# Patient Record
Sex: FEMALE | Race: BLACK OR AFRICAN AMERICAN | NOT HISPANIC OR LATINO
[De-identification: names, ages, dates, MRNs, and addresses within clinical notes are randomized per-mention and may not be internally consistent; named-entity substitution may affect disease eponyms.]

---

## 2017-01-10 ENCOUNTER — OTHER (OUTPATIENT)
Age: 22
End: 2017-01-10

## 2017-01-11 ENCOUNTER — OTHER (OUTPATIENT)
Age: 22
End: 2017-01-11

## 2017-01-23 ENCOUNTER — OTHER (OUTPATIENT)
Age: 22
End: 2017-01-23

## 2017-01-25 ENCOUNTER — OTHER (OUTPATIENT)
Age: 22
End: 2017-01-25

## 2017-01-26 ENCOUNTER — OTHER (OUTPATIENT)
Age: 22
End: 2017-01-26

## 2017-02-06 ENCOUNTER — OTHER (OUTPATIENT)
Age: 22
End: 2017-02-06

## 2017-02-10 ENCOUNTER — OTHER (OUTPATIENT)
Age: 22
End: 2017-02-10

## 2017-02-14 ENCOUNTER — OTHER (OUTPATIENT)
Age: 22
End: 2017-02-14

## 2017-02-21 ENCOUNTER — OTHER (OUTPATIENT)
Age: 22
End: 2017-02-21

## 2017-02-27 ENCOUNTER — OTHER (OUTPATIENT)
Age: 22
End: 2017-02-27

## 2017-03-08 ENCOUNTER — OTHER (OUTPATIENT)
Age: 22
End: 2017-03-08

## 2017-03-28 ENCOUNTER — OTHER (OUTPATIENT)
Age: 22
End: 2017-03-28

## 2017-04-03 ENCOUNTER — OTHER (OUTPATIENT)
Age: 22
End: 2017-04-03

## 2017-04-11 ENCOUNTER — OTHER (OUTPATIENT)
Age: 22
End: 2017-04-11

## 2017-04-27 ENCOUNTER — OTHER (OUTPATIENT)
Age: 22
End: 2017-04-27

## 2017-05-09 ENCOUNTER — OTHER (OUTPATIENT)
Age: 22
End: 2017-05-09

## 2017-05-25 ENCOUNTER — RX RENEWAL (OUTPATIENT)
Age: 22
End: 2017-05-25

## 2017-05-25 ENCOUNTER — OTHER (OUTPATIENT)
Age: 22
End: 2017-05-25

## 2017-05-30 ENCOUNTER — OTHER (OUTPATIENT)
Age: 22
End: 2017-05-30

## 2017-06-01 ENCOUNTER — RX RENEWAL (OUTPATIENT)
Age: 22
End: 2017-06-01

## 2017-06-05 ENCOUNTER — OTHER (OUTPATIENT)
Age: 22
End: 2017-06-05

## 2017-06-07 ENCOUNTER — RECORD ABSTRACTING (OUTPATIENT)
Age: 22
End: 2017-06-07

## 2017-06-12 ENCOUNTER — RX RENEWAL (OUTPATIENT)
Age: 22
End: 2017-06-12

## 2017-06-14 ENCOUNTER — OTHER (OUTPATIENT)
Age: 22
End: 2017-06-14

## 2017-06-14 ENCOUNTER — APPOINTMENT (OUTPATIENT)
Dept: PEDIATRIC ALLERGY IMMUNOLOGY | Facility: CLINIC | Age: 22
End: 2017-06-14

## 2017-06-14 VITALS
OXYGEN SATURATION: 98 % | SYSTOLIC BLOOD PRESSURE: 131 MMHG | HEART RATE: 67 BPM | DIASTOLIC BLOOD PRESSURE: 76 MMHG | HEIGHT: 64.29 IN | WEIGHT: 162.99 LBS | BODY MASS INDEX: 27.83 KG/M2

## 2017-06-14 DIAGNOSIS — L29.9 PRURITUS, UNSPECIFIED: ICD-10-CM

## 2017-06-15 LAB
25(OH)D3 SERPL-MCNC: 13.5 NG/ML
ALBUMIN SERPL ELPH-MCNC: 4.3 G/DL
ALP BLD-CCNC: 59 U/L
ALT SERPL-CCNC: 13 U/L
AMYLASE/CREAT SERPL: 207 U/L
ANION GAP SERPL CALC-SCNC: 13 MMOL/L
AST SERPL-CCNC: 20 U/L
BASOPHILS # BLD AUTO: 0 K/UL
BASOPHILS NFR BLD AUTO: 0 %
BILIRUB SERPL-MCNC: 0.4 MG/DL
BUN SERPL-MCNC: 10 MG/DL
CALCIUM SERPL-MCNC: 9.3 MG/DL
CD3 CELLS # BLD: 627 /UL
CD3 CELLS NFR BLD: 63 %
CD3+CD4+ CELLS # BLD: 9 /UL
CD3+CD4+ CELLS NFR BLD: 1 %
CD3+CD4+ CELLS/CD3+CD8+ CLL SPEC: 0.02 RATIO
CD3+CD8+ CELLS # SPEC: 567 /UL
CD3+CD8+ CELLS NFR BLD: 57 %
CHLORIDE SERPL-SCNC: 102 MMOL/L
CHOLEST SERPL-MCNC: 152 MG/DL
CHOLEST/HDLC SERPL: 2.3 RATIO
CO2 SERPL-SCNC: 24 MMOL/L
CREAT SERPL-MCNC: 0.66 MG/DL
EOSINOPHIL # BLD AUTO: 0.08 K/UL
EOSINOPHIL NFR BLD AUTO: 2.3 %
GLUCOSE SERPL-MCNC: 81 MG/DL
HAV IGG+IGM SER QL: REACTIVE
HBA1C MFR BLD HPLC: 5.4 %
HBV CORE IGG+IGM SER QL: NONREACTIVE
HBV SURFACE AB SERPL IA-ACNC: 82.6 MIU/ML
HBV SURFACE AG SER QL: NONREACTIVE
HCT VFR BLD CALC: 39.7 %
HCV AB SER QL: NONREACTIVE
HCV S/CO RATIO: 0.14 S/CO
HDLC SERPL-MCNC: 67 MG/DL
HGB BLD-MCNC: 12.8 G/DL
HIV1 RNA # SERPL NAA+PROBE: ABNORMAL
HIV1 RNA # SERPL NAA+PROBE: NORMAL
IMM GRANULOCYTES NFR BLD AUTO: 0 %
LDLC SERPL CALC-MCNC: 73 MG/DL
LPL SERPL-CCNC: 40 U/L
LYMPHOCYTES # BLD AUTO: 1.1 K/UL
LYMPHOCYTES NFR BLD AUTO: 31.3 %
MAN DIFF?: NORMAL
MCHC RBC-ENTMCNC: 25.7 PG
MCHC RBC-ENTMCNC: 32.2 GM/DL
MCV RBC AUTO: 79.7 FL
MEV IGG FLD QL IA: <5 AU/ML
MEV IGG+IGM SER-IMP: NEGATIVE
MONOCYTES # BLD AUTO: 0.27 K/UL
MONOCYTES NFR BLD AUTO: 7.7 %
MUV AB SER-ACNC: POSITIVE
MUV IGG SER QL IA: 86.7 AU/ML
NEUTROPHILS # BLD AUTO: 2.06 K/UL
NEUTROPHILS NFR BLD AUTO: 58.7 %
PLATELET # BLD AUTO: 244 K/UL
POTASSIUM SERPL-SCNC: 3.9 MMOL/L
PROT SERPL-MCNC: 7.7 G/DL
RBC # BLD: 4.98 M/UL
RBC # FLD: 15.9 %
RUBV IGG FLD-ACNC: 1.5 INDEX
RUBV IGG SER-IMP: POSITIVE
SODIUM SERPL-SCNC: 139 MMOL/L
T PALLIDUM AB SER QL IA: NEGATIVE
TRIGL SERPL-MCNC: 62 MG/DL
TSH SERPL-ACNC: 1.13 UIU/ML
VIRAL LOAD INTERP: NORMAL
VIRAL LOAD LOG: 4.76
VZV AB TITR SER: POSITIVE
VZV IGG SER IF-ACNC: 1075 INDEX
WBC # FLD AUTO: 3.51 K/UL

## 2017-06-19 LAB
ADJUSTED MITOGEN: >10 IU/ML
ADJUSTED TB AG: 0.03 IU/ML
M TB IFN-G BLD-IMP: NEGATIVE
QUANTIFERON GOLD NIL: 0.03 IU/ML

## 2017-06-20 LAB
ABACAVIR ISLT GENOTYP: NORMAL
ATAZANAVIR+RITONAVIR ISLT GENOTYP: NORMAL
DARUNAVIR+RITONAVIR ISLT GENOTYP: NORMAL
DIDANOSINE ISLT GENOTYP: NORMAL
EFAVIRENZ ISLT GENOTYP: NORMAL
EMTRICITABINE ISLT GENOTYP: NORMAL
ETRAVIRINE ISLT GENOTYP: NORMAL
FOSAMPRENAVIR+RITONAVIR ISLT GENOTYP: NORMAL
HIV NNRTI GENE MUT DET ISLT: NORMAL
HIV NRTI GENE MUT DET ISLT: NORMAL
HIV PI GENE MUT DET ISLT: NORMAL
HIV RT GENE MUT DET ISLT: NORMAL
INDINAVIR+RITONAVIR ISLT GENOTYP: NORMAL
LAMIVUDINE ISLT GENOTYP: NORMAL
LOPINAVIR+RITONAVIR ISLT GENOTYP: NORMAL
NELFINAVIR ISLT GENOTYP: NORMAL
NEVIRAPINE ISLT GENOTYP: NORMAL
RILPIVIRINE ISLT GENOTYP: NORMAL
SAQUINAVIR+RITONAVIR ISLT GENOTYP: NORMAL
STAVUDINE ISLT GENOTYP: NORMAL
TENOFOVIR ISLT GENOTYP: NORMAL
TIPRANAVIR+RITONAVIR ISLT GENOTYP: NORMAL
ZIDOVUDINE ISLT GENOTYP: NORMAL

## 2017-06-21 ENCOUNTER — OTHER (OUTPATIENT)
Age: 22
End: 2017-06-21

## 2017-06-21 LAB
DOLUTEGRAVIR RESISTANCE: NORMAL
ELVITEGRAVIR RESISTANCE: NORMAL
RALTEGRAVIR RESISTANCE: NORMAL

## 2017-06-22 ENCOUNTER — OTHER (OUTPATIENT)
Age: 22
End: 2017-06-22

## 2017-07-11 ENCOUNTER — OTHER (OUTPATIENT)
Age: 22
End: 2017-07-11

## 2017-07-12 ENCOUNTER — OTHER (OUTPATIENT)
Age: 22
End: 2017-07-12

## 2017-07-12 ENCOUNTER — APPOINTMENT (OUTPATIENT)
Dept: PEDIATRIC ALLERGY IMMUNOLOGY | Facility: CLINIC | Age: 22
End: 2017-07-12

## 2017-07-18 ENCOUNTER — OTHER (OUTPATIENT)
Age: 22
End: 2017-07-18

## 2017-07-18 ENCOUNTER — APPOINTMENT (OUTPATIENT)
Dept: DERMATOLOGY | Facility: CLINIC | Age: 22
End: 2017-07-18

## 2017-07-18 ENCOUNTER — RECORD ABSTRACTING (OUTPATIENT)
Age: 22
End: 2017-07-18

## 2017-07-18 VITALS — HEIGHT: 64.29 IN

## 2017-07-18 DIAGNOSIS — L30.9 DERMATITIS, UNSPECIFIED: ICD-10-CM

## 2017-07-18 DIAGNOSIS — L81.0 POSTINFLAMMATORY HYPERPIGMENTATION: ICD-10-CM

## 2017-07-18 DIAGNOSIS — L70.9 ACNE, UNSPECIFIED: ICD-10-CM

## 2017-07-18 DIAGNOSIS — Z56.0 UNEMPLOYMENT, UNSPECIFIED: ICD-10-CM

## 2017-07-18 SDOH — ECONOMIC STABILITY - INCOME SECURITY: UNEMPLOYMENT, UNSPECIFIED: Z56.0

## 2017-07-19 LAB
APPEARANCE: CLEAR
BILIRUBIN URINE: NEGATIVE
BLOOD URINE: NEGATIVE
C TRACH RRNA SPEC QL NAA+PROBE: NORMAL
COLOR: YELLOW
GLUCOSE QUALITATIVE U: NORMAL MG/DL
KETONES URINE: NEGATIVE
LEUKOCYTE ESTERASE URINE: NEGATIVE
N GONORRHOEA RRNA SPEC QL NAA+PROBE: NORMAL
NITRITE URINE: NEGATIVE
PH URINE: 6.5
PROTEIN URINE: NEGATIVE MG/DL
SOURCE AMPLIFICATION: NORMAL
SPECIFIC GRAVITY URINE: 1.02
UROBILINOGEN URINE: NORMAL MG/DL

## 2017-07-20 ENCOUNTER — OTHER (OUTPATIENT)
Age: 22
End: 2017-07-20

## 2017-07-20 ENCOUNTER — MESSAGE (OUTPATIENT)
Age: 22
End: 2017-07-20

## 2017-07-25 ENCOUNTER — OTHER (OUTPATIENT)
Age: 22
End: 2017-07-25

## 2017-07-26 ENCOUNTER — OTHER (OUTPATIENT)
Age: 22
End: 2017-07-26

## 2017-07-26 ENCOUNTER — APPOINTMENT (OUTPATIENT)
Dept: PEDIATRIC ALLERGY IMMUNOLOGY | Facility: CLINIC | Age: 22
End: 2017-07-26

## 2017-08-01 ENCOUNTER — APPOINTMENT (OUTPATIENT)
Dept: PEDIATRIC ALLERGY IMMUNOLOGY | Facility: CLINIC | Age: 22
End: 2017-08-01

## 2017-08-03 ENCOUNTER — APPOINTMENT (OUTPATIENT)
Dept: PEDIATRIC ALLERGY IMMUNOLOGY | Facility: CLINIC | Age: 22
End: 2017-08-03
Payer: COMMERCIAL

## 2017-08-03 ENCOUNTER — LABORATORY RESULT (OUTPATIENT)
Age: 22
End: 2017-08-03

## 2017-08-03 VITALS
WEIGHT: 162.99 LBS | HEART RATE: 99 BPM | OXYGEN SATURATION: 98 % | BODY MASS INDEX: 27.83 KG/M2 | SYSTOLIC BLOOD PRESSURE: 134 MMHG | DIASTOLIC BLOOD PRESSURE: 88 MMHG | HEIGHT: 64.29 IN

## 2017-08-03 PROCEDURE — 36415 COLL VENOUS BLD VENIPUNCTURE: CPT

## 2017-08-03 PROCEDURE — 99215 OFFICE O/P EST HI 40 MIN: CPT | Mod: 25

## 2017-08-07 LAB
C TRACH RRNA SPEC QL NAA+PROBE: NORMAL
N GONORRHOEA RRNA SPEC QL NAA+PROBE: NORMAL
SOURCE AMPLIFICATION: NORMAL
SOURCE AMPLIFICATION: NORMAL
T VAGINALIS RRNA SPEC QL NAA+PROBE: NORMAL

## 2017-08-11 ENCOUNTER — RESULT REVIEW (OUTPATIENT)
Age: 22
End: 2017-08-11

## 2017-08-11 ENCOUNTER — RESULT CHARGE (OUTPATIENT)
Age: 22
End: 2017-08-11

## 2017-08-11 LAB
CANDIDA VAG CYTO: DETECTED
CD3 CELLS # BLD: 888 /UL
CD3 CELLS NFR BLD: 73 %
CD3+CD4+ CELLS # BLD: 32 /UL
CD3+CD4+ CELLS NFR BLD: 3 %
CD3+CD4+ CELLS/CD3+CD8+ CLL SPEC: 0.04 RATIO
CD3+CD8+ CELLS # SPEC: 798 /UL
CD3+CD8+ CELLS NFR BLD: 66 %
CYTOLOGY CVX/VAG DOC THIN PREP: NORMAL
G VAGINALIS+PREV SP MTYP VAG QL MICRO: NOT DETECTED
HIV1 RNA # SERPL NAA+PROBE: ABNORMAL
HIV1 RNA # SERPL NAA+PROBE: NORMAL
T VAGINALIS VAG QL WET PREP: NOT DETECTED
VIRAL LOAD INTERP: NORMAL
VIRAL LOAD LOG: 5.04

## 2017-08-15 ENCOUNTER — RX RENEWAL (OUTPATIENT)
Age: 22
End: 2017-08-15

## 2017-08-15 ENCOUNTER — OTHER (OUTPATIENT)
Age: 22
End: 2017-08-15

## 2017-08-29 ENCOUNTER — OTHER (OUTPATIENT)
Age: 22
End: 2017-08-29

## 2017-09-07 ENCOUNTER — OTHER (OUTPATIENT)
Age: 22
End: 2017-09-07

## 2017-09-19 ENCOUNTER — OTHER (OUTPATIENT)
Age: 22
End: 2017-09-19

## 2017-09-20 ENCOUNTER — OTHER (OUTPATIENT)
Age: 22
End: 2017-09-20

## 2017-09-21 ENCOUNTER — OTHER (OUTPATIENT)
Age: 22
End: 2017-09-21

## 2017-10-02 ENCOUNTER — OTHER (OUTPATIENT)
Age: 22
End: 2017-10-02

## 2017-10-26 ENCOUNTER — OTHER (OUTPATIENT)
Age: 22
End: 2017-10-26

## 2017-11-07 ENCOUNTER — OTHER (OUTPATIENT)
Age: 22
End: 2017-11-07

## 2017-11-13 ENCOUNTER — OTHER (OUTPATIENT)
Age: 22
End: 2017-11-13

## 2017-11-14 ENCOUNTER — OTHER (OUTPATIENT)
Age: 22
End: 2017-11-14

## 2017-11-29 ENCOUNTER — OTHER (OUTPATIENT)
Age: 22
End: 2017-11-29

## 2017-12-11 ENCOUNTER — OTHER (OUTPATIENT)
Age: 22
End: 2017-12-11

## 2017-12-21 ENCOUNTER — APPOINTMENT (OUTPATIENT)
Dept: PEDIATRIC ALLERGY IMMUNOLOGY | Facility: CLINIC | Age: 22
End: 2017-12-21
Payer: COMMERCIAL

## 2017-12-21 ENCOUNTER — OTHER (OUTPATIENT)
Age: 22
End: 2017-12-21

## 2017-12-21 VITALS
OXYGEN SATURATION: 99 % | HEART RATE: 81 BPM | SYSTOLIC BLOOD PRESSURE: 118 MMHG | WEIGHT: 163 LBS | BODY MASS INDEX: 27.83 KG/M2 | HEIGHT: 64 IN | DIASTOLIC BLOOD PRESSURE: 79 MMHG

## 2017-12-21 DIAGNOSIS — Z78.9 OTHER SPECIFIED HEALTH STATUS: ICD-10-CM

## 2017-12-21 LAB — HCG UR QL: POSITIVE

## 2017-12-21 PROCEDURE — 81025 URINE PREGNANCY TEST: CPT

## 2017-12-21 PROCEDURE — 36415 COLL VENOUS BLD VENIPUNCTURE: CPT

## 2017-12-21 PROCEDURE — 99215 OFFICE O/P EST HI 40 MIN: CPT | Mod: 25

## 2017-12-22 ENCOUNTER — OTHER (OUTPATIENT)
Age: 22
End: 2017-12-22

## 2017-12-26 ENCOUNTER — OTHER (OUTPATIENT)
Age: 22
End: 2017-12-26

## 2017-12-29 LAB
ALBUMIN SERPL ELPH-MCNC: 4 G/DL
ALP BLD-CCNC: 51 U/L
ALT SERPL-CCNC: 13 U/L
ANION GAP SERPL CALC-SCNC: 12 MMOL/L
AST SERPL-CCNC: 18 U/L
BILIRUB SERPL-MCNC: 0.2 MG/DL
BUN SERPL-MCNC: 8 MG/DL
C TRACH RRNA SPEC QL NAA+PROBE: NOT DETECTED
CALCIUM SERPL-MCNC: 9 MG/DL
CHLORIDE SERPL-SCNC: 103 MMOL/L
CO2 SERPL-SCNC: 25 MMOL/L
CREAT SERPL-MCNC: 0.66 MG/DL
GLUCOSE SERPL-MCNC: 62 MG/DL
N GONORRHOEA RRNA SPEC QL NAA+PROBE: NOT DETECTED
POTASSIUM SERPL-SCNC: 4.5 MMOL/L
PROT SERPL-MCNC: 7.3 G/DL
SODIUM SERPL-SCNC: 140 MMOL/L
SOURCE AMPLIFICATION: NORMAL

## 2018-01-01 LAB
BASOPHILS # BLD AUTO: 0.01 K/UL
BASOPHILS NFR BLD AUTO: 0.2 %
CHOLEST SERPL-MCNC: 147 MG/DL
EOSINOPHIL # BLD AUTO: 0.15 K/UL
EOSINOPHIL NFR BLD AUTO: 3.3 %
HCT VFR BLD CALC: 39.5 %
HGB BLD-MCNC: 12.8 G/DL
IMM GRANULOCYTES NFR BLD AUTO: 0 %
LPL SERPL-CCNC: 44 U/L
LYMPHOCYTES # BLD AUTO: 1.06 K/UL
LYMPHOCYTES NFR BLD AUTO: 23.2 %
MAN DIFF?: NORMAL
MCHC RBC-ENTMCNC: 25.3 PG
MCHC RBC-ENTMCNC: 32.4 GM/DL
MCV RBC AUTO: 78.1 FL
MONOCYTES # BLD AUTO: 0.4 K/UL
MONOCYTES NFR BLD AUTO: 8.8 %
NEUTROPHILS # BLD AUTO: 2.94 K/UL
NEUTROPHILS NFR BLD AUTO: 64.5 %
PLATELET # BLD AUTO: 228 K/UL
RBC # BLD: 5.06 M/UL
RBC # FLD: 15.2 %
SOURCE AMPLIFICATION: NORMAL
T PALLIDUM AB SER QL IA: NEGATIVE
T VAGINALIS RRNA SPEC QL NAA+PROBE: NOT DETECTED
TRIGL SERPL-MCNC: 122 MG/DL
WBC # FLD AUTO: 4.56 K/UL

## 2018-01-03 ENCOUNTER — OTHER (OUTPATIENT)
Age: 23
End: 2018-01-03

## 2018-01-04 ENCOUNTER — OTHER (OUTPATIENT)
Age: 23
End: 2018-01-04

## 2018-01-08 ENCOUNTER — OTHER (OUTPATIENT)
Age: 23
End: 2018-01-08

## 2018-01-09 ENCOUNTER — OTHER (OUTPATIENT)
Age: 23
End: 2018-01-09

## 2018-01-23 ENCOUNTER — OTHER (OUTPATIENT)
Age: 23
End: 2018-01-23

## 2018-02-12 ENCOUNTER — OTHER (OUTPATIENT)
Age: 23
End: 2018-02-12

## 2018-02-14 ENCOUNTER — LABORATORY RESULT (OUTPATIENT)
Age: 23
End: 2018-02-14

## 2018-02-14 ENCOUNTER — APPOINTMENT (OUTPATIENT)
Dept: PEDIATRIC ALLERGY IMMUNOLOGY | Facility: CLINIC | Age: 23
End: 2018-02-14
Payer: COMMERCIAL

## 2018-02-14 VITALS
DIASTOLIC BLOOD PRESSURE: 62 MMHG | HEART RATE: 84 BPM | OXYGEN SATURATION: 97 % | WEIGHT: 162.5 LBS | BODY MASS INDEX: 27.74 KG/M2 | SYSTOLIC BLOOD PRESSURE: 93 MMHG | HEIGHT: 64 IN

## 2018-02-14 PROCEDURE — 36415 COLL VENOUS BLD VENIPUNCTURE: CPT

## 2018-02-14 PROCEDURE — 99215 OFFICE O/P EST HI 40 MIN: CPT | Mod: 25

## 2018-02-14 PROCEDURE — 81025 URINE PREGNANCY TEST: CPT

## 2018-02-14 RX ORDER — AZITHROMYCIN 250 MG/1
250 TABLET, FILM COATED ORAL
Qty: 6 | Refills: 0 | Status: DISCONTINUED | COMMUNITY
Start: 2017-09-21

## 2018-02-14 RX ORDER — MEDROXYPROGESTERONE ACETATE 150 MG/ML
150 INJECTION, SUSPENSION INTRAMUSCULAR
Qty: 0 | Refills: 0 | Status: COMPLETED | OUTPATIENT
Start: 2018-02-14

## 2018-02-14 RX ADMIN — MEDROXYPROGESTERONE ACETATE 0 MG/ML: 150 INJECTION, SUSPENSION INTRAMUSCULAR at 00:00

## 2018-02-15 ENCOUNTER — OTHER (OUTPATIENT)
Age: 23
End: 2018-02-15

## 2018-02-22 ENCOUNTER — OTHER (OUTPATIENT)
Age: 23
End: 2018-02-22

## 2018-02-22 ENCOUNTER — RX RENEWAL (OUTPATIENT)
Age: 23
End: 2018-02-22

## 2018-02-22 DIAGNOSIS — B00.9 HERPESVIRAL INFECTION, UNSPECIFIED: ICD-10-CM

## 2018-03-01 ENCOUNTER — RX RENEWAL (OUTPATIENT)
Age: 23
End: 2018-03-01

## 2018-03-05 LAB
25(OH)D3 SERPL-MCNC: 11 NG/ML
ADJUSTED MITOGEN: >10 IU/ML
ADJUSTED TB AG: 0.01 IU/ML
ALBUMIN SERPL ELPH-MCNC: 4.4 G/DL
ALP BLD-CCNC: 67 U/L
ALT SERPL-CCNC: 22 U/L
ANION GAP SERPL CALC-SCNC: 16 MMOL/L
APPEARANCE: CLEAR
AST SERPL-CCNC: 27 U/L
BASOPHILS # BLD AUTO: 0.03 K/UL
BASOPHILS NFR BLD AUTO: 0.6 %
BILIRUB SERPL-MCNC: 0.4 MG/DL
BILIRUBIN URINE: NEGATIVE
BLOOD URINE: NEGATIVE
BUN SERPL-MCNC: 9 MG/DL
C TRACH RRNA SPEC QL NAA+PROBE: NOT DETECTED
CALCIUM SERPL-MCNC: 9.7 MG/DL
CD3 CELLS # BLD: 926 /UL
CD3 CELLS NFR BLD: 70 %
CD3+CD4+ CELLS # BLD: 12 /UL
CD3+CD4+ CELLS NFR BLD: 1 %
CD3+CD4+ CELLS/CD3+CD8+ CLL SPEC: 0.01 RATIO
CD3+CD8+ CELLS # SPEC: 830 /UL
CD3+CD8+ CELLS NFR BLD: 63 %
CHLORIDE SERPL-SCNC: 104 MMOL/L
CHOLEST SERPL-MCNC: 189 MG/DL
CHOLEST/HDLC SERPL: 2.6 RATIO
CO2 SERPL-SCNC: 22 MMOL/L
COLOR: YELLOW
CREAT SERPL-MCNC: 0.83 MG/DL
EOSINOPHIL # BLD AUTO: 0.48 K/UL
EOSINOPHIL NFR BLD AUTO: 9.1 %
GLUCOSE QUALITATIVE U: NEGATIVE MG/DL
GLUCOSE SERPL-MCNC: 81 MG/DL
HAV IGG+IGM SER QL: REACTIVE
HBA1C MFR BLD HPLC: 5.3 %
HBV CORE IGG+IGM SER QL: NONREACTIVE
HBV SURFACE AB SERPL IA-ACNC: 79.8 MIU/ML
HBV SURFACE AG SER QL: NONREACTIVE
HCG SERPL-MCNC: NORMAL MIU/ML
HCG UR QL: NEGATIVE
HCT VFR BLD CALC: 41 %
HCV AB SER QL: NONREACTIVE
HCV S/CO RATIO: 0.12 S/CO
HDLC SERPL-MCNC: 74 MG/DL
HGB BLD-MCNC: 13.1 G/DL
HIV1 RNA # SERPL NAA+PROBE: ABNORMAL
HIV1 RNA # SERPL NAA+PROBE: ABNORMAL
HIV1 RNA # SERPL NAA+PROBE: ABNORMAL COPIES/ML
HIV1 RNA # SERPL NAA+PROBE: NORMAL
IMM GRANULOCYTES NFR BLD AUTO: 0 %
KETONES URINE: NEGATIVE
LDLC SERPL CALC-MCNC: 99 MG/DL
LEUKOCYTE ESTERASE URINE: NEGATIVE
LPL SERPL-CCNC: 36 U/L
LYMPHOCYTES # BLD AUTO: 1.89 K/UL
LYMPHOCYTES NFR BLD AUTO: 35.7 %
M TB IFN-G BLD-IMP: NEGATIVE
MAN DIFF?: NORMAL
MCHC RBC-ENTMCNC: 26.5 PG
MCHC RBC-ENTMCNC: 32 GM/DL
MCV RBC AUTO: 83 FL
MONOCYTES # BLD AUTO: 0.49 K/UL
MONOCYTES NFR BLD AUTO: 9.2 %
N GONORRHOEA RRNA SPEC QL NAA+PROBE: NOT DETECTED
NEUTROPHILS # BLD AUTO: 2.41 K/UL
NEUTROPHILS NFR BLD AUTO: 45.4 %
NITRITE URINE: NEGATIVE
PH URINE: 5.5
PLATELET # BLD AUTO: 245 K/UL
POTASSIUM SERPL-SCNC: 4.5 MMOL/L
PROT SERPL-MCNC: 8 G/DL
PROTEIN URINE: NEGATIVE MG/DL
QUANTIFERON GOLD NIL: 0.05 IU/ML
RBC # BLD: 4.94 M/UL
RBC # FLD: 16.4 %
SODIUM SERPL-SCNC: 142 MMOL/L
SOURCE AMPLIFICATION: NORMAL
SOURCE AMPLIFICATION: NORMAL
SPECIFIC GRAVITY URINE: 1.02
T PALLIDUM AB SER QL IA: NEGATIVE
T VAGINALIS RRNA SPEC QL NAA+PROBE: NOT DETECTED
TRIGL SERPL-MCNC: 80 MG/DL
UROBILINOGEN URINE: NEGATIVE MG/DL
VIRAL LOAD INTERP: NORMAL
VIRAL LOAD INTERP: NORMAL
VIRAL LOAD LOG: 4.69
VIRAL LOAD LOG: ABNORMAL LG COP/ML
WBC # FLD AUTO: 5.3 K/UL

## 2018-03-14 ENCOUNTER — RESULT CHARGE (OUTPATIENT)
Age: 23
End: 2018-03-14

## 2018-03-16 ENCOUNTER — APPOINTMENT (OUTPATIENT)
Dept: PEDIATRIC ALLERGY IMMUNOLOGY | Facility: CLINIC | Age: 23
End: 2018-03-16

## 2018-03-16 ENCOUNTER — APPOINTMENT (OUTPATIENT)
Dept: OBGYN | Facility: CLINIC | Age: 23
End: 2018-03-16

## 2018-06-07 ENCOUNTER — OTHER (OUTPATIENT)
Age: 23
End: 2018-06-07

## 2018-06-07 LAB
25(OH)D3 SERPL-MCNC: 15.3 NG/ML
CD3 CELLS # BLD: 1247 /UL
CD3 CELLS NFR BLD: 65 %
CD3+CD4+ CELLS # BLD: 65 /UL
CD3+CD4+ CELLS NFR BLD: 3 %
CD3+CD4+ CELLS/CD3+CD8+ CLL SPEC: 0.06 RATIO
CD3+CD8+ CELLS # SPEC: 1105 /UL
CD3+CD8+ CELLS NFR BLD: 58 %

## 2018-06-07 RX ORDER — VALACYCLOVIR 500 MG/1
500 TABLET, FILM COATED ORAL TWICE DAILY
Qty: 60 | Refills: 1 | Status: DISCONTINUED | COMMUNITY
Start: 2018-03-14 | End: 2018-06-07

## 2018-06-11 ENCOUNTER — APPOINTMENT (OUTPATIENT)
Dept: PEDIATRIC ALLERGY IMMUNOLOGY | Facility: CLINIC | Age: 23
End: 2018-06-11
Payer: COMMERCIAL

## 2018-06-11 ENCOUNTER — OTHER (OUTPATIENT)
Age: 23
End: 2018-06-11

## 2018-06-11 VITALS
SYSTOLIC BLOOD PRESSURE: 127 MMHG | BODY MASS INDEX: 27.31 KG/M2 | WEIGHT: 159.99 LBS | HEIGHT: 64.17 IN | DIASTOLIC BLOOD PRESSURE: 79 MMHG | OXYGEN SATURATION: 98 % | HEART RATE: 93 BPM

## 2018-06-11 DIAGNOSIS — R87.610 ATYPICAL SQUAMOUS CELLS OF UNDETERMINED SIGNIFICANCE ON CYTOLOGIC SMEAR OF CERVIX (ASC-US): ICD-10-CM

## 2018-06-11 PROCEDURE — 36415 COLL VENOUS BLD VENIPUNCTURE: CPT

## 2018-06-11 PROCEDURE — 99215 OFFICE O/P EST HI 40 MIN: CPT | Mod: 25

## 2018-06-11 PROCEDURE — G0101: CPT

## 2018-06-11 RX ORDER — FLUCONAZOLE 150 MG/1
150 TABLET ORAL
Qty: 1 | Refills: 0 | Status: DISCONTINUED | COMMUNITY
Start: 2017-08-11 | End: 2018-06-11

## 2018-06-11 RX ORDER — VALACYCLOVIR 1 G/1
1 TABLET, FILM COATED ORAL
Qty: 10 | Refills: 0 | Status: DISCONTINUED | COMMUNITY
Start: 2018-01-23 | End: 2018-06-11

## 2018-06-11 RX ADMIN — MEDROXYPROGESTERONE ACETATE 0 MG/ML: 150 INJECTION, SUSPENSION INTRAMUSCULAR at 00:00

## 2018-06-12 LAB
ALBUMIN SERPL ELPH-MCNC: 4.3 G/DL
ALP BLD-CCNC: 61 U/L
ALT SERPL-CCNC: 20 U/L
ANION GAP SERPL CALC-SCNC: 13 MMOL/L
AST SERPL-CCNC: 24 U/L
BASOPHILS # BLD AUTO: 0 K/UL
BASOPHILS NFR BLD AUTO: 0 %
BILIRUB SERPL-MCNC: 0.3 MG/DL
BUN SERPL-MCNC: 9 MG/DL
C TRACH RRNA SPEC QL NAA+PROBE: NOT DETECTED
CALCIUM SERPL-MCNC: 9.4 MG/DL
CD3 CELLS # BLD: 1576 /UL
CD3 CELLS NFR BLD: 79 %
CD3+CD4+ CELLS # BLD: 44 /UL
CD3+CD4+ CELLS NFR BLD: 2 %
CD3+CD4+ CELLS/CD3+CD8+ CLL SPEC: 0.03 RATIO
CD3+CD8+ CELLS # SPEC: 1418 /UL
CD3+CD8+ CELLS NFR BLD: 71 %
CHLORIDE SERPL-SCNC: 101 MMOL/L
CHOLEST SERPL-MCNC: 157 MG/DL
CO2 SERPL-SCNC: 25 MMOL/L
CREAT SERPL-MCNC: 0.82 MG/DL
EOSINOPHIL # BLD AUTO: 0.18 K/UL
EOSINOPHIL NFR BLD AUTO: 4 %
GLUCOSE SERPL-MCNC: 74 MG/DL
HCT VFR BLD CALC: 42.7 %
HGB BLD-MCNC: 13.2 G/DL
HIV1 RNA # SERPL NAA+PROBE: 35
HIV1 RNA # SERPL NAA+PROBE: ABNORMAL
IMM GRANULOCYTES NFR BLD AUTO: 0 %
LPL SERPL-CCNC: 46 U/L
LYMPHOCYTES # BLD AUTO: 2.07 K/UL
LYMPHOCYTES NFR BLD AUTO: 46.4 %
MAN DIFF?: NORMAL
MCHC RBC-ENTMCNC: 25.2 PG
MCHC RBC-ENTMCNC: 30.9 GM/DL
MCV RBC AUTO: 81.6 FL
MONOCYTES # BLD AUTO: 0.35 K/UL
MONOCYTES NFR BLD AUTO: 7.8 %
N GONORRHOEA RRNA SPEC QL NAA+PROBE: NOT DETECTED
NEUTROPHILS # BLD AUTO: 1.86 K/UL
NEUTROPHILS NFR BLD AUTO: 41.8 %
PLATELET # BLD AUTO: 262 K/UL
POTASSIUM SERPL-SCNC: 4.5 MMOL/L
PROT SERPL-MCNC: 7.9 G/DL
RBC # BLD: 5.23 M/UL
RBC # FLD: 15.3 %
SODIUM SERPL-SCNC: 139 MMOL/L
SOURCE AMPLIFICATION: NORMAL
TRIGL SERPL-MCNC: 104 MG/DL
VIRAL LOAD INTERP: NORMAL
VIRAL LOAD LOG: 1.54
WBC # FLD AUTO: 4.46 K/UL

## 2018-06-13 ENCOUNTER — OTHER (OUTPATIENT)
Age: 23
End: 2018-06-13

## 2018-06-13 LAB
MEV IGG FLD QL IA: <5 AU/ML
MEV IGG+IGM SER-IMP: NEGATIVE
MUV AB SER-ACNC: POSITIVE
MUV IGG SER QL IA: 65.8 AU/ML
RUBV IGG FLD-ACNC: 1.2 INDEX
RUBV IGG SER-IMP: POSITIVE
SOURCE AMPLIFICATION: NORMAL
T PALLIDUM AB SER QL IA: NEGATIVE
T VAGINALIS RRNA SPEC QL NAA+PROBE: NOT DETECTED
VZV AB TITR SER: POSITIVE
VZV IGG SER IF-ACNC: 856.6 INDEX

## 2018-06-18 ENCOUNTER — OTHER (OUTPATIENT)
Age: 23
End: 2018-06-18

## 2018-06-18 LAB — CYTOLOGY CVX/VAG DOC THIN PREP: NORMAL

## 2018-06-20 ENCOUNTER — OTHER (OUTPATIENT)
Age: 23
End: 2018-06-20

## 2018-06-21 ENCOUNTER — OTHER (OUTPATIENT)
Age: 23
End: 2018-06-21

## 2018-06-25 ENCOUNTER — OTHER (OUTPATIENT)
Age: 23
End: 2018-06-25

## 2018-07-11 ENCOUNTER — OTHER (OUTPATIENT)
Age: 23
End: 2018-07-11

## 2018-07-12 ENCOUNTER — OTHER (OUTPATIENT)
Age: 23
End: 2018-07-12

## 2018-07-31 ENCOUNTER — OTHER (OUTPATIENT)
Age: 23
End: 2018-07-31

## 2018-08-15 ENCOUNTER — OTHER (OUTPATIENT)
Age: 23
End: 2018-08-15

## 2018-08-22 ENCOUNTER — OTHER (OUTPATIENT)
Age: 23
End: 2018-08-22

## 2018-08-28 ENCOUNTER — OTHER (OUTPATIENT)
Age: 23
End: 2018-08-28

## 2018-09-26 ENCOUNTER — OTHER (OUTPATIENT)
Age: 23
End: 2018-09-26

## 2018-10-01 ENCOUNTER — MED ADMIN CHARGE (OUTPATIENT)
Age: 23
End: 2018-10-01

## 2018-10-01 ENCOUNTER — APPOINTMENT (OUTPATIENT)
Dept: PEDIATRIC ALLERGY IMMUNOLOGY | Facility: CLINIC | Age: 23
End: 2018-10-01
Payer: COMMERCIAL

## 2018-10-01 ENCOUNTER — LABORATORY RESULT (OUTPATIENT)
Age: 23
End: 2018-10-01

## 2018-10-01 ENCOUNTER — OTHER (OUTPATIENT)
Age: 23
End: 2018-10-01

## 2018-10-01 VITALS
DIASTOLIC BLOOD PRESSURE: 84 MMHG | HEART RATE: 93 BPM | SYSTOLIC BLOOD PRESSURE: 126 MMHG | BODY MASS INDEX: 28.35 KG/M2 | OXYGEN SATURATION: 93 % | WEIGHT: 160 LBS | HEIGHT: 63 IN

## 2018-10-01 DIAGNOSIS — R87.612 LOW GRADE SQUAMOUS INTRAEPITHELIAL LESION ON CYTOLOGIC SMEAR OF CERVIX (LGSIL): ICD-10-CM

## 2018-10-01 DIAGNOSIS — Z23 ENCOUNTER FOR IMMUNIZATION: ICD-10-CM

## 2018-10-01 DIAGNOSIS — Z09 ENCOUNTER FOR FOLLOW-UP EXAMINATION AFTER COMPLETED TREATMENT FOR CONDITIONS OTHER THAN MALIGNANT NEOPLASM: ICD-10-CM

## 2018-10-01 LAB — HCG UR QL: NEGATIVE

## 2018-10-01 PROCEDURE — 90471 IMMUNIZATION ADMIN: CPT

## 2018-10-01 PROCEDURE — 90686 IIV4 VACC NO PRSV 0.5 ML IM: CPT

## 2018-10-01 PROCEDURE — 36415 COLL VENOUS BLD VENIPUNCTURE: CPT

## 2018-10-01 PROCEDURE — 81025 URINE PREGNANCY TEST: CPT

## 2018-10-01 PROCEDURE — 99215 OFFICE O/P EST HI 40 MIN: CPT | Mod: 25

## 2018-10-01 RX ORDER — MEDROXYPROGESTERONE ACETATE 150 MG/ML
150 INJECTION, SUSPENSION INTRAMUSCULAR
Qty: 0 | Refills: 0 | Status: COMPLETED | OUTPATIENT
Start: 2018-10-01

## 2018-10-01 RX ADMIN — MEDROXYPROGESTERONE ACETATE 1 MG/ML: 150 INJECTION, SUSPENSION, EXTENDED RELEASE INTRAMUSCULAR at 00:00

## 2018-10-02 ENCOUNTER — OTHER (OUTPATIENT)
Age: 23
End: 2018-10-02

## 2018-10-02 LAB
ALBUMIN SERPL ELPH-MCNC: 4.5 G/DL
ALP BLD-CCNC: 74 U/L
ALT SERPL-CCNC: 21 U/L
ANION GAP SERPL CALC-SCNC: 11 MMOL/L
AST SERPL-CCNC: 28 U/L
BASOPHILS # BLD AUTO: 0.02 K/UL
BASOPHILS NFR BLD AUTO: 0.6 %
BILIRUB SERPL-MCNC: 0.3 MG/DL
BUN SERPL-MCNC: 10 MG/DL
C TRACH RRNA SPEC QL NAA+PROBE: NOT DETECTED
CALCIUM SERPL-MCNC: 9.9 MG/DL
CD3 CELLS # BLD: 1139 /UL
CD3 CELLS NFR BLD: 80 %
CD3+CD4+ CELLS # BLD: 31 /UL
CD3+CD4+ CELLS NFR BLD: 2 %
CD3+CD4+ CELLS/CD3+CD8+ CLL SPEC: 0.03 RATIO
CD3+CD8+ CELLS # SPEC: 959 /UL
CD3+CD8+ CELLS NFR BLD: 67 %
CHLORIDE SERPL-SCNC: 102 MMOL/L
CHOLEST SERPL-MCNC: 162 MG/DL
CO2 SERPL-SCNC: 26 MMOL/L
CREAT SERPL-MCNC: 0.84 MG/DL
EOSINOPHIL # BLD AUTO: 0.12 K/UL
EOSINOPHIL NFR BLD AUTO: 3.3 %
GLUCOSE SERPL-MCNC: 77 MG/DL
HCT VFR BLD CALC: 43.6 %
HGB BLD-MCNC: 13.3 G/DL
IMM GRANULOCYTES NFR BLD AUTO: 0 %
LPL SERPL-CCNC: 32 U/L
LYMPHOCYTES # BLD AUTO: 1.61 K/UL
LYMPHOCYTES NFR BLD AUTO: 44.4 %
MAN DIFF?: NORMAL
MCHC RBC-ENTMCNC: 24.8 PG
MCHC RBC-ENTMCNC: 30.5 GM/DL
MCV RBC AUTO: 81.3 FL
MONOCYTES # BLD AUTO: 0.46 K/UL
MONOCYTES NFR BLD AUTO: 12.7 %
N GONORRHOEA RRNA SPEC QL NAA+PROBE: NOT DETECTED
NEUTROPHILS # BLD AUTO: 1.42 K/UL
NEUTROPHILS NFR BLD AUTO: 39 %
PLATELET # BLD AUTO: 232 K/UL
POTASSIUM SERPL-SCNC: 4.5 MMOL/L
PROT SERPL-MCNC: 8.6 G/DL
RBC # BLD: 5.36 M/UL
RBC # FLD: 15.8 %
SODIUM SERPL-SCNC: 139 MMOL/L
SOURCE AMPLIFICATION: NORMAL
SOURCE AMPLIFICATION: NORMAL
T PALLIDUM AB SER QL IA: NEGATIVE
T VAGINALIS RRNA SPEC QL NAA+PROBE: NOT DETECTED
TRIGL SERPL-MCNC: 89 MG/DL
WBC # FLD AUTO: 3.63 K/UL

## 2018-10-03 ENCOUNTER — OTHER (OUTPATIENT)
Age: 23
End: 2018-10-03

## 2018-10-03 LAB
HIV1 RNA # SERPL NAA+PROBE: ABNORMAL
HIV1 RNA # SERPL NAA+PROBE: NORMAL
VIRAL LOAD INTERP: NORMAL
VIRAL LOAD LOG: 4.81

## 2018-11-13 ENCOUNTER — OTHER (OUTPATIENT)
Age: 23
End: 2018-11-13

## 2018-11-27 ENCOUNTER — OTHER (OUTPATIENT)
Age: 23
End: 2018-11-27

## 2018-12-06 ENCOUNTER — OTHER (OUTPATIENT)
Age: 23
End: 2018-12-06

## 2018-12-07 ENCOUNTER — MED ADMIN CHARGE (OUTPATIENT)
Age: 23
End: 2018-12-07

## 2018-12-07 ENCOUNTER — APPOINTMENT (OUTPATIENT)
Dept: PEDIATRIC ALLERGY IMMUNOLOGY | Facility: CLINIC | Age: 23
End: 2018-12-07
Payer: COMMERCIAL

## 2018-12-07 VITALS — DIASTOLIC BLOOD PRESSURE: 89 MMHG | WEIGHT: 157.19 LBS | SYSTOLIC BLOOD PRESSURE: 126 MMHG | HEART RATE: 74 BPM

## 2018-12-07 DIAGNOSIS — N76.0 ACUTE VAGINITIS: ICD-10-CM

## 2018-12-07 DIAGNOSIS — B96.89 ACUTE VAGINITIS: ICD-10-CM

## 2018-12-07 DIAGNOSIS — Z86.19 PERSONAL HISTORY OF OTHER INFECTIOUS AND PARASITIC DISEASES: ICD-10-CM

## 2018-12-07 PROCEDURE — 99215 OFFICE O/P EST HI 40 MIN: CPT | Mod: 25

## 2018-12-07 PROCEDURE — 36415 COLL VENOUS BLD VENIPUNCTURE: CPT

## 2018-12-07 RX ORDER — MEDROXYPROGESTERONE ACETATE 150 MG/ML
150 INJECTION, SUSPENSION INTRAMUSCULAR
Qty: 0 | Refills: 0 | Status: COMPLETED | OUTPATIENT
Start: 2018-12-07

## 2018-12-07 RX ORDER — TRIAMCINOLONE ACETONIDE 1 MG/G
0.1 CREAM TOPICAL
Qty: 1 | Refills: 0 | Status: DISCONTINUED | COMMUNITY
Start: 2017-07-18 | End: 2018-12-07

## 2018-12-07 RX ORDER — TRETINOIN 0.05 G/100G
0.05 GEL TOPICAL
Qty: 45 | Refills: 0 | Status: DISCONTINUED | COMMUNITY
Start: 2018-05-31 | End: 2018-12-07

## 2018-12-07 RX ORDER — HYDROCORTISONE 25 MG/G
2.5 CREAM TOPICAL
Qty: 1 | Refills: 1 | Status: DISCONTINUED | COMMUNITY
Start: 2017-07-18 | End: 2018-12-07

## 2018-12-07 RX ORDER — SULFACETAMIDE SODIUM, SULFUR 100; 50 MG/G; MG/G
10-5 LIQUID TOPICAL
Qty: 340 | Refills: 0 | Status: DISCONTINUED | COMMUNITY
Start: 2018-05-31 | End: 2018-12-07

## 2018-12-07 RX ORDER — CLINDAMYCIN PHOSPHATE 10 MG/ML
1 LOTION TOPICAL
Qty: 60 | Refills: 0 | Status: DISCONTINUED | COMMUNITY
Start: 2018-05-31 | End: 2018-12-07

## 2018-12-07 RX ADMIN — MEDROXYPROGESTERONE ACETATE 0 MG/ML: 150 INJECTION, SUSPENSION INTRAMUSCULAR at 00:00

## 2018-12-10 ENCOUNTER — OTHER (OUTPATIENT)
Age: 23
End: 2018-12-10

## 2018-12-10 ENCOUNTER — MESSAGE (OUTPATIENT)
Age: 23
End: 2018-12-10

## 2018-12-10 LAB
BASOPHILS # BLD AUTO: 0.01 K/UL
BASOPHILS NFR BLD AUTO: 0.3 %
CANDIDA VAG CYTO: NOT DETECTED
CD3 CELLS # BLD: 1294 /UL
CD3 CELLS NFR BLD: 75 %
CD3+CD4+ CELLS # BLD: 50 /UL
CD3+CD4+ CELLS NFR BLD: 3 %
CD3+CD4+ CELLS/CD3+CD8+ CLL SPEC: 0.05 RATIO
CD3+CD8+ CELLS # SPEC: 1020 /UL
CD3+CD8+ CELLS NFR BLD: 59 %
EOSINOPHIL # BLD AUTO: 0.06 K/UL
EOSINOPHIL NFR BLD AUTO: 1.5 %
G VAGINALIS+PREV SP MTYP VAG QL MICRO: DETECTED
HCT VFR BLD CALC: 38.6 %
HGB BLD-MCNC: 12.3 G/DL
HIV1 RNA # SERPL NAA+PROBE: ABNORMAL
HIV1 RNA # SERPL NAA+PROBE: NORMAL
IMM GRANULOCYTES NFR BLD AUTO: 0 %
LYMPHOCYTES # BLD AUTO: 1.86 K/UL
LYMPHOCYTES NFR BLD AUTO: 47.3 %
MAN DIFF?: NORMAL
MCHC RBC-ENTMCNC: 25.1 PG
MCHC RBC-ENTMCNC: 31.9 GM/DL
MCV RBC AUTO: 78.8 FL
MONOCYTES # BLD AUTO: 0.42 K/UL
MONOCYTES NFR BLD AUTO: 10.7 %
NEUTROPHILS # BLD AUTO: 1.58 K/UL
NEUTROPHILS NFR BLD AUTO: 40.2 %
PLATELET # BLD AUTO: 222 K/UL
RBC # BLD: 4.9 M/UL
RBC # FLD: 16.3 %
T VAGINALIS VAG QL WET PREP: NOT DETECTED
VIRAL LOAD INTERP: NORMAL
VIRAL LOAD LOG: 5.71
WBC # FLD AUTO: 3.93 K/UL

## 2018-12-13 ENCOUNTER — OTHER (OUTPATIENT)
Age: 23
End: 2018-12-13

## 2018-12-19 ENCOUNTER — OTHER (OUTPATIENT)
Age: 23
End: 2018-12-19

## 2018-12-21 ENCOUNTER — OTHER (OUTPATIENT)
Age: 23
End: 2018-12-21

## 2019-01-03 ENCOUNTER — APPOINTMENT (OUTPATIENT)
Dept: PEDIATRIC ALLERGY IMMUNOLOGY | Facility: CLINIC | Age: 24
End: 2019-01-03
Payer: COMMERCIAL

## 2019-01-03 VITALS — SYSTOLIC BLOOD PRESSURE: 127 MMHG | HEART RATE: 80 BPM | DIASTOLIC BLOOD PRESSURE: 86 MMHG | WEIGHT: 153.6 LBS

## 2019-01-03 PROCEDURE — 99215 OFFICE O/P EST HI 40 MIN: CPT

## 2019-01-03 RX ORDER — CEPHALEXIN 500 MG/1
500 CAPSULE ORAL
Qty: 21 | Refills: 0 | Status: DISCONTINUED | COMMUNITY
Start: 2018-08-08

## 2019-01-03 RX ORDER — ELVITEGRAVIR, COBICISTAT, EMTRICITABINE, AND TENOFOVIR ALAFENAMIDE 150; 150; 200; 10 MG/1; MG/1; MG/1; MG/1
150-150-200-10 TABLET ORAL
Qty: 1 | Refills: 3 | Status: DISCONTINUED | COMMUNITY
Start: 2018-10-01 | End: 2019-01-03

## 2019-01-03 RX ORDER — PREDNISONE 10 MG/1
10 TABLET ORAL
Qty: 21 | Refills: 0 | Status: DISCONTINUED | COMMUNITY
Start: 2018-08-08

## 2019-01-03 NOTE — SOCIAL HISTORY
[Sexually Active] : The patient is sexually active [Contraception] : uses contraception [Medroxyprogesterone Injection] : uses medroxyprogesterone acetate injection [Always] : always [Vaginal] : vaginal [Oral-Receptive (pt. mouth)] : oral-receptive (pt. mouth) [Male ___] : [unfilled] male [Date of most recent sexual encounter ___] : ~His/Her~ most recent sexual encounter was [unfilled] [Partnership for Health – Safe Sex Evidence Based Intervention] : The Partnership for Health Safe Sex Evidence Based Intervention was applied [Loss Frame Message] :  and a loss frame message was provided. [Monogamous] : is not monogamous [Partner Aware?] : Partner Aware? 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

## 2019-01-03 NOTE — DISCUSSION/SUMMARY
[VL Unstable, meds changed] : VL Unstable, meds changed [Adherence Intervention in Place] : adherence intervention in place [Resistance Testing Ordered] : resisitance testing ordered [] : needed for PCP [15 min] : 15 min [HIV Education] : HIV Education [Transmission] : transmission [ARV Therapy] : ARV therapy [Universal Precautions] : universal precautions [Treatment Education] : treatment education [Drug Interactions] : drug interactions [Immune System] : immune system [Treatment Adherence] : treatment adherence [Anticipatory Guidance] : anticipatory guidance [Prognosis] : prognosis [Pre-conception Counseling] : pre-conception counseling [Sexuality/Safer Sex] : sexuality/safer sex [Family Planning] : family planning [Partner Notification Info/Discussion] : partner notification info/discussion [HIV info] : HIV info [Condoms] : condoms [Risk Reduction] : risk reduction [PrEP/PEP] : PrEP/PEP [The Topics Listed Above] : the topics listed above [The patient was able to ask questions and explain these concepts in his/her own words] : the patient was able to ask questions and explain these concepts in his/her own words

## 2019-01-03 NOTE — PHYSICAL EXAM
[Alert] : alert [Well Nourished] : well nourished [Healthy Appearance] : healthy appearance [No Acute Distress] : no acute distress [Well Developed] : well developed [Normal Pupil & Iris Size/Symmetry] : normal pupil and iris size and symmetry [No Discharge] : no discharge [No Photophobia] : no photophobia [Sclera Not Icteric] : sclera not icteric [Normal TMs] : both tympanic membranes were normal [Normal Nasal Mucosa] : the nasal mucosa was normal [Normal Lips/Tongue] : the lips and tongue were normal [Normal Outer Ear/Nose] : the ears and nose were normal in appearance [Normal Tonsils] : normal tonsils [No Thrush] : no thrush [Normal Dentition] : normal dentition [No Oral Lesions or Ulcers] : no oral lesions or ulcers [Supple] : the neck was supple [Normal Rate and Effort] : normal respiratory rhythm and effort [Normal Palpation] : palpation of the chest revealed no abnormalities [No Crackles] : no crackles [No Retractions] : no retractions [Bilateral Audible Breath Sounds] : bilateral audible breath sounds [Normal Rate] : heart rate was normal  [Normal S1, S2] : normal S1 and S2 [No murmur] : no murmur [Regular Rhythm] : with a regular rhythm [Soft] : abdomen soft [Not Tender] : non-tender [Not Distended] : not distended [No HSM] : no hepato-splenomegaly [Normal Cervical Lymph Nodes] : cervical [Normal Axillary Lumph Nodes] : axillary [Skin Intact] : skin intact  [No Rash] : no rash [No Skin Lesions] : no skin lesions [No Joint Swelling or Erythema] : no joint swelling or erythema [No clubbing] : no clubbing [No Edema] : no edema [No Cyanosis] : no cyanosis [Normal Mood] : mood was normal [Normal Affect] : affect was normal [Alert, Awake, Oriented as Age-Appropriate] : alert, awake, oriented as age appropriate

## 2019-01-03 NOTE — HISTORY OF PRESENT ILLNESS
[Adherence] : Adherence [Poor] : Poor [Percent Adherence: _____ %] : [unfilled]% adherence [Yes (action plan needed)] : Yes (action plan needed) [No] : No [Approximately ___ (Month)] : the LMP was approximately [unfilled] month(s) ago [FreeTextEntry1] : SHABNAM JAMES is a 23 year old, female seen on 01/03/2019 for  cARV regimen change. \par \par Stopped taking her meds about 2 weeks ago completely  when she was notified by our case management team of resistance to her meds. \par \par Patient has a long hx of poor adherence of HIV meds, was updated from [facility] to [facility] in 10//2018.\par Most common reason she forgets medications is she forgets before leaving for work to take it. \par Going to take divide her medication in two pill bottles. One to leave at home and one to bring with her in her bag.  \par \par States she is now taking Accutane twice daily but says she forgets the second dose about half of the week.  Will also try to improve adherence with this. \par Saw GYN for repeat pap this week and was still LSIL and repeat pap in 4/2019. \par Derm appointment this Tuesday and will continue Accutane for 3 more months. 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 Right now working at [employer] until nursing school starts again in 2 weeks. \par \par single and talking to a cis male, not serious. Focusing on school. No sexual activity since last visit.  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

## 2019-01-11 LAB
ABACAVIR ISLT GENOTYP: NORMAL
ATAZANAVIR+RITONAVIR ISLT GENOTYP: NORMAL
DARUNAVIR+RITONAVIR ISLT GENOTYP: NORMAL
DIDANOSINE ISLT GENOTYP: NORMAL
DOLUTEGRAVIR RESISTANCE: NORMAL
EFAVIRENZ ISLT GENOTYP: NORMAL
ELVITEGRAVIR RESISTANCE: NORMAL
EMTRICITABINE ISLT GENOTYP: NORMAL
ETRAVIRINE ISLT GENOTYP: NORMAL
FOSAMPRENAVIR+RITONAVIR ISLT GENOTYP: NORMAL
HIV NNRTI GENE MUT DET ISLT: NORMAL
HIV NRTI GENE MUT DET ISLT: NORMAL
HIV PI GENE MUT DET ISLT: NORMAL
HIV RT GENE MUT DET ISLT: NORMAL
HIV1 RNA # SERPL NAA+PROBE: ABNORMAL
HIV1 RNA # SERPL NAA+PROBE: NORMAL
INDINAVIR+RITONAVIR ISLT GENOTYP: NORMAL
LAMIVUDINE ISLT GENOTYP: NORMAL
LOPINAVIR+RITONAVIR ISLT GENOTYP: NORMAL
NELFINAVIR ISLT GENOTYP: NORMAL
NEVIRAPINE ISLT GENOTYP: NORMAL
RALTEGRAVIR RESISTANCE: NORMAL
RILPIVIRINE ISLT GENOTYP: NORMAL
SAQUINAVIR+RITONAVIR ISLT GENOTYP: NORMAL
STAVUDINE ISLT GENOTYP: NORMAL
TENOFOVIR ISLT GENOTYP: NORMAL
TIPRANAVIR+RITONAVIR ISLT GENOTYP: NORMAL
VIRAL LOAD INTERP: NORMAL
VIRAL LOAD LOG: 5.69
ZIDOVUDINE ISLT GENOTYP: NORMAL

## 2019-02-04 ENCOUNTER — APPOINTMENT (OUTPATIENT)
Dept: PEDIATRIC ALLERGY IMMUNOLOGY | Facility: CLINIC | Age: 24
End: 2019-02-04
Payer: COMMERCIAL

## 2019-02-04 ENCOUNTER — LABORATORY RESULT (OUTPATIENT)
Age: 24
End: 2019-02-04

## 2019-02-04 VITALS — DIASTOLIC BLOOD PRESSURE: 81 MMHG | OXYGEN SATURATION: 98 % | HEART RATE: 87 BPM | SYSTOLIC BLOOD PRESSURE: 126 MMHG

## 2019-02-04 DIAGNOSIS — Z12.4 ENCOUNTER FOR SCREENING FOR MALIGNANT NEOPLASM OF CERVIX: ICD-10-CM

## 2019-02-04 DIAGNOSIS — D64.9 ANEMIA, UNSPECIFIED: ICD-10-CM

## 2019-02-04 DIAGNOSIS — Z11.3 ENCOUNTER FOR SCREENING FOR INFECTIONS WITH A PREDOMINANTLY SEXUAL MODE OF TRANSMISSION: ICD-10-CM

## 2019-02-04 PROCEDURE — 36415 COLL VENOUS BLD VENIPUNCTURE: CPT

## 2019-02-04 PROCEDURE — 99215 OFFICE O/P EST HI 40 MIN: CPT | Mod: 25

## 2019-02-04 NOTE — SOCIAL HISTORY
[Sexually Active] : The patient is sexually active [Contraception] : uses contraception [Medroxyprogesterone Injection] : uses medroxyprogesterone acetate injection [Always] : always [Vaginal] : vaginal [Oral-Receptive (pt. mouth)] : oral-receptive (pt. mouth) [Male ___] : [unfilled] male [Partnership for Health – Safe Sex Evidence Based Intervention] : The Partnership for Health Safe Sex Evidence Based Intervention was applied [Loss Frame Message] :  and a loss frame message was provided. [Date of most recent sexual encounter ___] : ~His/Her~ most recent sexual encounter was [unfilled] [Monogamous] : is not monogamous [Partner Aware?] : Partner Aware? 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

## 2019-02-04 NOTE — HISTORY OF PRESENT ILLNESS
[Annual] : Annual [No] : No [Excellent] : Excellent [Percent Adherence: _____ %] : [unfilled]% adherence [Definite:  ___ (Date)] : the last menstrual period was [unfilled] [FreeTextEntry1] : SHABNAM JAMES is a 23 year old, female seen on 02/04/2019 for  HIV Annual Exam. \par \par Taking cARV regimen at night after school. Always has pills on her. In the past month and half missed only two full days of meds. 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 Graduating in May. Interested in a residency or fellowship.  Promoted at [employer] recently. \par \par Following with DERM on Accutane twice daily. \par Taking Valtrex twice daily.  [FreeTextEntry7] : 2019  [FreeTextEntry6] : local

## 2019-02-04 NOTE — DISCUSSION/SUMMARY
[Unable to Determine (labs pdg)] : unable to determine (labs pdg) [] : needed for PCP [15 min] : 15 min [HIV Education] : HIV Education [Transmission] : transmission [ARV Therapy] : ARV therapy [Universal Precautions] : universal precautions [Treatment Education] : treatment education [Drug Interactions] : drug interactions [Immune System] : immune system [Treatment Adherence] : treatment adherence [Anticipatory Guidance] : anticipatory guidance [Prognosis] : prognosis [Pre-conception Counseling] : pre-conception counseling [Sexuality/Safer Sex] : sexuality/safer sex [Family Planning] : family planning [Partner Notification Info/Discussion] : partner notification info/discussion [HIV info] : HIV info [Condoms] : condoms [Risk Reduction] : risk reduction [PrEP/PEP] : PrEP/PEP [The Topics Listed Above] : the topics listed above [The patient was able to ask questions and explain these concepts in his/her own words] : the patient was able to ask questions and explain these concepts in his/her own words

## 2019-02-05 LAB
25(OH)D3 SERPL-MCNC: 29.8 NG/ML
ALBUMIN SERPL ELPH-MCNC: 4.4 G/DL
ALP BLD-CCNC: 64 U/L
ALT SERPL-CCNC: 21 U/L
ANION GAP SERPL CALC-SCNC: 10 MMOL/L
APPEARANCE: CLEAR
AST SERPL-CCNC: 25 U/L
BASOPHILS # BLD AUTO: 0.02 K/UL
BASOPHILS NFR BLD AUTO: 0.5 %
BILIRUB SERPL-MCNC: 1 MG/DL
BILIRUBIN URINE: NEGATIVE
BLOOD URINE: NEGATIVE
BUN SERPL-MCNC: 8 MG/DL
C TRACH RRNA SPEC QL NAA+PROBE: NOT DETECTED
CALCIUM SERPL-MCNC: 9.6 MG/DL
CD3 CELLS # BLD: 1288 /UL
CD3 CELLS NFR BLD: 70 %
CD3+CD4+ CELLS # BLD: 140 /UL
CD3+CD4+ CELLS NFR BLD: 8 %
CD3+CD4+ CELLS/CD3+CD8+ CLL SPEC: 0.13 RATIO
CD3+CD8+ CELLS # SPEC: 1054 /UL
CD3+CD8+ CELLS NFR BLD: 57 %
CHLORIDE SERPL-SCNC: 108 MMOL/L
CHOLEST SERPL-MCNC: 124 MG/DL
CHOLEST/HDLC SERPL: 3.2 RATIO
CO2 SERPL-SCNC: 24 MMOL/L
COLOR: YELLOW
CREAT SERPL-MCNC: 0.7 MG/DL
EOSINOPHIL # BLD AUTO: 0.22 K/UL
EOSINOPHIL NFR BLD AUTO: 5.2 %
GLUCOSE QUALITATIVE U: NEGATIVE MG/DL
GLUCOSE SERPL-MCNC: 80 MG/DL
HBA1C MFR BLD HPLC: 5.4 %
HBV CORE IGG+IGM SER QL: NONREACTIVE
HBV SURFACE AB SERPL IA-ACNC: 59.2 MIU/ML
HBV SURFACE AG SER QL: NONREACTIVE
HCT VFR BLD CALC: 40.3 %
HCV AB SER QL: NONREACTIVE
HCV S/CO RATIO: 0.08 S/CO
HDLC SERPL-MCNC: 39 MG/DL
HEPATITIS A IGG ANTIBODY: REACTIVE
HGB BLD-MCNC: 12.7 G/DL
HIV1 RNA # SERPL NAA+PROBE: 112
HIV1 RNA # SERPL NAA+PROBE: ABNORMAL
IMM GRANULOCYTES NFR BLD AUTO: 0.2 %
KETONES URINE: NEGATIVE
LDLC SERPL CALC-MCNC: 59 MG/DL
LEUKOCYTE ESTERASE URINE: ABNORMAL
LPL SERPL-CCNC: 63 U/L
LYMPHOCYTES # BLD AUTO: 1.95 K/UL
LYMPHOCYTES NFR BLD AUTO: 45.8 %
MAN DIFF?: NORMAL
MCHC RBC-ENTMCNC: 26.6 PG
MCHC RBC-ENTMCNC: 31.5 GM/DL
MCV RBC AUTO: 84.3 FL
MONOCYTES # BLD AUTO: 0.33 K/UL
MONOCYTES NFR BLD AUTO: 7.7 %
N GONORRHOEA RRNA SPEC QL NAA+PROBE: NOT DETECTED
NEUTROPHILS # BLD AUTO: 1.73 K/UL
NEUTROPHILS NFR BLD AUTO: 40.6 %
NITRITE URINE: NEGATIVE
PH URINE: 6.5
PLATELET # BLD AUTO: 267 K/UL
POTASSIUM SERPL-SCNC: 4.4 MMOL/L
PROT SERPL-MCNC: 7.9 G/DL
PROTEIN URINE: NEGATIVE MG/DL
RBC # BLD: 4.78 M/UL
RBC # FLD: 15.6 %
SODIUM SERPL-SCNC: 142 MMOL/L
SOURCE AMPLIFICATION: NORMAL
SPECIFIC GRAVITY URINE: 1.02
T PALLIDUM AB SER QL IA: NEGATIVE
TRIGL SERPL-MCNC: 131 MG/DL
UROBILINOGEN URINE: NEGATIVE MG/DL
VIRAL LOAD INTERP: NORMAL
VIRAL LOAD LOG: 2.05
WBC # FLD AUTO: 4.26 K/UL

## 2019-02-06 LAB
M TB IFN-G BLD-IMP: NEGATIVE
QUANTIFERON TB PLUS MITOGEN MINUS NIL: 7.81 IU/ML
QUANTIFERON TB PLUS NIL: 0.05 IU/ML
QUANTIFERON TB PLUS TB1 MINUS NIL: 0 IU/ML
QUANTIFERON TB PLUS TB2 MINUS NIL: -0.01 IU/ML
SOURCE AMPLIFICATION: NORMAL
T VAGINALIS RRNA SPEC QL NAA+PROBE: NOT DETECTED

## 2019-02-27 ENCOUNTER — RX RENEWAL (OUTPATIENT)
Age: 24
End: 2019-02-27

## 2019-03-01 ENCOUNTER — APPOINTMENT (OUTPATIENT)
Dept: PEDIATRIC ALLERGY IMMUNOLOGY | Facility: CLINIC | Age: 24
End: 2019-03-01
Payer: COMMERCIAL

## 2019-03-01 VITALS — WEIGHT: 155.18 LBS | SYSTOLIC BLOOD PRESSURE: 127 MMHG | DIASTOLIC BLOOD PRESSURE: 83 MMHG | HEART RATE: 70 BPM

## 2019-03-01 DIAGNOSIS — O98.711: ICD-10-CM

## 2019-03-01 DIAGNOSIS — Z30.09 ENCOUNTER FOR OTHER GENERAL COUNSELING AND ADVICE ON CONTRACEPTION: ICD-10-CM

## 2019-03-01 DIAGNOSIS — E55.9 VITAMIN D DEFICIENCY, UNSPECIFIED: ICD-10-CM

## 2019-03-01 PROCEDURE — 36415 COLL VENOUS BLD VENIPUNCTURE: CPT

## 2019-03-01 PROCEDURE — 99215 OFFICE O/P EST HI 40 MIN: CPT | Mod: 25

## 2019-03-01 RX ORDER — MEDROXYPROGESTERONE ACETATE 150 MG/ML
150 INJECTION, SUSPENSION INTRAMUSCULAR
Qty: 0 | Refills: 0 | Status: COMPLETED | OUTPATIENT
Start: 2019-03-01

## 2019-03-01 RX ADMIN — MEDROXYPROGESTERONE ACETATE 0 MG/ML: 150 INJECTION, SUSPENSION INTRAMUSCULAR at 00:00

## 2019-03-01 NOTE — DISCUSSION/SUMMARY
[VL Stable, no change needed] : VL Stable, no change needed [] : needed for CHACHA [15 min] : 15 min [HIV Education] : HIV Education [Transmission] : transmission [ARV Therapy] : ARV therapy [Universal Precautions] : universal precautions [Treatment Education] : treatment education [Drug Interactions] : drug interactions [Immune System] : immune system [Treatment Adherence] : treatment adherence [Anticipatory Guidance] : anticipatory guidance [Prognosis] : prognosis [Risk Reduction] : risk reduction [Pre-conception Counseling] : pre-conception counseling [Condoms] : condoms [PrEP/PEP] : PrEP/PEP [The Topics Listed Above] : the topics listed above [The patient was able to ask questions and explain these concepts in his/her own words] : the patient was able to ask questions and explain these concepts in his/her own words

## 2019-03-04 ENCOUNTER — TRANSCRIPTION ENCOUNTER (OUTPATIENT)
Age: 24
End: 2019-03-04

## 2019-03-18 ENCOUNTER — RX RENEWAL (OUTPATIENT)
Age: 24
End: 2019-03-18

## 2019-03-18 DIAGNOSIS — L03.90 CELLULITIS, UNSPECIFIED: ICD-10-CM

## 2019-03-27 ENCOUNTER — LABORATORY RESULT (OUTPATIENT)
Age: 24
End: 2019-03-27

## 2019-03-27 ENCOUNTER — APPOINTMENT (OUTPATIENT)
Dept: PEDIATRIC ALLERGY IMMUNOLOGY | Facility: CLINIC | Age: 24
End: 2019-03-27
Payer: COMMERCIAL

## 2019-03-27 VITALS — DIASTOLIC BLOOD PRESSURE: 87 MMHG | OXYGEN SATURATION: 98 % | SYSTOLIC BLOOD PRESSURE: 131 MMHG | HEART RATE: 76 BPM

## 2019-03-27 DIAGNOSIS — Z91.89 OTHER SPECIFIED PERSONAL RISK FACTORS, NOT ELSEWHERE CLASSIFIED: ICD-10-CM

## 2019-03-27 DIAGNOSIS — Z29.8 ENCOUNTER FOR OTHER SPECIFIED PROPHYLACTIC MEASURES: ICD-10-CM

## 2019-03-27 DIAGNOSIS — Z87.898 PERSONAL HISTORY OF OTHER SPECIFIED CONDITIONS: ICD-10-CM

## 2019-03-27 DIAGNOSIS — Z91.19 PATIENT'S NONCOMPLIANCE WITH OTHER MEDICAL TREATMENT AND REGIMEN: ICD-10-CM

## 2019-03-27 DIAGNOSIS — Z51.81 ENCOUNTER FOR THERAPEUTIC DRUG LVL MONITORING: ICD-10-CM

## 2019-03-27 DIAGNOSIS — Z71.7 HUMAN IMMUNODEFICIENCY VIRUS [HIV] COUNSELING: ICD-10-CM

## 2019-03-27 PROCEDURE — 36416 COLLJ CAPILLARY BLOOD SPEC: CPT

## 2019-03-27 PROCEDURE — 99215 OFFICE O/P EST HI 40 MIN: CPT

## 2019-03-27 RX ORDER — VALACYCLOVIR 1 G/1
1 TABLET, FILM COATED ORAL
Qty: 30 | Refills: 3 | Status: DISCONTINUED | COMMUNITY
Start: 2018-03-14 | End: 2019-03-27

## 2019-03-27 RX ORDER — METRONIDAZOLE 500 MG/1
500 TABLET ORAL TWICE DAILY
Qty: 14 | Refills: 0 | Status: DISCONTINUED | COMMUNITY
Start: 2018-12-10 | End: 2019-03-27

## 2019-03-27 RX ORDER — CEPHALEXIN 500 MG/1
500 CAPSULE ORAL 4 TIMES DAILY
Qty: 28 | Refills: 0 | Status: DISCONTINUED | COMMUNITY
Start: 2019-03-18 | End: 2019-03-27

## 2019-03-27 RX ORDER — CHROMIUM 200 MCG
1000 TABLET ORAL
Qty: 30 | Refills: 2 | Status: DISCONTINUED | COMMUNITY
Start: 2019-02-27 | End: 2019-03-27

## 2019-03-27 RX ORDER — NAPROXEN SODIUM 550 MG/1
550 TABLET ORAL
Qty: 20 | Refills: 3 | Status: DISCONTINUED | COMMUNITY
Start: 2019-03-18 | End: 2019-03-27

## 2019-03-29 ENCOUNTER — MESSAGE (OUTPATIENT)
Age: 24
End: 2019-03-29

## 2019-03-29 DIAGNOSIS — R10.2 PELVIC AND PERINEAL PAIN: ICD-10-CM

## 2019-03-29 PROBLEM — Z51.81 ENCOUNTER FOR MEDICATION MONITORING: Status: ACTIVE | Noted: 2017-06-14

## 2019-03-29 NOTE — DISCUSSION/SUMMARY
[15 min] : 15 min [HIV Education] : HIV Education [Transmission] : transmission [ARV Therapy] : ARV therapy [Universal Precautions] : universal precautions [Treatment Education] : treatment education [Drug Interactions] : drug interactions [Immune System] : immune system [Treatment Adherence] : treatment adherence [Anticipatory Guidance] : anticipatory guidance [Prognosis] : prognosis [Risk Reduction] : risk reduction [Pre-conception Counseling] : pre-conception counseling

## 2019-03-29 NOTE — PHYSICAL EXAM
[Alert] : alert [Well Nourished] : well nourished [Healthy Appearance] : healthy appearance [No Acute Distress] : no acute distress [Well Developed] : well developed [Normal Pupil & Iris Size/Symmetry] : normal pupil and iris size and symmetry [No Discharge] : no discharge [No Photophobia] : no photophobia [Sclera Not Icteric] : sclera not icteric [Normal TMs] : both tympanic membranes were normal [Normal Nasal Mucosa] : the nasal mucosa was normal [Normal Lips/Tongue] : the lips and tongue were normal [Normal Outer Ear/Nose] : the ears and nose were normal in appearance [Normal Tonsils] : normal tonsils [No Thrush] : no thrush [Normal Dentition] : normal dentition [No Oral Lesions or Ulcers] : no oral lesions or ulcers [Supple] : the neck was supple [Normal Rate and Effort] : normal respiratory rhythm and effort [Normal Palpation] : palpation of the chest revealed no abnormalities [No Crackles] : no crackles [No Retractions] : no retractions [Bilateral Audible Breath Sounds] : bilateral audible breath sounds [Normal Rate] : heart rate was normal  [Normal S1, S2] : normal S1 and S2 [No murmur] : no murmur [Regular Rhythm] : with a regular rhythm [Soft] : abdomen soft [Not Tender] : non-tender [Not Distended] : not distended [No HSM] : no hepato-splenomegaly [Labia Majora Erythema] : erythema of the labia majora [Labia Minora Erythema] : erythema of the labia minora [Normal Cervical Lymph Nodes] : cervical [Normal Axillary Lumph Nodes] : axillary [Skin Intact] : skin intact  [No Rash] : no rash [No Skin Lesions] : no skin lesions [No Joint Swelling or Erythema] : no joint swelling or erythema [No clubbing] : no clubbing [No Edema] : no edema [No Cyanosis] : no cyanosis [Normal Mood] : mood was normal [Normal Affect] : affect was normal [Alert, Awake, Oriented as Age-Appropriate] : alert, awake, oriented as age appropriate [Discharge] : no urethral discharge [de-identified] : tearful [de-identified] : External genital exam performed with Joan Nur RN present in the room . See image below.

## 2019-03-29 NOTE — DATA REVIEWED
[FreeTextEntry1] : Obtained lab results from [facility].  Staph epi only when placed into broth (neg primary plates).  No fungal or viral culture done.

## 2019-03-29 NOTE — HISTORY OF PRESENT ILLNESS
[FreeTextEntry1] : SHABNAM JAMES is a 23 year old, female seen on 03/27/2019 for HSV non responding to therapy. \par Symptoms have persisted for 2 months.  Cultures done at [facility] but not at [facility].  \par \par Pt has pain with walking.  Largest lesion is inflamed and touches clitoral edmondson causing severe pain with ambulation.  Pain is only resolved with Aquaphor topically, and a triple antibiotic with pain relief.  \par \par Lesions are still draining serous yellow fluid.\par \par HSV2 IgG positive, IgM negative  3/1/19.  \par \par

## 2019-03-29 NOTE — HISTORY OF PRESENT ILLNESS
[FreeTextEntry1] : SHABNAM JAMES is a 23 year old, female seen on 03/01/2019 for  followup.\par \par Pt has taken all does of cARV, no missed doses.  This is a new accomplishment, 2nd month in, and she's working hard at this and doing great.  \par \par Pt has HSV2 outbreack.  Started > 1 mo ago, saw GYN 2 weeks ago (2/12/19) and was started on Valtrex 1 g bid and also topic acylovir cream.  This did not resolve sympotms. I changed to Famcyclovir 250 mg tid and the patient tood the first dose today this morning.  Continue to use topica acyclovir cream PRN. \par \par For Depo-porvera today.  Pt has had spotting occasionally since Jan 16, it occurs daily but is mostly spotting.  Occasionaly she has bleeding,b ut not often.  \par \par NO other complaints. \par

## 2019-04-02 ENCOUNTER — INPATIENT (INPATIENT)
Facility: HOSPITAL | Age: 24
LOS: 6 days | Discharge: HOME | End: 2019-04-09
Attending: HOSPITALIST | Admitting: HOSPITALIST
Payer: COMMERCIAL

## 2019-04-02 VITALS
HEART RATE: 75 BPM | OXYGEN SATURATION: 100 % | DIASTOLIC BLOOD PRESSURE: 90 MMHG | SYSTOLIC BLOOD PRESSURE: 151 MMHG | WEIGHT: 148.81 LBS | TEMPERATURE: 98 F | RESPIRATION RATE: 18 BRPM

## 2019-04-02 LAB
ALBUMIN SERPL ELPH-MCNC: 4.2 G/DL — SIGNIFICANT CHANGE UP (ref 3.3–5)
ALP SERPL-CCNC: 66 U/L — SIGNIFICANT CHANGE UP (ref 40–120)
ALT FLD-CCNC: 16 U/L — SIGNIFICANT CHANGE UP (ref 4–33)
ANION GAP SERPL CALC-SCNC: 14 MMO/L — SIGNIFICANT CHANGE UP (ref 7–14)
AST SERPL-CCNC: 23 U/L — SIGNIFICANT CHANGE UP (ref 4–32)
BASOPHILS # BLD AUTO: 0.03 K/UL — SIGNIFICANT CHANGE UP (ref 0–0.2)
BASOPHILS NFR BLD AUTO: 0.5 % — SIGNIFICANT CHANGE UP (ref 0–2)
BILIRUB SERPL-MCNC: 0.6 MG/DL — SIGNIFICANT CHANGE UP (ref 0.2–1.2)
BUN SERPL-MCNC: 13 MG/DL — SIGNIFICANT CHANGE UP (ref 7–23)
CALCIUM SERPL-MCNC: 9.4 MG/DL — SIGNIFICANT CHANGE UP (ref 8.4–10.5)
CHLORIDE SERPL-SCNC: 104 MMOL/L — SIGNIFICANT CHANGE UP (ref 98–107)
CO2 SERPL-SCNC: 21 MMOL/L — LOW (ref 22–31)
CREAT SERPL-MCNC: 0.79 MG/DL — SIGNIFICANT CHANGE UP (ref 0.5–1.3)
EOSINOPHIL # BLD AUTO: 0.41 K/UL — SIGNIFICANT CHANGE UP (ref 0–0.5)
EOSINOPHIL NFR BLD AUTO: 6.7 % — HIGH (ref 0–6)
GLUCOSE SERPL-MCNC: 82 MG/DL — SIGNIFICANT CHANGE UP (ref 70–99)
HCT VFR BLD CALC: 41.7 % — SIGNIFICANT CHANGE UP (ref 34.5–45)
HGB BLD-MCNC: 13 G/DL — SIGNIFICANT CHANGE UP (ref 11.5–15.5)
IMM GRANULOCYTES NFR BLD AUTO: 0.2 % — SIGNIFICANT CHANGE UP (ref 0–1.5)
LYMPHOCYTES # BLD AUTO: 2.9 K/UL — SIGNIFICANT CHANGE UP (ref 1–3.3)
LYMPHOCYTES # BLD AUTO: 47.2 % — HIGH (ref 13–44)
MCHC RBC-ENTMCNC: 26.9 PG — LOW (ref 27–34)
MCHC RBC-ENTMCNC: 31.2 % — LOW (ref 32–36)
MCV RBC AUTO: 86.3 FL — SIGNIFICANT CHANGE UP (ref 80–100)
MONOCYTES # BLD AUTO: 0.53 K/UL — SIGNIFICANT CHANGE UP (ref 0–0.9)
MONOCYTES NFR BLD AUTO: 8.6 % — SIGNIFICANT CHANGE UP (ref 2–14)
NEUTROPHILS # BLD AUTO: 2.27 K/UL — SIGNIFICANT CHANGE UP (ref 1.8–7.4)
NEUTROPHILS NFR BLD AUTO: 36.8 % — LOW (ref 43–77)
NRBC # FLD: 0 K/UL — SIGNIFICANT CHANGE UP (ref 0–0)
PLATELET # BLD AUTO: 246 K/UL — SIGNIFICANT CHANGE UP (ref 150–400)
PMV BLD: 10.4 FL — SIGNIFICANT CHANGE UP (ref 7–13)
POTASSIUM SERPL-MCNC: 3.7 MMOL/L — SIGNIFICANT CHANGE UP (ref 3.5–5.3)
POTASSIUM SERPL-SCNC: 3.7 MMOL/L — SIGNIFICANT CHANGE UP (ref 3.5–5.3)
PROT SERPL-MCNC: 7.9 G/DL — SIGNIFICANT CHANGE UP (ref 6–8.3)
RBC # BLD: 4.83 M/UL — SIGNIFICANT CHANGE UP (ref 3.8–5.2)
RBC # FLD: 14.8 % — HIGH (ref 10.3–14.5)
SODIUM SERPL-SCNC: 139 MMOL/L — SIGNIFICANT CHANGE UP (ref 135–145)
WBC # BLD: 6.15 K/UL — SIGNIFICANT CHANGE UP (ref 3.8–10.5)
WBC # FLD AUTO: 6.15 K/UL — SIGNIFICANT CHANGE UP (ref 3.8–10.5)

## 2019-04-02 RX ORDER — ACYCLOVIR SODIUM 500 MG
340 VIAL (EA) INTRAVENOUS EVERY 8 HOURS
Refills: 0 | Status: DISCONTINUED | OUTPATIENT
Start: 2019-04-02 | End: 2019-04-03

## 2019-04-02 RX ADMIN — Medication 106.8 MILLIGRAM(S): at 22:54

## 2019-04-02 NOTE — ED PROVIDER NOTE - OBJECTIVE STATEMENT
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 Pt states she has genital herpes virus 2 lesions in the vaginal area since December, has been following her ID physician, treated with Valacyclovir for 3 months, no improvement, and treated with Famciclovir for 3 weeks without improvement. Admits having serous drainage since the outbreak and very painful, burning on urination. Denies any fever, chills, n/v/d, abdominal pain, hematuria, or any other complaints. 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 Pt states she has genital herpes virus 2 lesions in the vaginal area since December, has been following her immunologist treated with Valacyclovir for 3 months, no improvement, and treated with Famciclovir for 3 weeks without improvement. Admits having serous drainage since the outbreak and very painful, burning on urination. Denies any fever, chills, n/v/d, abdominal pain, hematuria, or any other complaints.

## 2019-04-02 NOTE — ED PROVIDER NOTE - CLINICAL SUMMARY MEDICAL DECISION MAKING FREE TEXT BOX
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 Plan: check labs, ua, admit for IV antiviral.

## 2019-04-02 NOTE — ED PROVIDER NOTE - ATTENDING CONTRIBUTION TO CARE
DR. FIELD, ATTENDING MD-  I performed a face to face bedside interview with patient regarding history of present illness, review of symptoms and past medical history. I completed an independent physical exam.  I have discussed patient's plan of care with PA.   Documentation as above in the note.    22 y/o female h/o hiv on haart, last cd4 count 129, IRIS sent by her immunologist, Dr. Hoover, for iv antivirals.  Per pt, she has had genital herpes since December which are worsening despite po antiviral therapy.  Dr. Hoover recommended she go to ED for admission for iv antivirals and ID consultation.  Denies f/c, ha, neck stiffness, cp, sob, cough, abd pain, n/v/d, dysuria.  Afebrile, vs wnl, anxious appearing, ctabil, s1s2 rrr no m/r/g, abd soft non ttp no r/g, no cva tenderness b/l, no leg swelling b/l.  Failed o/p antiviral therapy with low cd4 count, will obtain labs, give iv antivirals, admit for further care and specialty evaluation.

## 2019-04-02 NOTE — ED ADULT TRIAGE NOTE - NS ED NOTE AC HIGH RISK COUNTRIES
Bilateral ear irrigated using Rhino Ear Washer with luke warm water and hydrogen peroxide . Large and small pieces of brown cerumen irrigated. TM: wnl. Pt tolerated well. No

## 2019-04-02 NOTE — ED PROVIDER NOTE - GENITOURINARY VULVA
ULCERATIONS/multiple ulcerated lesions noted external vulva and labia majora & minora, serous drainage, mild swelling (pt refused for physical exam due to tenderness.

## 2019-04-02 NOTE — ED ADULT NURSE NOTE - NSIMPLEMENTINTERV_GEN_ALL_ED
Implemented All Universal Safety Interventions:  [place] to call system. Call bell, personal items and telephone within reach. Instruct patient to call for assistance. Room bathroom lighting operational. Non-slip footwear when patient is off stretcher. Physically safe environment: no spills, clutter or unnecessary equipment. Stretcher in lowest position, wheels locked, appropriate side rails in place.

## 2019-04-02 NOTE — ED PROVIDER NOTE - PROGRESS NOTE DETAILS
DIDIER ANDERSEN: IV Acyclovir ordered. spoke with hospitalist, recommends GYN evaluation for genital herpes and admission. GYN consulted. Will continue to monitor and reassess. DIDIER ANDERSEN: Spoke GYN, pending attg evaluation. As per attending, Dr. Rowe, spoke with hospitalist, still wants GYN evaluation; states okay to accept patient if GYN doesn't admit patient. Will continue to monitor and reassess. DIDIER ANDERSEN: Spoke with GYN, states to admit to Dr. Duckworth. 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

## 2019-04-02 NOTE — ED ADULT NURSE NOTE - OBJECTIVE STATEMENT
22 yo presents to intake room 5 for vaginal pain + pain on urination, hx HIV and vaginal lesions, ED PA at bedside for pt eval, 20G placed in RAC, labs drawn and sent

## 2019-04-02 NOTE — ED ADULT TRIAGE NOTE - CHIEF COMPLAINT QUOTE
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 pt states has elevated cd4 counts. pt with vaginal pain

## 2019-04-03 DIAGNOSIS — A60.04 HERPESVIRAL VULVOVAGINITIS: ICD-10-CM

## 2019-04-03 DIAGNOSIS — Z29.9 ENCOUNTER FOR PROPHYLACTIC MEASURES, UNSPECIFIED: ICD-10-CM

## 2019-04-03 DIAGNOSIS — B20 HUMAN IMMUNODEFICIENCY VIRUS [HIV] DISEASE: ICD-10-CM

## 2019-04-03 DIAGNOSIS — R52 PAIN, UNSPECIFIED: ICD-10-CM

## 2019-04-03 LAB
APPEARANCE UR: SIGNIFICANT CHANGE UP
BACTERIA # UR AUTO: SIGNIFICANT CHANGE UP
BILIRUB UR-MCNC: NEGATIVE — SIGNIFICANT CHANGE UP
BLOOD UR QL VISUAL: SIGNIFICANT CHANGE UP
COLOR SPEC: YELLOW — SIGNIFICANT CHANGE UP
GLUCOSE UR-MCNC: NEGATIVE — SIGNIFICANT CHANGE UP
KETONES UR-MCNC: NEGATIVE — SIGNIFICANT CHANGE UP
LEUKOCYTE ESTERASE UR-ACNC: HIGH
NITRITE UR-MCNC: NEGATIVE — SIGNIFICANT CHANGE UP
PH UR: 7 — SIGNIFICANT CHANGE UP (ref 5–8)
PROT UR-MCNC: 100 — HIGH
RBC CASTS # UR COMP ASSIST: HIGH (ref 0–?)
SP GR SPEC: > 1.04 — HIGH (ref 1–1.04)
SQUAMOUS # UR AUTO: SIGNIFICANT CHANGE UP
UROBILINOGEN FLD QL: 1 — SIGNIFICANT CHANGE UP
WBC UR QL: >50 — HIGH (ref 0–?)

## 2019-04-03 PROCEDURE — 99223 1ST HOSP IP/OBS HIGH 75: CPT

## 2019-04-03 PROCEDURE — 99223 1ST HOSP IP/OBS HIGH 75: CPT | Mod: GC

## 2019-04-03 PROCEDURE — 99232 SBSQ HOSP IP/OBS MODERATE 35: CPT | Mod: GC

## 2019-04-03 RX ORDER — ACETAMINOPHEN 500 MG
1000 TABLET ORAL ONCE
Refills: 0 | Status: COMPLETED | OUTPATIENT
Start: 2019-04-03 | End: 2019-04-03

## 2019-04-03 RX ORDER — GABAPENTIN 400 MG/1
300 CAPSULE ORAL EVERY 8 HOURS
Refills: 0 | Status: DISCONTINUED | OUTPATIENT
Start: 2019-04-03 | End: 2019-04-03

## 2019-04-03 RX ORDER — ENOXAPARIN SODIUM 100 MG/ML
40 INJECTION SUBCUTANEOUS DAILY
Refills: 0 | Status: DISCONTINUED | OUTPATIENT
Start: 2019-04-03 | End: 2019-04-09

## 2019-04-03 RX ORDER — FOSCARNET SODIUM 24 MG/ML
2000 INJECTION, SOLUTION INTRAVENOUS EVERY 8 HOURS
Refills: 0 | Status: DISCONTINUED | OUTPATIENT
Start: 2019-04-03 | End: 2019-04-09

## 2019-04-03 RX ORDER — ATAZANAVIR 200 MG/1
300 CAPSULE ORAL DAILY
Refills: 0 | Status: DISCONTINUED | OUTPATIENT
Start: 2019-04-03 | End: 2019-04-09

## 2019-04-03 RX ORDER — RITONAVIR 100 MG/1
100 TABLET, FILM COATED ORAL DAILY
Refills: 0 | Status: DISCONTINUED | OUTPATIENT
Start: 2019-04-03 | End: 2019-04-09

## 2019-04-03 RX ORDER — AZITHROMYCIN 500 MG/1
1200 TABLET, FILM COATED ORAL
Refills: 0 | Status: DISCONTINUED | OUTPATIENT
Start: 2019-04-03 | End: 2019-04-03

## 2019-04-03 RX ORDER — EMTRICITABINE AND TENOFOVIR DISOPROXIL FUMARATE 200; 300 MG/1; MG/1
1 TABLET, FILM COATED ORAL DAILY
Refills: 0 | Status: DISCONTINUED | OUTPATIENT
Start: 2019-04-03 | End: 2019-04-09

## 2019-04-03 RX ORDER — ACETAMINOPHEN 500 MG
1000 TABLET ORAL ONCE
Refills: 0 | Status: DISCONTINUED | OUTPATIENT
Start: 2019-04-03 | End: 2019-04-03

## 2019-04-03 RX ORDER — AZITHROMYCIN 500 MG/1
1200 TABLET, FILM COATED ORAL
Refills: 0 | Status: DISCONTINUED | OUTPATIENT
Start: 2019-04-03 | End: 2019-04-09

## 2019-04-03 RX ORDER — ACETAMINOPHEN 500 MG
1000 TABLET ORAL ONCE
Refills: 0 | Status: DISCONTINUED | OUTPATIENT
Start: 2019-04-03 | End: 2019-04-09

## 2019-04-03 RX ORDER — LIDOCAINE 4 G/100G
1 CREAM TOPICAL THREE TIMES A DAY
Refills: 0 | Status: DISCONTINUED | OUTPATIENT
Start: 2019-04-03 | End: 2019-04-09

## 2019-04-03 RX ADMIN — LIDOCAINE 1 APPLICATION(S): 4 CREAM TOPICAL at 13:24

## 2019-04-03 RX ADMIN — Medication 106.8 MILLIGRAM(S): at 13:25

## 2019-04-03 RX ADMIN — Medication 400 MILLIGRAM(S): at 12:28

## 2019-04-03 RX ADMIN — LIDOCAINE 1 APPLICATION(S): 4 CREAM TOPICAL at 21:52

## 2019-04-03 RX ADMIN — FOSCARNET SODIUM 166.67 MILLIGRAM(S): 24 INJECTION, SOLUTION INTRAVENOUS at 23:11

## 2019-04-03 RX ADMIN — RITONAVIR 100 MILLIGRAM(S): 100 TABLET, FILM COATED ORAL at 11:54

## 2019-04-03 RX ADMIN — Medication 106.8 MILLIGRAM(S): at 05:54

## 2019-04-03 RX ADMIN — Medication 400 MILLIGRAM(S): at 18:07

## 2019-04-03 RX ADMIN — ATAZANAVIR 300 MILLIGRAM(S): 200 CAPSULE ORAL at 11:54

## 2019-04-03 RX ADMIN — Medication 1 TABLET(S): at 09:10

## 2019-04-03 RX ADMIN — EMTRICITABINE AND TENOFOVIR DISOPROXIL FUMARATE 1 TABLET(S): 200; 300 TABLET, FILM COATED ORAL at 11:53

## 2019-04-03 RX ADMIN — ENOXAPARIN SODIUM 40 MILLIGRAM(S): 100 INJECTION SUBCUTANEOUS at 18:55

## 2019-04-03 RX ADMIN — Medication 500 MILLIGRAM(S): at 18:07

## 2019-04-03 NOTE — H&P ADULT - HISTORY OF PRESENT ILLNESS
GYNECOLOGIC CONSULT NOTE    23y G  P  Last Menstrual Period    presents with    OB/GYN HISTORY:     Surgical History:        Past Medical History:   Genital herpes  HIV (human immunodeficiency virus infection)      No Known Allergies      acyclovir IVPB 340 milliGRAM(s) IV Intermittent every 8 hours      FAMILY HISTORY:      Social History:     REVIEW OF SYSTEMS:    CONSTITUTIONAL: No fever, weight loss, or fatigue  EYES: No eye pain, visual disturbances, or discharge  ENMT:  No difficulty hearing, tinnitus, vertigo; No sinus or throat pain  NECK: No pain or stiffness  BREASTS: No pain, masses, or nipple discharge  RESPIRATORY: No cough, wheezing, chills or hemoptysis; No shortness of breath  CARDIOVASCULAR: No chest pain, palpitations, dizziness, or leg swelling  GASTROINTESTINAL: No abdominal or epigastric pain. No nausea, vomiting, or hematemesis; No diarrhea or constipation. No melena or hematochezia.  GENITOURINARY: No dysuria, frequency, hematuria, or incontinence  NEUROLOGICAL: No headaches, memory loss, loss of strength, numbness, or tremors  SKIN: No itching, burning, rashes, or lesions   LYMPH NODES: No enlarged glands  ENDOCRINE: No heat or cold intolerance; No hair loss  MUSCULOSKELETAL: No joint pain or swelling; No muscle, back, or extremity pain  PSYCHIATRIC: No depression, anxiety, mood swings, or difficulty sleeping  HEME/LYMPH: No easy bruising, or bleeding gums  ALLERY AND IMMUNOLOGIC: No hives or eczema      MEDICATIONS  (STANDING):  acyclovir IVPB 340 milliGRAM(s) IV Intermittent every 8 hours    MEDICATIONS  (PRN):      OBJECTIVE FINDINGS:    Vital Signs Last 24 Hrs  T(C): 36.7 (03 Apr 2019 00:35), Max: 36.8 (02 Apr 2019 19:20)  T(F): 98.1 (03 Apr 2019 00:35), Max: 98.2 (02 Apr 2019 19:20)  HR: 71 (03 Apr 2019 00:35) (71 - 75)  BP: 141/86 (03 Apr 2019 00:35) (141/86 - 151/90)  BP(mean): --  RR: 16 (03 Apr 2019 00:35) (16 - 18)  SpO2: 100% (03 Apr 2019 00:35) (100% - 100%)    PHYSICAL EXAM:    GENERAL: NAD, well-developed  HEAD:  Atraumatic, Normocephalic  EYES: EOMI, PERRLA, conjunctiva and sclera clear  ENMT: No tonsillar erythema, exudates, or enlargement; Moist mucous membranes, Good dentition, No lesions  NECK: Supple, No JVD, Normal thyroid  NERVOUS SYSTEM:  Alert & Oriented X3, Good concentration; Motor Strength 5/5 B/L upper and lower extremities; DTRs 2+ intact and symmetric  CHEST/LUNG: Clear to percussion bilaterally; No rales, rhonchi, wheezing, or rubs  HEART: Regular rate and rhythm; No murmurs, rubs, or gallops  ABDOMEN: Soft, Nontender, Nondistended; Bowel sounds present, No rebound, No guarding  EXTREMITIES:  2+ Peripheral Pulses, No clubbing, cyanosis, or edema, Jocelyne's sign negative  LYMPH: No lymphadenopathy noted  SKIN: No rashes or lesions  PELVIC:   RECTAL:      LABS:                        13.0   6.15  )-----------( 246      ( 02 Apr 2019 21:25 )             41.7     04-02    139  |  104  |  13  ----------------------------<  82  3.7   |  21<L>  |  0.79    Ca    9.4      02 Apr 2019 21:25    TPro  7.9  /  Alb  4.2  /  TBili  0.6  /  DBili  x   /  AST  23  /  ALT  16  /  AlkPhos  66  04-02          RADIOLOGY & ADDITIONAL STUDIES: 23y , PMSH significant for maternal transmission of HIV+ (CD4 count 129, noncompliant with ART), IRIS syndrome, HSV, sent in to ER by immunologist  for IV antiviral for persistent HSV2 vaginal lesions. Pt states she has genital herpes virus 2 lesions in the vaginal area since December, has been following her immunologist treated with Valacyclovir for 3 months with no improvement, and treated with Famciclovir for 3 weeks without improvement. Admits having serous drainage since the outbreak and very painful, burning on urination. Denies any fever, chills, n/v/d, abdominal pain, hematuria, or any other complaints.     Immunologist Dr. Hoover ([phone])    OB/GYN HISTORY: Dr brennen Adhikari ([place])  G1-TOP  h/o HSV+, s/p colpo-LGSIL  denies fibroids, ovarian cysts    Surgical History:    denies    Past Medical History:   Genital herpes  HIV (human immunodeficiency virus infection)  IRIS syndrome    Allergies  No Known Allergies      Meds:   Accutaine  Lidocaine 5%  Ritonavir qD  Atazanavir  Descovy  Famciclovir  Naproxen  Azithromycin 600 mg on   Bactrim 1 tab 3 times a week (MWF)  Vit D      REVIEW OF SYSTEMS: WNL except as stated in HPI

## 2019-04-03 NOTE — CONSULT NOTE ADULT - PROBLEM SELECTOR RECOMMENDATION 2
- Topical Lidoaine 5% gel to lesions  - Consider IV tordol or IV acetaminophen if symptoms are not controlled with topical treatment  - Aquaphor emollient to lesions decreases topical pain due to contact with local structures

## 2019-04-03 NOTE — CONSULT NOTE ADULT - PROBLEM SELECTOR RECOMMENDATION 9
- Pt is on IV Acyclovir with possible mild improvement of symptoms today (less serous discharge than last week)  - May need to consider topical imiquimod or IV foscarnet if symptoms do not resolve.  - Recommend Adult ID Consult for evaluation

## 2019-04-03 NOTE — PATIENT PROFILE ADULT - NSTRANSFERBELONGINGSDISPO_GEN_A_NUR
cellphone, lap top, clothing, jacket, sweater, medication, one pair earring with white stone, bank card, drivers license./with patient

## 2019-04-03 NOTE — PROGRESS NOTE ADULT - ASSESSMENT
Patient is 22yo  HIV+ (reported CD4 129, noncompliant with ART) HSV+ with ongoing outbreak for 2mos refractory to outpatient treatment, admitted for IV antiviral therapy and pain control.

## 2019-04-03 NOTE — H&P ADULT - ATTENDING COMMENTS
Pt seen and evaluated agree with above resident note.  Pt with HIV and recurrent severe herpes outbreak, failed outpt therapy now admitted for IV therapy.  Pt very uncomfortable and refused an exam.  Pt agreed to separate labia with her own hands.  Multiple herpetic lesions noted as well as a red inflamed area ~3cm from clitoris to introitus on left side, no drainage noted.  Abscess vs cyst, vs inflammation, vs mass.  Will need to evaluate further once pt more comfortable.     Admit for IV acyclovir, continue ART therapy and prophylactic antibiotics  Recommend lidocaine ointment, peribottle for pain.  Also recommend urinating in partially filled bath or sitz bath to decrease discomfort with urination  Above plan d/w oncoming GYN team    Corrie Clifford MD

## 2019-04-03 NOTE — CONSULT NOTE ADULT - PROBLEM SELECTOR RECOMMENDATION 9
- Appreciate ID recs  - CBC with diff, CMP with Mag Phos Q12, calcium  - C/w foscarnet  - C/w IVF. - Appreciate ID recs  - CBC with diff, CMP with Mag Phos Q12, calcium  - C/w foscarnet  - C/w IVF.  - C/w lidocaine. - Appreciate ID recs  - CBC with diff, CMP with Mag Phos Q24, calcium  - C/w foscarnet  - C/w IVF.  - C/w lidocaine.

## 2019-04-03 NOTE — H&P ADULT - ASSESSMENT
-IV acyclovir  -ID consult in AM  -Reg diet  -home medications  -Lidocaine  -Natalie bottle    Pt seen and D/w Dr. Darrin Felton PGY2 23y , PMSH significant for maternal transmission of HIV+ (CD4 count 129, noncompliant with ART), IRIS syndrome, HSV, sent in to ER by immunologist  for IV antiviral for refractory HSV2 vaginal lesions. VSS and labs stable    -IV acyclovir  -ID consult in AM  -Reg diet  -home medications  -Lidocaine topical ointment for pain  -Natalie bottle    Pt seen and D/w Dr. Darrin Felton PGY2

## 2019-04-03 NOTE — CONSULT NOTE ADULT - SUBJECTIVE AND OBJECTIVE BOX
Ana Chaves - [phone]    Patient is a 23y old  Female who presents with a chief complaint of refractory genital herpes (2019 09:38)    HPI:  23F  with maternal transmission of HIV+ (CD4 count 129, noncompliant with ART), IRIS syndrome, HSV, sent in to ER by immunologist  for IV antiviral for persistent HSV2 vaginal lesions. Pt states she has genital herpes virus 2 lesions in the vaginal area since December, has been following her immunologist treated with Valacyclovir for 3 months with no improvement, and treated with Famciclovir for 3 weeks without improvement. Admits having serous drainage since the outbreak and very painful, burning on urination. Denies any fever, chills, n/v/d, abdominal pain, hematuria, or any other complaints.  She states that the discharge from the ulcers is better but the ulcers have not changed in size since being on IV acyclovir.  No lesions elsewhere.  No fever.  No n/v/d.  Pain with urination due to ulcers.      prior hospital charts reviewed [x  ]  primary team notes reviewed [x  ]  other consultant notes reviewed [ x ]    PAST MEDICAL & SURGICAL HISTORY:  Genital herpes  HIV (human immunodeficiency virus infection)    Allergies  No Known Allergies    ANTIMICROBIALS:    acyclovir IVPB 340 every 8 hours  atazanavir 300 daily  azithromycin   Tablet 1200 <User Schedule>  emtricitabine 200 mG/tenofovir alafenamide 25 mG (DESCOVY) Tablet 1 daily  ritonavir Tablet 100 daily  trimethoprim  160 mG/sulfamethoxazole 800 mG 1 <User Schedule>    OTHER MEDS: MEDICATIONS  (STANDING):  acetaminophen  IVPB .. 1000 once  naproxen 500 every 12 hours    SOCIAL HISTORY:   non-smoker; nursing student    FAMILY HISTORY:  mother  of HIV-related disease    REVIEW OF SYSTEMS  [  ] ROS unobtainable because:    [ x ] All other systems negative except as noted below:	    Constitutional:  [ ] fever [ ] chills  [ ] weight loss  [ ] weakness  Skin:  [ ] rash [ ] phlebitis	  Eyes: [ ] icterus [ ] pain  [ ] discharge	  ENMT: [ ] sore throat  [ ] thrush [ ] ulcers [ ] exudates  Respiratory: [ ] dyspnea [ ] hemoptysis [ ] cough [ ] sputum	  Cardiovascular:  [ ] chest pain [ ] palpitations [ ] edema	  Gastrointestinal:  [ ] nausea [ ] vomiting [ ] diarrhea [ ] constipation [ ] pain	  Genitourinary:  [ ] dysuria [ ] frequency [ ] hematuria [ ] discharge [ ] flank pain  [ ] vaginal ulcers  Musculoskeletal:  [ ] myalgias [ ] arthralgias [ ] arthritis  [ ] back pain  Neurological:  [ ] headache [ ] seizures  [ ] confusion/altered mental status  Psychiatric:  [ ] anxiety [ ] depression	  Hematology/Lymphatics:  [ ] lymphadenopathy  Endocrine:  [ ] adrenal [ ] thyroid  Allergic/Immunologic:	 [ ] transplant [ ] seasonal    Vital Signs Last 24 Hrs  T(F): 98.5 (19 @ 13:22), Max: 98.5 (19 @ 04:23)    Vital Signs Last 24 Hrs  HR: 66 (19 @ 13:22) (61 - 75)  BP: 136/89 (19 @ 13:22) (128/75 - 151/90)  RR: 17 (19 @ 13:22)  SpO2: 99% (19 @ :22) (99% - 100%)  Wt(kg): --145 pounds    PHYSICAL EXAM:  General: non-toxic  HEAD/EYES: anicteric  ENT:  supple  Cardiovascular:   S1, S2  Respiratory:  clear bilaterally  GI:  soft, non-tender, normal bowel sounds  :  2 R labial ulcers; 1 large labial ulcer; several other ulcers   Musculoskeletal:  no synovitis  Neurologic:  grossly non-focal  Skin:  no rash elsewhere  Psychiatric:  appropriate affect  Vascular:  no phlebitis                        13.0   6.15  )-----------( 246      ( 2019 21:25 )             41.7     139  |  104  |  13  ----------------------------<  82  3.7   |  21<L>  |  0.79    Ca    9.4      2019 21:25    TPro  7.9  /  Alb  4.2  /  TBili  0.6  /  DBili  x   /  AST  23  /  ALT  16  /  AlkPhos  66  04-02    Urinalysis Basic - ( 2019 13:16 )  Color: YELLOW / Appearance: TURBID / SG: > 1.040 / pH: 7.0  Gluc: NEGATIVE / Ketone: NEGATIVE  / Bili: NEGATIVE / Urobili: 1.0   Blood: SMALL / Protein: 100 / Nitrite: NEGATIVE   Leuk Esterase: LARGE / RBC: 6-10 / WBC >50   Sq Epi: FEW / Non Sq Epi: x / Bacteria: FEW    MICROBIOLOGY:  3/1/19 - WR2=853 (76)  3/1/19 - VL D<30    19 - DQ4=738 (8)  19 -     18 - VL [id number]  18 - CD4=50 (3)    RADIOLOGY:  n/a

## 2019-04-03 NOTE — CONSULT NOTE ADULT - ASSESSMENT
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 Hoover  - continue prophylaxis (bactrim) per Dr. Hoover  - stop acv iv  - foscarnet - 2000mg iv q8  - IVF  - cbc, sma-7, magnesium, calcium, phosphorus  - pain management per primary team  - would consider transfer to medicine    above d/w gyn resident

## 2019-04-03 NOTE — CONSULT NOTE ADULT - SUBJECTIVE AND OBJECTIVE BOX
CHIEF COMPLAINT:  Patient is a 23y old  Female who presents with a chief complaint of refractory genital herpes (2019 06:53)    HPI:  23y , PMSH significant for maternal transmission of HIV+ (CD4 count 129, with history of noncompliant with ART), IRIS syndrome, HSV2, who had  genital herpes virus 2 lesions in the vaginal area since December.  She has been following with OB/GYN, then me since that time,  treated with Valacyclovir (low dose initially, then proper dosing for HIV pos patients) for 6 weeks with no improvement, then treated with Famciclovir for 3 weeks without improvement.   Pt has Admits having serous drainage since the outbreak and very painful, burning on urination. Denies any fever, chills, n/v/d, abdominal pain, hematuria, or any other complaints.     Three weeks ago, there was a secondary superinfection, treated with Keflex x 10 days, and then repeated for 10 days (total 20 days), with resolution of underlying secondary infection.      Pain has increased causing severe pain with urination, pain with movement, walking and ambulation.  Pt has remained bed-bound recently.  Pain has not been managed adequately with Naproxyn 1250 mg daily (max dose), and pt refused narcotics as recently as last week in my office.  Last week Lidocaine 5% was prescribed, but not filled due to prior authorization.  Inflammation and local edema reduced with Naproxen, but sharp pain persists, especially with rubbing caused by ambulation.      HSV viral cultures and sensitivity sent to [facility] core lab 3/27/19.  Results pending.     Immunologist Dr. Hoover ([phone])    OB/GYN HISTORY: Dr brennen Adhikari ([place])  G1-TOP  h/o HSV+, s/p colpo-LGSIL  denies fibroids, ovarian cysts    Surgical History:    denies    Past Medical History:   Genital herpes  HIV (human immunodeficiency virus infection)  IRIS syndrome    Allergies  No Known Allergies      Meds:   Accutaine  Lidocaine 5%  Ritonavir qD  Atazanavir  Descovy  Famciclovir  Naproxen  Azithromycin 600 mg on   Bactrim 1 tab 3 times a week (MWF)  Vit D    Outpatient HIV Provider:  Year of HIV Diagnosis: soon after birth  T cell sylvie:  Highest Viral Load:  Current ARV regimen:  Ritonavir, Atazanavir, Descovy  ARV adherence:  historicallly poor, but over the past 2 months excellent.   Hx of Past Oppurtunistic Infections:    PAST MEDICAL & SURGICAL HISTORY:  Genital herpes  HIV (human immunodeficiency virus infection)  No significant past surgical history    Family History:  FAMILY HISTORY:  Social Hx:          REVIEW OF SYSTEMS: normal except where noted with an "X" below  Constitutional: [ ] fevers [ ] chills [ ] fatigue [ ] malaise [ ] myalgias [ ] arthralgias [ ] weight loss  Eyes: [ ] double vision [ ] eye pain  [ ] visual changes  ENT: [ ] sore throat [ ] odynophagia [ ] mouth pain [ ] rhinorrhea [ ] thrush  CV: [ ] chest pain [ ] shortness of breath  [ ] edema  Respiratory:  [ ] cough [ ] sputum production [ ] wheezing [ ] shortness of breath  GI: [ ] abdominal pain [ ] nausea [ ] vomiting [ ]  constipation [ ] diarrhea  : [ ] suprapubic pain [ ] dysuria [ ] polyuria [ ] penile/vaginal discharge [ X ] genital lesions  Heme/Lymph: [ ] easy bleeding [ ] swollen lymph nodes  Skin: [ ] rashes [ ] skin lesions  Neuro: [ ] headache [ ] neck tenderness [ ] focal motor weakness [ ] sensory changes [ ] paresthesias        PHYSICAL EXAM:  Vital Signs Last 24 Hrs  T(C): 36.8 (2019 09:24), Max: 36.9 (2019 04:23)  T(F): 98.2 (2019 09:24), Max: 98.5 (2019 04:23)  HR: 69 (2019 09:24) (61 - 75)  BP: 130/78 (2019 09:24) (128/75 - 151/90)  BP(mean): --  RR: 18 (2019 09:24) (16 - 18)  SpO2: 100% (2019 09:24) (100% - 100%)    General: no acute distress, well developed, well nurished  Eyes: PEERLA, EOMI, no icterus, no conjunctivitis, no discharge  ENT: no thrush, normal dentica  Neck: non-tender supple  Respiratory: Clear to auscultation bilaterally, no wheeze, no crackles, no rhonchi  CV: respiratory rate normal, , S1S2, no murmurs, rubs or gallops  Abdominal: Soft, Nontender, nondistended, normal bowel sounds  Extremities: No edema, + peripheral pulses, no clubbing, no cyanosis  Neurology: alert, awake, oriented x 3, nonfocal, cranial nerves II-XII grossly intact  : external exam - three lesions.  one on each side of labia majora with serious discharge, one midline adjacent to cliteral edmondson.  Each lesion has a yellow surface with serious discharge and a erythematous deep red base.   Skin:  no rashes, no lesions  Lymph Nodes:  normal cervical and axillary lymph nodes  Psychiatric: normal mood, normal affect    MEDICATIONS  (STANDING):  acyclovir IVPB 340 milliGRAM(s) IV Intermittent every 8 hours  atazanavir 300 milliGRAM(s) Oral daily  azithromycin   Tablet 1200 milliGRAM(s) Oral <User Schedule>  emtricitabine 200 mG/tenofovir alafenamide 25 mG (DESCOVY) Tablet 1 Tablet(s) Oral daily  gabapentin 300 milliGRAM(s) Oral every 8 hours  lidocaine 5% Ointment 1 Application(s) Topical three times a day  naproxen 500 milliGRAM(s) Oral every 12 hours  ritonavir Tablet 100 milliGRAM(s) Oral daily  trimethoprim  160 mG/sulfamethoxazole 800 mG 1 Tablet(s) Oral <User Schedule>    MEDICATIONS  (PRN):  Allergies: No Known Allergies    LABS:                        13.0   6.15  )-----------( 246      ( 2019 21:25 )             41.7                           13.0   6.15  )-----------( 246      ( 2019 21:25 )             41.7     Neutrophils:36.8   Bands:--   Lymphocytes:47.2   Monocytes:8.6   Eosinophils:6.7   Basophils:0.5    @ 21:25    04-02    139  |  104  |  13  ----------------------------<  82  3.7   |  21<L>  |  0.79    Ca    9.4      2019 21:25    TPro  7.9  /  Alb  4.2  /  TBili  0.6  /  DBili  x   /  AST  23  /  ALT  16  /  AlkPhos  66  04-02    LIVER FUNCTIONS - ( 2019 21:25 )  Alb: 4.2 g/dL / Pro: 7.9 g/dL / ALK PHOS: 66 u/L / ALT: 16 u/L / AST: 23 u/L / GGT: x CHIEF COMPLAINT:  Patient is a 23y old  Female who presents with a chief complaint of refractory genital herpes (2019 06:53)    HPI:  23y , PMSH significant for maternal transmission of HIV+ (CD4 count 129, with history of noncompliant with ART), IRIS syndrome, HSV2, who had  genital herpes virus 2 lesions in the vaginal area since December.  She has been following with OB/GYN, then me since that time,  treated with Valacyclovir (low dose initially, then proper dosing for HIV pos patients) for 6 weeks with no improvement, then treated with Famciclovir for 3 weeks without improvement.   Pt has Admits having serous drainage since the outbreak and very painful, burning on urination. Denies any fever, chills, n/v/d, abdominal pain, hematuria, or any other complaints.     Three weeks ago, there was a secondary superinfection, treated with Keflex x 10 days, and then repeated for 10 days (total 20 days), with resolution of underlying secondary infection.      Pain has increased causing severe pain with urination, pain with movement, walking and ambulation.  Pt has remained bed-bound recently.  Pain has not been managed adequately with Naproxyn 1250 mg daily (max dose), and pt refused narcotics as recently as last week in my office.  Last week Lidocaine 5% was prescribed, but not filled due to prior authorization.  Inflammation and local edema reduced with Naproxen, but sharp pain persists, especially with rubbing caused by ambulation.      HSV viral cultures and sensitivity sent to [facility] core lab 3/27/19.  Results pending.     Immunologist Dr. Hoover ([phone])    OB/GYN HISTORY: Dr brennen Adhikari ([place])  G1-TOP  h/o HSV+, s/p colpo-LGSIL  denies fibroids, ovarian cysts    Surgical History:    denies    Past Medical History:   Genital herpes  HIV (human immunodeficiency virus infection)  IRIS syndrome    Allergies  No Known Allergies      Meds:   Accutaine  Lidocaine 5%  Ritonavir qD  Atazanavir  Descovy  Famciclovir  Naproxen  Azithromycin 600 mg on   Bactrim 1 tab 3 times a week (MWF)  Vit D    Outpatient HIV Provider:  Year of HIV Diagnosis: soon after birth  T cell sylvie:  Highest Viral Load:  Current ARV regimen:  Ritonavir, Atazanavir, Descovy  ARV adherence:  historicallly poor, but over the past 2 months excellent.   Hx of Past Oppurtunistic Infections:    PAST MEDICAL & SURGICAL HISTORY:  Genital herpes  HIV (human immunodeficiency virus infection)  No significant past surgical history    Family History:  FAMILY HISTORY: MOther: HIV  Social Hx:  Social EtOH, currently in college, lives with father, exercisign regularly prior to onset of HSV, never a smoker    REVIEW OF SYSTEMS: normal except where noted with an "X" below  Constitutional: [ ] fevers [ ] chills [ ] fatigue [ ] malaise [ ] myalgias [ ] arthralgias [ ] weight loss  Eyes: [ ] double vision [ ] eye pain  [ ] visual changes  ENT: [ ] sore throat [ ] odynophagia [ ] mouth pain [ ] rhinorrhea [ ] thrush  CV: [ ] chest pain [ ] shortness of breath  [ ] edema  Respiratory:  [ ] cough [ ] sputum production [ ] wheezing [ ] shortness of breath  GI: [ ] abdominal pain [ ] nausea [ ] vomiting [ ]  constipation [ ] diarrhea  : [ ] suprapubic pain [ ] dysuria [ ] polyuria [ ] penile/vaginal discharge [ X ] genital lesions  Heme/Lymph: [ ] easy bleeding [ ] swollen lymph nodes  Skin: [ ] rashes [ ] skin lesions  Neuro: [ ] headache [ ] neck tenderness [ ] focal motor weakness [ ] sensory changes [ ] paresthesias    PHYSICAL EXAM:  Vital Signs Last 24 Hrs  T(C): 36.8 (2019 09:24), Max: 36.9 (2019 04:23)  T(F): 98.2 (2019 09:24), Max: 98.5 (2019 04:23)  HR: 69 (2019 09:24) (61 - 75)  BP: 130/78 (2019 09:24) (128/75 - 151/90)  BP(mean): --  RR: 18 (2019 09:24) (16 - 18)  SpO2: 100% (2019 09:24) (100% - 100%)    General: no acute distress, well developed, well nourished  Eyes: PEERLA, EOMI, no icterus, no conjunctivitis, no discharge  ENT: no thrush, normal dentica  Neck: non-tender supple  Respiratory: Clear to auscultation bilaterally, no wheeze, no crackles, no rhonchi  CV: respiratory rate normal, , S1S2, no murmurs, rubs or gallops  Abdominal: Soft, Nontender, nondistended, normal bowel sounds  Extremities: No edema, + peripheral pulses, no clubbing, no cyanosis  Neurology: alert, awake, oriented x 3, nonfocal, cranial nerves II-XII grossly intact  : external exam - three lesions.  one on each side of labia majora with serious discharge, one midline adjacent to cliteral edmondson.  Each lesion has a yellow surface with serious discharge and a erythematous deep red base.   Skin:  no rashes, no lesions  Lymph Nodes:  normal cervical and axillary lymph nodes  Psychiatric: normal mood, normal affect    MEDICATIONS  (STANDING):  acyclovir IVPB 340 milliGRAM(s) IV Intermittent every 8 hours  atazanavir 300 milliGRAM(s) Oral daily  azithromycin   Tablet 1200 milliGRAM(s) Oral <User Schedule>  emtricitabine 200 mG/tenofovir alafenamide 25 mG (DESCOVY) Tablet 1 Tablet(s) Oral daily  gabapentin 300 milliGRAM(s) Oral every 8 hours  lidocaine 5% Ointment 1 Application(s) Topical three times a day  naproxen 500 milliGRAM(s) Oral every 12 hours  ritonavir Tablet 100 milliGRAM(s) Oral daily  trimethoprim  160 mG/sulfamethoxazole 800 mG 1 Tablet(s) Oral <User Schedule>    MEDICATIONS  (PRN):  Allergies: No Known Allergies    LABS:                        13.0   6.15  )-----------( 246      ( 2019 21:25 )             41.7                           13.0   6.15  )-----------( 246      ( 2019 21:25 )             41.7     Neutrophils:36.8   Bands:--   Lymphocytes:47.2   Monocytes:8.6   Eosinophils:6.7   Basophils:0.5   - @ 21:25    04-02    139  |  104  |  13  ----------------------------<  82  3.7   |  21<L>  |  0.79    Ca    9.4      2019 21:25    TPro  7.9  /  Alb  4.2  /  TBili  0.6  /  DBili  x   /  AST  23  /  ALT  16  /  AlkPhos  66  -    LIVER FUNCTIONS - ( 2019 21:25 )  Alb: 4.2 g/dL / Pro: 7.9 g/dL / ALK PHOS: 66 u/L / ALT: 16 u/L / AST: 23 u/L / GGT: x

## 2019-04-03 NOTE — H&P ADULT - NSHPPHYSICALEXAM_GEN_ALL_CORE
OBJECTIVE FINDINGS:    Vital Signs Last 24 Hrs  T(C): 36.7 (03 Apr 2019 00:35), Max: 36.8 (02 Apr 2019 19:20)  T(F): 98.1 (03 Apr 2019 00:35), Max: 98.2 (02 Apr 2019 19:20)  HR: 71 (03 Apr 2019 00:35) (71 - 75)  BP: 141/86 (03 Apr 2019 00:35) (141/86 - 151/90)  BP(mean): --  RR: 16 (03 Apr 2019 00:35) (16 - 18)  SpO2: 100% (03 Apr 2019 00:35) (100% - 100%)    PHYSICAL EXAM:    GENERAL: NAD, well-developed  ABDOMEN: Soft, Nontender, Nondistended; Bowel sounds present, No rebound, No guarding  : Pt refused exam. On inspection, multiple weeping ulcerations and lesions on the labia minora and majora bilaterally. 3 cm lesion from the clitoris to the introitus, Marked inflammation and redness with a possible mass from the clitoris to introitus-unable to fully evaluate 2/2 pt's pain.

## 2019-04-03 NOTE — CONSULT NOTE ADULT - ATTENDING COMMENTS
Agree with above. Patient presents with resistent herpes, needing foscarnet. Consulted for transfer of care as per ID request.  Will take patient onto our service, but discussed with gyn resident necessity of them following daily as consulting service as primary issues are gynecologic in nature and that mass/abscess/herpetic lesion needs to be evaluated by gyn as soon as able to.  Will take over service the morning of 4/4.

## 2019-04-03 NOTE — CONSULT NOTE ADULT - PROBLEM SELECTOR RECOMMENDATION 2
- F/u ID recs  - continue - F/u ID recs  - continue Bactrim for PCP prophylaxis  - Continue antiretrovirals.

## 2019-04-03 NOTE — CONSULT NOTE ADULT - ASSESSMENT
23 year old female with perintal HIV transmission, now AIDS (CD4 last 129 cells/ml) with IRIS due to recent HIV medication complience presenting with a 3 month history of painful HSV2 genital lesions.  The HSV has not responded to PO valcyclovir or famcyclovir.  Pain has not been controlled with maximum doses of NSAIDS.  Topical gel (lidocaine 5%) is most beneficial  Pt admitted for IV antiviral agents, and pain management.

## 2019-04-03 NOTE — H&P ADULT - NSHPLABSRESULTS_GEN_ALL_CORE
LABS:                        13.0   6.15  )-----------( 246      ( 02 Apr 2019 21:25 )             41.7     04-02    139  |  104  |  13  ----------------------------<  82  3.7   |  21<L>  |  0.79    Ca    9.4      02 Apr 2019 21:25    TPro  7.9  /  Alb  4.2  /  TBili  0.6  /  DBili  x   /  AST  23  /  ALT  16  /  AlkPhos  66  04-02          RADIOLOGY & ADDITIONAL STUDIES:

## 2019-04-04 LAB
ANION GAP SERPL CALC-SCNC: 11 MMO/L — SIGNIFICANT CHANGE UP (ref 7–14)
BUN SERPL-MCNC: 7 MG/DL — SIGNIFICANT CHANGE UP (ref 7–23)
CALCIUM SERPL-MCNC: 9.6 MG/DL — SIGNIFICANT CHANGE UP (ref 8.4–10.5)
CHLORIDE SERPL-SCNC: 108 MMOL/L — HIGH (ref 98–107)
CO2 SERPL-SCNC: 21 MMOL/L — LOW (ref 22–31)
CREAT SERPL-MCNC: 1.08 MG/DL — SIGNIFICANT CHANGE UP (ref 0.5–1.3)
GLUCOSE SERPL-MCNC: 86 MG/DL — SIGNIFICANT CHANGE UP (ref 70–99)
HCT VFR BLD CALC: 40.8 % — SIGNIFICANT CHANGE UP (ref 34.5–45)
HGB BLD-MCNC: 13.1 G/DL — SIGNIFICANT CHANGE UP (ref 11.5–15.5)
MAGNESIUM SERPL-MCNC: 1.8 MG/DL — SIGNIFICANT CHANGE UP (ref 1.6–2.6)
MCHC RBC-ENTMCNC: 27.1 PG — SIGNIFICANT CHANGE UP (ref 27–34)
MCHC RBC-ENTMCNC: 32.1 % — SIGNIFICANT CHANGE UP (ref 32–36)
MCV RBC AUTO: 84.3 FL — SIGNIFICANT CHANGE UP (ref 80–100)
NRBC # FLD: 0 K/UL — SIGNIFICANT CHANGE UP (ref 0–0)
PHOSPHATE SERPL-MCNC: 3.2 MG/DL — SIGNIFICANT CHANGE UP (ref 2.5–4.5)
PLATELET # BLD AUTO: 244 K/UL — SIGNIFICANT CHANGE UP (ref 150–400)
PMV BLD: 10.4 FL — SIGNIFICANT CHANGE UP (ref 7–13)
POTASSIUM SERPL-MCNC: 3.9 MMOL/L — SIGNIFICANT CHANGE UP (ref 3.5–5.3)
POTASSIUM SERPL-SCNC: 3.9 MMOL/L — SIGNIFICANT CHANGE UP (ref 3.5–5.3)
RBC # BLD: 4.84 M/UL — SIGNIFICANT CHANGE UP (ref 3.8–5.2)
RBC # FLD: 14.9 % — HIGH (ref 10.3–14.5)
SODIUM SERPL-SCNC: 140 MMOL/L — SIGNIFICANT CHANGE UP (ref 135–145)
WBC # BLD: 4.94 K/UL — SIGNIFICANT CHANGE UP (ref 3.8–10.5)
WBC # FLD AUTO: 4.94 K/UL — SIGNIFICANT CHANGE UP (ref 3.8–10.5)

## 2019-04-04 PROCEDURE — 99233 SBSQ HOSP IP/OBS HIGH 50: CPT

## 2019-04-04 PROCEDURE — 99234 HOSP IP/OBS SM DT SF/LOW 45: CPT

## 2019-04-04 RX ORDER — ACETAMINOPHEN 500 MG
1000 TABLET ORAL ONCE
Refills: 0 | Status: COMPLETED | OUTPATIENT
Start: 2019-04-04 | End: 2019-04-05

## 2019-04-04 RX ADMIN — FOSCARNET SODIUM 166.67 MILLIGRAM(S): 24 INJECTION, SOLUTION INTRAVENOUS at 22:03

## 2019-04-04 RX ADMIN — Medication 500 MILLIGRAM(S): at 19:05

## 2019-04-04 RX ADMIN — EMTRICITABINE AND TENOFOVIR DISOPROXIL FUMARATE 1 TABLET(S): 200; 300 TABLET, FILM COATED ORAL at 11:39

## 2019-04-04 RX ADMIN — ATAZANAVIR 300 MILLIGRAM(S): 200 CAPSULE ORAL at 11:38

## 2019-04-04 RX ADMIN — LIDOCAINE 1 APPLICATION(S): 4 CREAM TOPICAL at 06:43

## 2019-04-04 RX ADMIN — RITONAVIR 100 MILLIGRAM(S): 100 TABLET, FILM COATED ORAL at 11:39

## 2019-04-04 RX ADMIN — LIDOCAINE 1 APPLICATION(S): 4 CREAM TOPICAL at 22:04

## 2019-04-04 RX ADMIN — FOSCARNET SODIUM 166.67 MILLIGRAM(S): 24 INJECTION, SOLUTION INTRAVENOUS at 14:25

## 2019-04-04 RX ADMIN — FOSCARNET SODIUM 166.67 MILLIGRAM(S): 24 INJECTION, SOLUTION INTRAVENOUS at 06:41

## 2019-04-04 RX ADMIN — Medication 500 MILLIGRAM(S): at 06:42

## 2019-04-04 RX ADMIN — LIDOCAINE 1 APPLICATION(S): 4 CREAM TOPICAL at 14:25

## 2019-04-04 RX ADMIN — Medication 500 MILLIGRAM(S): at 18:35

## 2019-04-04 NOTE — PROGRESS NOTE ADULT - ASSESSMENT
22 yo F with HIV (Maternal transmission) who presents for evaluation of genital HSV lesions now started on foscarnet.     Problem/Recommendation - 1:  Problem: Herpetic vulvovaginitis. Recommendation: - Appreciate ID recs  - CBC with diff, CMP with Mag Phos Q24, calcium  - C/w foscarnet  - C/w IVF.  - C/w lidocaine.  - appreciate GYN f/u     Problem/Recommendation - 2:  ·  Problem: AIDS (acquired immunodeficiency syndrome), CD4 <=200/<=14%.  Recommendation: - F/u ID recs  - continue Bactrim for PCP prophylaxis  - Continue antiretrovirals.   - check CD4 count - patient reports being consistent with ART for past 2 months     Problem/Recommendation - 3:  ·  Problem: Need for prophylactic measure.  Recommendation: DVT: Consider lovenox as patient not walking much 2/2 pain.

## 2019-04-04 NOTE — PROGRESS NOTE ADULT - ASSESSMENT
24yo  HIV+  HD#2 a/w admitted for IV antiviral therapy and pain control for refractory HSV therapy    -Continue pain control w/IV Tylenol  -Appreciate Med, Immuno and ID recs  -GYN will continue to follow daily    JOSE ENRIQUE Wang, PGY3  #[id number]

## 2019-04-04 NOTE — PROGRESS NOTE ADULT - ASSESSMENT
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  electrolytes okay.    Suggest  - continue ARV as per Dr. Hoover  - continue prophylaxis (bactrim) per Dr. Hoover  - foscarnet - 2000mg iv q8  - IVF  - follow cbc, sma-7, magnesium, calcium, phosphorus  - pain management per primary team

## 2019-04-05 ENCOUNTER — TRANSCRIPTION ENCOUNTER (OUTPATIENT)
Age: 24
End: 2019-04-05

## 2019-04-05 LAB
4/8 RATIO: 0.16 CELLS/UL — LOW (ref 1.69–2.84)
ABS CD8: 1033 CELLS/UL — HIGH (ref 291–576)
ANION GAP SERPL CALC-SCNC: 11 MMO/L — SIGNIFICANT CHANGE UP (ref 7–14)
BACTERIA UR CULT: SIGNIFICANT CHANGE UP
BUN SERPL-MCNC: 7 MG/DL — SIGNIFICANT CHANGE UP (ref 7–23)
CALCIUM SERPL-MCNC: 10.1 MG/DL — SIGNIFICANT CHANGE UP (ref 8.4–10.5)
CD16+CD56+ CELLS NFR BLD: 13 % — SIGNIFICANT CHANGE UP (ref 6–22)
CD16+CD56+ CELLS NFR SPEC: 290 CELL/UL — SIGNIFICANT CHANGE UP (ref 59–453)
CD19 BLASTS SPEC-ACNC: 25 % — HIGH (ref 8–22)
CD19 BLASTS SPEC-ACNC: 562 CELL/UL — HIGH (ref 105–430)
CD3 BLASTS SPEC-ACNC: 1399 CELLS/UL — SIGNIFICANT CHANGE UP (ref 678–2144)
CD3 BLASTS SPEC-ACNC: 62 % — SIGNIFICANT CHANGE UP (ref 62–83)
CD4 %: 7 % — LOW (ref 43–52)
CD8 %: 45 % — HIGH (ref 17–28)
CHLORIDE SERPL-SCNC: 108 MMOL/L — HIGH (ref 98–107)
CO2 SERPL-SCNC: 23 MMOL/L — SIGNIFICANT CHANGE UP (ref 22–31)
CREAT SERPL-MCNC: 0.95 MG/DL — SIGNIFICANT CHANGE UP (ref 0.5–1.3)
GLUCOSE SERPL-MCNC: 84 MG/DL — SIGNIFICANT CHANGE UP (ref 70–99)
HCT VFR BLD CALC: 39.8 % — SIGNIFICANT CHANGE UP (ref 34.5–45)
HGB BLD-MCNC: 12.8 G/DL — SIGNIFICANT CHANGE UP (ref 11.5–15.5)
MAGNESIUM SERPL-MCNC: 1.8 MG/DL — SIGNIFICANT CHANGE UP (ref 1.6–2.6)
MCHC RBC-ENTMCNC: 26.8 PG — LOW (ref 27–34)
MCHC RBC-ENTMCNC: 32.2 % — SIGNIFICANT CHANGE UP (ref 32–36)
MCV RBC AUTO: 83.4 FL — SIGNIFICANT CHANGE UP (ref 80–100)
NRBC # FLD: 0 K/UL — SIGNIFICANT CHANGE UP (ref 0–0)
PHOSPHATE SERPL-MCNC: 3.3 MG/DL — SIGNIFICANT CHANGE UP (ref 2.5–4.5)
PLATELET # BLD AUTO: 218 K/UL — SIGNIFICANT CHANGE UP (ref 150–400)
PMV BLD: 10.2 FL — SIGNIFICANT CHANGE UP (ref 7–13)
POTASSIUM SERPL-MCNC: 3.8 MMOL/L — SIGNIFICANT CHANGE UP (ref 3.5–5.3)
POTASSIUM SERPL-SCNC: 3.8 MMOL/L — SIGNIFICANT CHANGE UP (ref 3.5–5.3)
RBC # BLD: 4.77 M/UL — SIGNIFICANT CHANGE UP (ref 3.8–5.2)
RBC # FLD: 15 % — HIGH (ref 10.3–14.5)
SODIUM SERPL-SCNC: 142 MMOL/L — SIGNIFICANT CHANGE UP (ref 135–145)
SPECIMEN SOURCE: SIGNIFICANT CHANGE UP
T-CELL CD4 SUBSET PNL BLD: 161 CELL/UL — LOW (ref 431–1434)
WBC # BLD: 4.87 K/UL — SIGNIFICANT CHANGE UP (ref 3.8–10.5)
WBC # FLD AUTO: 4.87 K/UL — SIGNIFICANT CHANGE UP (ref 3.8–10.5)

## 2019-04-05 PROCEDURE — 99234 HOSP IP/OBS SM DT SF/LOW 45: CPT

## 2019-04-05 PROCEDURE — 99232 SBSQ HOSP IP/OBS MODERATE 35: CPT

## 2019-04-05 PROCEDURE — 99233 SBSQ HOSP IP/OBS HIGH 50: CPT

## 2019-04-05 RX ORDER — ACETAMINOPHEN 500 MG
1000 TABLET ORAL ONCE
Refills: 0 | Status: COMPLETED | OUTPATIENT
Start: 2019-04-05 | End: 2019-04-05

## 2019-04-05 RX ADMIN — Medication 500 MILLIGRAM(S): at 18:54

## 2019-04-05 RX ADMIN — ATAZANAVIR 300 MILLIGRAM(S): 200 CAPSULE ORAL at 12:28

## 2019-04-05 RX ADMIN — FOSCARNET SODIUM 166.67 MILLIGRAM(S): 24 INJECTION, SOLUTION INTRAVENOUS at 05:55

## 2019-04-05 RX ADMIN — FOSCARNET SODIUM 166.67 MILLIGRAM(S): 24 INJECTION, SOLUTION INTRAVENOUS at 21:09

## 2019-04-05 RX ADMIN — LIDOCAINE 1 APPLICATION(S): 4 CREAM TOPICAL at 14:40

## 2019-04-05 RX ADMIN — EMTRICITABINE AND TENOFOVIR DISOPROXIL FUMARATE 1 TABLET(S): 200; 300 TABLET, FILM COATED ORAL at 12:28

## 2019-04-05 RX ADMIN — Medication 500 MILLIGRAM(S): at 18:12

## 2019-04-05 RX ADMIN — Medication 1000 MILLIGRAM(S): at 11:30

## 2019-04-05 RX ADMIN — RITONAVIR 100 MILLIGRAM(S): 100 TABLET, FILM COATED ORAL at 12:28

## 2019-04-05 RX ADMIN — Medication 1 TABLET(S): at 08:47

## 2019-04-05 RX ADMIN — Medication 400 MILLIGRAM(S): at 00:01

## 2019-04-05 RX ADMIN — Medication 400 MILLIGRAM(S): at 11:03

## 2019-04-05 RX ADMIN — FOSCARNET SODIUM 166.67 MILLIGRAM(S): 24 INJECTION, SOLUTION INTRAVENOUS at 14:36

## 2019-04-05 RX ADMIN — Medication 1000 MILLIGRAM(S): at 00:30

## 2019-04-05 NOTE — DISCHARGE NOTE PROVIDER - NSDCCAREPROVSEEN_GEN_ALL_CORE_FT
[facility] Medicine, ADS  Christ Real [facility] Medicine, ADS  Christ Real John

## 2019-04-05 NOTE — PROGRESS NOTE ADULT - ASSESSMENT
A/P: 23y HD 3 presenting for IV Antiviral therapy for refractory HSV  Patient is stable and doing well.

## 2019-04-05 NOTE — DISCHARGE NOTE PROVIDER - HOSPITAL COURSE
24 yo F with HIV (Maternal transmission) who presents for evaluation of genital HSV lesions now started on foscarnet.    Herpetic vulvovaginitis.     - Appreciate ID recs    - CBC with diff, CMP with Mag Phos Q24, calcium    - C/w foscarnet    - C/w IVF.    - C/w lidocaine.    - appreciate GYN f/u         AIDS (acquired immunodeficiency syndrome), CD4 <=200/<=14%.      - F/u ID recs    - continue Bactrim for PCP prophylaxis    - Continue antiretrovirals.     - check CD4 count ______     - patient reports being consistent with ART for past 2 months        Need for prophylactic measure.      DVT:  lovenox as patient not walking much 2/2 pain. This is a 24 yo F with HIV (Maternal transmission) who presents for evaluation of genital HSV lesions now started on foscarnet.    Hospital Course    Herpetic vulvovaginitis.     ID consulted  and made recommendations.    GYN consulted     C/w foscarnet    C/w IVF.    C/w lidocaine.        AIDS (acquired immunodeficiency syndrome), CD4 <=200/<=14%.      ID    Continue Bactrim for PCP prophylaxis    Continue antiretrovirals.     Patient reports being consistent with ART for past 2 months        DVT:  lovenox as patient not walking much 2/2 pain. 24 yo F with HIV (Maternal transmission) who presents for evaluation of genital HSV lesions now started on foscarnet.    Hospital Course    Herpetic vulvovaginitis.     ID and GYN consulted     C/w foscarnet    C/w IVF.    C/w lidocaine.        AIDS (acquired immunodeficiency syndrome), CD4 <=200/<=14%.      ID    Continue Bactrim for PCP prophylaxis    Continue antiretrovirals.     Patient reports being consistent with ART for past 2 months        DVT:  lovenox as patient not walking much 2/2 pain. 24 yo F with HIV (Maternal transmission) who presents for evaluation of genital HSV lesions now started on foscarnet.    Hospital Course        ID and GYN consulted for management of Herpetic vulvovaginitis.     C/w foscarnet    C/w IVF.    C/w lidocaine.        AIDS (acquired immunodeficiency syndrome), CD4 <=200/<=14%.      ID    Continue Bactrim for PCP prophylaxis    Continue antiretrovirals.     Patient reports being consistent with ART for past 2 months        DVT:  lovenox as patient not walking much 2/2 pain. 22 yo F with HIV (Maternal transmission) who presents for evaluation of genital HSV lesions now started on foscarnet.    ID and GYN consulted for management of Herpetic vulvovaginitis, treated w/IV foscarnet, IVF, herpetic lesions with significant improvement and now optimized for discharge with IV home infusion Ab    AIDS - Bactrim for PCP prophylaxis, antiretrovirals. 22 yo F with HIV (Maternal transmission) who presents for evaluation of genital HSV lesions now started on foscarnet.    ID and GYN consulted for management of Herpetic vulvovaginitis, treated w/IV foscarnet, IVF, herpetic lesions with significant improvement and now optimized for discharge with IV home infusion of foscarnet. Dispo discussed with ID attending.    AIDS - Bactrim for PCP prophylaxis, antiretrovirals. Has outpatient follow-up in place

## 2019-04-05 NOTE — DISCHARGE NOTE PROVIDER - CARE PROVIDER_API CALL
Ana Chaves)  Infectious Disease; Internal Medicine  [address] [place] [address]  Phone: [phone]  Fax: [phone]  Follow Up Time:

## 2019-04-05 NOTE — PROGRESS NOTE ADULT - ASSESSMENT
22 yo F with HIV (Maternal transmission) who presents for evaluation of genital HSV lesions now started on foscarnet.     Problem/Recommendation - 1:  Problem: Herpetic vulvovaginitis. Recommendation: - Appreciate ID recs  - CBC with diff, CMP with Mag Phos Q24, calcium  - C/w foscarnet  - C/w IVF.  - C/w lidocaine.  - appreciate GYN f/u     Problem/Recommendation - 2:  ·  Problem: AIDS (acquired immunodeficiency syndrome), CD4 <=200/<=14%.  Recommendation: - F/u ID recs  - continue Bactrim for PCP prophylaxis  - Continue antiretrovirals.   - CD4 count improved from prior     Problem/Recommendation - 3:  ·  Problem: Need for prophylactic measure.  Recommendation: DVT: Consider lovenox as patient not walking much 2/2 pain.

## 2019-04-05 NOTE — PROGRESS NOTE ADULT - ASSESSMENT
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  electrolytes okay.    Suggest  - continue ARV as per Dr. Hoover  - continue prophylaxis (bactrim) per Dr. Hoover  - foscarnet - 2000mg iv q8  - IVF  - follow cbc, sma-7, magnesium, calcium, phosphorus  - pain management per primary team  - would not use topical ointments - try to keep area dry    above d/w NP    Please call the ID service [phone] with questions or concerns over the weekend

## 2019-04-05 NOTE — DISCHARGE NOTE PROVIDER - NSDCCPCAREPLAN_GEN_ALL_CORE_FT
PRINCIPAL DISCHARGE DIAGNOSIS  Diagnosis: Herpetic vulvovaginitis  Assessment and Plan of Treatment: ID and GYN consulted and made recommendations.      SECONDARY DISCHARGE DIAGNOSES  Diagnosis: Pain aggravated by changing postions  Assessment and Plan of Treatment:     Diagnosis: AIDS (acquired immunodeficiency syndrome), CD4 <=200/<=14%  Assessment and Plan of Treatment: PRINCIPAL DISCHARGE DIAGNOSIS  Diagnosis: Herpetic vulvovaginitis  Assessment and Plan of Treatment: Foscarnet 2600mg IV every 12 hrs with IVF - for another 2 weeks then plan will be re evaluated by dr Chaves (ID) follow up as scheduled on  4/17-9:30 AM  -  BMP, magnesium, calcium, phosphorus - twice weekly (tues/fri)  - Do not use topical ointments - try to keep area dry        SECONDARY DISCHARGE DIAGNOSES  Diagnosis: AIDS (acquired immunodeficiency syndrome), CD4 <=200/<=14%  Assessment and Plan of Treatment: continue ARV and Bactrim as per Dr. Hoover    Diagnosis: Pain aggravated by changing postions  Assessment and Plan of Treatment:

## 2019-04-06 DIAGNOSIS — A60.04 HERPESVIRAL VULVOVAGINITIS: ICD-10-CM

## 2019-04-06 LAB
ANION GAP SERPL CALC-SCNC: 10 MMO/L — SIGNIFICANT CHANGE UP (ref 7–14)
BUN SERPL-MCNC: 9 MG/DL — SIGNIFICANT CHANGE UP (ref 7–23)
CALCIUM SERPL-MCNC: 9.7 MG/DL — SIGNIFICANT CHANGE UP (ref 8.4–10.5)
CHLORIDE SERPL-SCNC: 110 MMOL/L — HIGH (ref 98–107)
CO2 SERPL-SCNC: 21 MMOL/L — LOW (ref 22–31)
CREAT SERPL-MCNC: 1.01 MG/DL — SIGNIFICANT CHANGE UP (ref 0.5–1.3)
GLUCOSE SERPL-MCNC: 84 MG/DL — SIGNIFICANT CHANGE UP (ref 70–99)
HCT VFR BLD CALC: 39.3 % — SIGNIFICANT CHANGE UP (ref 34.5–45)
HGB BLD-MCNC: 12.4 G/DL — SIGNIFICANT CHANGE UP (ref 11.5–15.5)
MAGNESIUM SERPL-MCNC: 1.7 MG/DL — SIGNIFICANT CHANGE UP (ref 1.6–2.6)
MCHC RBC-ENTMCNC: 27 PG — SIGNIFICANT CHANGE UP (ref 27–34)
MCHC RBC-ENTMCNC: 31.6 % — LOW (ref 32–36)
MCV RBC AUTO: 85.4 FL — SIGNIFICANT CHANGE UP (ref 80–100)
NRBC # FLD: 0 K/UL — SIGNIFICANT CHANGE UP (ref 0–0)
PHOSPHATE SERPL-MCNC: 3.5 MG/DL — SIGNIFICANT CHANGE UP (ref 2.5–4.5)
PLATELET # BLD AUTO: 210 K/UL — SIGNIFICANT CHANGE UP (ref 150–400)
PMV BLD: 10.2 FL — SIGNIFICANT CHANGE UP (ref 7–13)
POTASSIUM SERPL-MCNC: 3.7 MMOL/L — SIGNIFICANT CHANGE UP (ref 3.5–5.3)
POTASSIUM SERPL-SCNC: 3.7 MMOL/L — SIGNIFICANT CHANGE UP (ref 3.5–5.3)
RBC # BLD: 4.6 M/UL — SIGNIFICANT CHANGE UP (ref 3.8–5.2)
RBC # FLD: 14.9 % — HIGH (ref 10.3–14.5)
SODIUM SERPL-SCNC: 141 MMOL/L — SIGNIFICANT CHANGE UP (ref 135–145)
WBC # BLD: 4.53 K/UL — SIGNIFICANT CHANGE UP (ref 3.8–10.5)
WBC # FLD AUTO: 4.53 K/UL — SIGNIFICANT CHANGE UP (ref 3.8–10.5)

## 2019-04-06 PROCEDURE — 99232 SBSQ HOSP IP/OBS MODERATE 35: CPT

## 2019-04-06 PROCEDURE — 99233 SBSQ HOSP IP/OBS HIGH 50: CPT

## 2019-04-06 RX ORDER — SODIUM CHLORIDE 9 MG/ML
1000 INJECTION INTRAMUSCULAR; INTRAVENOUS; SUBCUTANEOUS
Refills: 0 | Status: DISCONTINUED | OUTPATIENT
Start: 2019-04-06 | End: 2019-04-09

## 2019-04-06 RX ADMIN — FOSCARNET SODIUM 166.67 MILLIGRAM(S): 24 INJECTION, SOLUTION INTRAVENOUS at 14:50

## 2019-04-06 RX ADMIN — RITONAVIR 100 MILLIGRAM(S): 100 TABLET, FILM COATED ORAL at 11:35

## 2019-04-06 RX ADMIN — EMTRICITABINE AND TENOFOVIR DISOPROXIL FUMARATE 1 TABLET(S): 200; 300 TABLET, FILM COATED ORAL at 11:35

## 2019-04-06 RX ADMIN — LIDOCAINE 1 APPLICATION(S): 4 CREAM TOPICAL at 14:51

## 2019-04-06 RX ADMIN — ATAZANAVIR 300 MILLIGRAM(S): 200 CAPSULE ORAL at 11:34

## 2019-04-06 RX ADMIN — FOSCARNET SODIUM 166.67 MILLIGRAM(S): 24 INJECTION, SOLUTION INTRAVENOUS at 22:25

## 2019-04-06 RX ADMIN — SODIUM CHLORIDE 75 MILLILITER(S): 9 INJECTION INTRAMUSCULAR; INTRAVENOUS; SUBCUTANEOUS at 11:26

## 2019-04-06 RX ADMIN — FOSCARNET SODIUM 166.67 MILLIGRAM(S): 24 INJECTION, SOLUTION INTRAVENOUS at 05:36

## 2019-04-06 RX ADMIN — AZITHROMYCIN 1200 MILLIGRAM(S): 500 TABLET, FILM COATED ORAL at 08:51

## 2019-04-06 NOTE — CHART NOTE - NSCHARTNOTEFT_GEN_A_CORE
Spoke with ID Dr. Mccain, as patient on foscarnet Dr. Mccain recs to start IVF NS @ 75ml/hr as pt with slight Cr elevation to 1. Will order fluids x12 hrs. Will monitor Cr.

## 2019-04-06 NOTE — PROGRESS NOTE ADULT - ASSESSMENT
24 yo F with HIV (Maternal transmission) who presents for evaluation of genital HSV lesions now started on foscarnet.     Problem/Recommendation - 1:  Problem: Herpetic vulvovaginitis. Recommendation: - Appreciate ID recs  - lesions, pain improving  - C/w foscarnet  - C/w IVF.  - C/w lidocaine.  - appreciate GYN f/u     Problem/Recommendation - 2:  ·  Problem: AIDS (acquired immunodeficiency syndrome), CD4 <=200/<=14%.  Recommendation: - F/u ID recs  - continue Bactrim for PCP prophylaxis  - Continue antiretrovirals.   - CD4 count improved from prior     Problem/Recommendation - 3:  ·  Problem: Need for prophylactic measure.  Recommendation: DVT: Consider lovenox as patient not walking much 2/2 pain.

## 2019-04-06 NOTE — PROGRESS NOTE ADULT - ASSESSMENT
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  electrolytes okay.    Suggest  - continue ART  Continue foscarnet  IV hydration  Monitor CBC and creatinine   tailor plan for ID issues  per course,results.Will defer to primary team on management of other issues.  case d/w ADS team  Infectious Diseases Service will cover over rest of weekend.  Please call if issues,Dr Chaves  will resume care Monday.

## 2019-04-07 LAB
ANION GAP SERPL CALC-SCNC: 10 MMO/L — SIGNIFICANT CHANGE UP (ref 7–14)
APPEARANCE UR: CLEAR — SIGNIFICANT CHANGE UP
BACTERIA # UR AUTO: NEGATIVE — SIGNIFICANT CHANGE UP
BILIRUB UR-MCNC: NEGATIVE — SIGNIFICANT CHANGE UP
BLOOD UR QL VISUAL: HIGH
BUN SERPL-MCNC: 7 MG/DL — SIGNIFICANT CHANGE UP (ref 7–23)
CALCIUM SERPL-MCNC: 10.2 MG/DL — SIGNIFICANT CHANGE UP (ref 8.4–10.5)
CHLORIDE SERPL-SCNC: 107 MMOL/L — SIGNIFICANT CHANGE UP (ref 98–107)
CO2 SERPL-SCNC: 22 MMOL/L — SIGNIFICANT CHANGE UP (ref 22–31)
COLOR SPEC: SIGNIFICANT CHANGE UP
CREAT SERPL-MCNC: 0.85 MG/DL — SIGNIFICANT CHANGE UP (ref 0.5–1.3)
GLUCOSE SERPL-MCNC: 82 MG/DL — SIGNIFICANT CHANGE UP (ref 70–99)
GLUCOSE UR-MCNC: NEGATIVE — SIGNIFICANT CHANGE UP
HCT VFR BLD CALC: 41.1 % — SIGNIFICANT CHANGE UP (ref 34.5–45)
HGB BLD-MCNC: 13.1 G/DL — SIGNIFICANT CHANGE UP (ref 11.5–15.5)
HYALINE CASTS # UR AUTO: SIGNIFICANT CHANGE UP
KETONES UR-MCNC: NEGATIVE — SIGNIFICANT CHANGE UP
LEUKOCYTE ESTERASE UR-ACNC: SIGNIFICANT CHANGE UP
MAGNESIUM SERPL-MCNC: 1.6 MG/DL — SIGNIFICANT CHANGE UP (ref 1.6–2.6)
MCHC RBC-ENTMCNC: 27.3 PG — SIGNIFICANT CHANGE UP (ref 27–34)
MCHC RBC-ENTMCNC: 31.9 % — LOW (ref 32–36)
MCV RBC AUTO: 85.8 FL — SIGNIFICANT CHANGE UP (ref 80–100)
NITRITE UR-MCNC: NEGATIVE — SIGNIFICANT CHANGE UP
NRBC # FLD: 0 K/UL — SIGNIFICANT CHANGE UP (ref 0–0)
PH UR: 6 — SIGNIFICANT CHANGE UP (ref 5–8)
PHOSPHATE SERPL-MCNC: 3.7 MG/DL — SIGNIFICANT CHANGE UP (ref 2.5–4.5)
PLATELET # BLD AUTO: 228 K/UL — SIGNIFICANT CHANGE UP (ref 150–400)
PMV BLD: 10.8 FL — SIGNIFICANT CHANGE UP (ref 7–13)
POTASSIUM SERPL-MCNC: 3.7 MMOL/L — SIGNIFICANT CHANGE UP (ref 3.5–5.3)
POTASSIUM SERPL-SCNC: 3.7 MMOL/L — SIGNIFICANT CHANGE UP (ref 3.5–5.3)
PROT UR-MCNC: NEGATIVE — SIGNIFICANT CHANGE UP
RBC # BLD: 4.79 M/UL — SIGNIFICANT CHANGE UP (ref 3.8–5.2)
RBC # FLD: 15.1 % — HIGH (ref 10.3–14.5)
RBC CASTS # UR COMP ASSIST: SIGNIFICANT CHANGE UP (ref 0–?)
SODIUM SERPL-SCNC: 139 MMOL/L — SIGNIFICANT CHANGE UP (ref 135–145)
SP GR SPEC: 1.01 — SIGNIFICANT CHANGE UP (ref 1–1.04)
SQUAMOUS # UR AUTO: SIGNIFICANT CHANGE UP
UROBILINOGEN FLD QL: NORMAL — SIGNIFICANT CHANGE UP
WBC # BLD: 5.75 K/UL — SIGNIFICANT CHANGE UP (ref 3.8–10.5)
WBC # FLD AUTO: 5.75 K/UL — SIGNIFICANT CHANGE UP (ref 3.8–10.5)
WBC UR QL: HIGH (ref 0–?)
YEAST BUDDING # UR COMP ASSIST: SIGNIFICANT CHANGE UP

## 2019-04-07 PROCEDURE — 99233 SBSQ HOSP IP/OBS HIGH 50: CPT

## 2019-04-07 RX ADMIN — EMTRICITABINE AND TENOFOVIR DISOPROXIL FUMARATE 1 TABLET(S): 200; 300 TABLET, FILM COATED ORAL at 11:47

## 2019-04-07 RX ADMIN — FOSCARNET SODIUM 166.67 MILLIGRAM(S): 24 INJECTION, SOLUTION INTRAVENOUS at 05:31

## 2019-04-07 RX ADMIN — ATAZANAVIR 300 MILLIGRAM(S): 200 CAPSULE ORAL at 11:47

## 2019-04-07 RX ADMIN — RITONAVIR 100 MILLIGRAM(S): 100 TABLET, FILM COATED ORAL at 11:47

## 2019-04-07 RX ADMIN — FOSCARNET SODIUM 166.67 MILLIGRAM(S): 24 INJECTION, SOLUTION INTRAVENOUS at 21:40

## 2019-04-07 RX ADMIN — FOSCARNET SODIUM 166.67 MILLIGRAM(S): 24 INJECTION, SOLUTION INTRAVENOUS at 14:09

## 2019-04-07 NOTE — PROGRESS NOTE ADULT - ASSESSMENT
22 yo F with HIV (Maternal transmission) who presents for evaluation of genital HSV lesions now started on foscarnet.     Problem/Recommendation - 1:  Problem: Herpetic vulvovaginitis. Recommendation: - Appreciate ID recs  - call IR for PICC on Monday in anticipation of need for IV foscarnet as outpatient to complete course - duration per ID  - lesions, pain improving  - C/w foscarnet  - C/w IVF.  - C/w lidocaine.  - appreciate GYN f/u     Problem/Recommendation - 2:  ·  Problem: AIDS (acquired immunodeficiency syndrome), CD4 <=200/<=14%.  Recommendation: - F/u ID recs  - continue Bactrim for PCP prophylaxis  - Continue antiretrovirals.   - CD4 count improved from prior     Problem/Recommendation - 3:  ·  Problem: Need for prophylactic measure.  Recommendation: DVT: Consider lovenox as patient not walking much 2/2 pain.

## 2019-04-08 ENCOUNTER — TRANSCRIPTION ENCOUNTER (OUTPATIENT)
Age: 24
End: 2019-04-08

## 2019-04-08 DIAGNOSIS — E83.42 HYPOMAGNESEMIA: ICD-10-CM

## 2019-04-08 DIAGNOSIS — B20 HUMAN IMMUNODEFICIENCY VIRUS [HIV] DISEASE: ICD-10-CM

## 2019-04-08 DIAGNOSIS — Z29.9 ENCOUNTER FOR PROPHYLACTIC MEASURES, UNSPECIFIED: ICD-10-CM

## 2019-04-08 LAB
ANION GAP SERPL CALC-SCNC: 12 MMO/L — SIGNIFICANT CHANGE UP (ref 7–14)
BUN SERPL-MCNC: 5 MG/DL — LOW (ref 7–23)
CALCIUM SERPL-MCNC: 10.7 MG/DL — HIGH (ref 8.4–10.5)
CHLORIDE SERPL-SCNC: 106 MMOL/L — SIGNIFICANT CHANGE UP (ref 98–107)
CO2 SERPL-SCNC: 24 MMOL/L — SIGNIFICANT CHANGE UP (ref 22–31)
CREAT SERPL-MCNC: 0.84 MG/DL — SIGNIFICANT CHANGE UP (ref 0.5–1.3)
GLUCOSE SERPL-MCNC: 108 MG/DL — HIGH (ref 70–99)
HCT VFR BLD CALC: 40.6 % — SIGNIFICANT CHANGE UP (ref 34.5–45)
HGB BLD-MCNC: 12.8 G/DL — SIGNIFICANT CHANGE UP (ref 11.5–15.5)
MAGNESIUM SERPL-MCNC: 1.5 MG/DL — LOW (ref 1.6–2.6)
MCHC RBC-ENTMCNC: 27.2 PG — SIGNIFICANT CHANGE UP (ref 27–34)
MCHC RBC-ENTMCNC: 31.5 % — LOW (ref 32–36)
MCV RBC AUTO: 86.2 FL — SIGNIFICANT CHANGE UP (ref 80–100)
NRBC # FLD: 0 K/UL — SIGNIFICANT CHANGE UP (ref 0–0)
PHOSPHATE SERPL-MCNC: 3.7 MG/DL — SIGNIFICANT CHANGE UP (ref 2.5–4.5)
PLATELET # BLD AUTO: 215 K/UL — SIGNIFICANT CHANGE UP (ref 150–400)
PMV BLD: 10.6 FL — SIGNIFICANT CHANGE UP (ref 7–13)
POTASSIUM SERPL-MCNC: 3.8 MMOL/L — SIGNIFICANT CHANGE UP (ref 3.5–5.3)
POTASSIUM SERPL-SCNC: 3.8 MMOL/L — SIGNIFICANT CHANGE UP (ref 3.5–5.3)
RBC # BLD: 4.71 M/UL — SIGNIFICANT CHANGE UP (ref 3.8–5.2)
RBC # FLD: 14.9 % — HIGH (ref 10.3–14.5)
SODIUM SERPL-SCNC: 142 MMOL/L — SIGNIFICANT CHANGE UP (ref 135–145)
WBC # BLD: 6.7 K/UL — SIGNIFICANT CHANGE UP (ref 3.8–10.5)
WBC # FLD AUTO: 6.7 K/UL — SIGNIFICANT CHANGE UP (ref 3.8–10.5)

## 2019-04-08 PROCEDURE — 36573 INSJ PICC RS&I 5 YR+: CPT

## 2019-04-08 PROCEDURE — 99233 SBSQ HOSP IP/OBS HIGH 50: CPT

## 2019-04-08 RX ORDER — FOSCARNET SODIUM 24 MG/ML
2500 INJECTION, SOLUTION INTRAVENOUS
Qty: 70000 | Refills: 0
Start: 2019-04-08 | End: 2019-04-21

## 2019-04-08 RX ORDER — FOSCARNET SODIUM 24 MG/ML
2600 INJECTION, SOLUTION INTRAVENOUS
Qty: 72800 | Refills: 0
Start: 2019-04-08 | End: 2019-04-21

## 2019-04-08 RX ORDER — SODIUM CHLORIDE 9 MG/ML
1000 INJECTION INTRAMUSCULAR; INTRAVENOUS; SUBCUTANEOUS
Refills: 0 | Status: DISCONTINUED | OUTPATIENT
Start: 2019-04-08 | End: 2019-04-09

## 2019-04-08 RX ORDER — FOSCARNET SODIUM 24 MG/ML
83.33 INJECTION, SOLUTION INTRAVENOUS
Qty: 9332 | Refills: 0
Start: 2019-04-08 | End: 2019-04-21

## 2019-04-08 RX ORDER — MAGNESIUM SULFATE 500 MG/ML
1 VIAL (ML) INJECTION ONCE
Refills: 0 | Status: COMPLETED | OUTPATIENT
Start: 2019-04-08 | End: 2019-04-08

## 2019-04-08 RX ADMIN — Medication 1 TABLET(S): at 08:04

## 2019-04-08 RX ADMIN — FOSCARNET SODIUM 166.67 MILLIGRAM(S): 24 INJECTION, SOLUTION INTRAVENOUS at 15:56

## 2019-04-08 RX ADMIN — FOSCARNET SODIUM 166.67 MILLIGRAM(S): 24 INJECTION, SOLUTION INTRAVENOUS at 23:40

## 2019-04-08 RX ADMIN — Medication 100 GRAM(S): at 08:04

## 2019-04-08 RX ADMIN — ATAZANAVIR 300 MILLIGRAM(S): 200 CAPSULE ORAL at 11:03

## 2019-04-08 RX ADMIN — FOSCARNET SODIUM 166.67 MILLIGRAM(S): 24 INJECTION, SOLUTION INTRAVENOUS at 05:42

## 2019-04-08 RX ADMIN — RITONAVIR 100 MILLIGRAM(S): 100 TABLET, FILM COATED ORAL at 11:04

## 2019-04-08 RX ADMIN — EMTRICITABINE AND TENOFOVIR DISOPROXIL FUMARATE 1 TABLET(S): 200; 300 TABLET, FILM COATED ORAL at 11:04

## 2019-04-08 RX ADMIN — SODIUM CHLORIDE 75 MILLILITER(S): 9 INJECTION INTRAMUSCULAR; INTRAVENOUS; SUBCUTANEOUS at 08:04

## 2019-04-08 NOTE — CHART NOTE - NSCHARTNOTEFT_GEN_A_CORE
PRE-INTERVENTIONAL RADIOLOGY PROCEDURE NOTE    Patient: 24 y/o female     Procedure: Picc line for IV foscarnet    Diagnosis / Indication: refractory genital HSV-2 infection -    Interventional Radiology Attending Physician:     Ordering Attending Physician: Dr Real     Pertinent medical history: congenital HIV    Pertinent labs:                       12.8   6.70  )-----------( 215      ( 08 Apr 2019 06:19 )             40.6     04-08    142  |  106  |  5<L>  ----------------------------<  108<H>  3.8   |  24  |  0.84    Ca    10.7<H>      08 Apr 2019 06:19  Phos  3.7     04-08  Mg     1.5     04-08          Patient aware? Yes       Frances Booth ADSNP pager [phone]

## 2019-04-08 NOTE — PROGRESS NOTE ADULT - ASSESSMENT
24 yo F with HIV (Maternal transmission) who presents for evaluation of genital HSV lesions now started on foscarnet.     Problem/Recommendation - 1:  Problem: Herpetic vulvovaginitis. Recommendation: - Appreciate ID recs  - call IR for PICC on Monday in anticipation of need for IV foscarnet as outpatient to complete course - duration per ID  - lesions, pain improving  - C/w foscarnet  - C/w IVF.  - C/w lidocaine.  - appreciate GYN f/u     Problem/Recommendation - 2:  ·  Problem: AIDS (acquired immunodeficiency syndrome), CD4 <=200/<=14%.  Recommendation: - F/u ID recs  - continue Bactrim for PCP prophylaxis  - Continue antiretrovirals.   - CD4 count improved from prior     Problem/Recommendation - 3:  ·  Problem: Need for prophylactic measure.  Recommendation: DVT: Consider lovenox as patient not walking much 2/2 pain. 24 yo F with HIV (Maternal transmission) who presents for evaluation of genital HSV lesions now started on foscarnet.

## 2019-04-08 NOTE — DISCHARGE NOTE NURSING/CASE MANAGEMENT/SOCIAL WORK - NSSCNAMETXT_GEN_ALL_CORE
[facility] infusion  [phone]   Rn to call to schedule home visit for teaching home infusion services 4/9/19 [facility] infusion  [phone]   Rn to call to schedule home visit for teaching home infusion services 4/9/ 19 for 5pm home dose teaching  pt in agreement

## 2019-04-08 NOTE — DISCHARGE NOTE NURSING/CASE MANAGEMENT/SOCIAL WORK - NSDCDPATPORTLINK_GEN_ALL_CORE
You can access the [facility], offered by [facility], by registering with the following website: [email or web address]

## 2019-04-08 NOTE — PROGRESS NOTE ADULT - ASSESSMENT
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  electrolytes okay.  She is on D#6 of IV foscarnet.      Suggest  - continue ARV as per Dr. Hoover  - continue prophylaxis (bactrim) per Dr. Hoover  - foscarnet - can discharge on 2600mg iv q12 with IVF - for another 2 weeks - might stop before that and change to aldara pending f/u visit with me  - follow BMP, magnesium, calcium, phosphorus - twice weekly (tues/fri)  - would not use topical ointments - try to keep area dry  - f/u 4/17-9:30a  - midline/picc  - d/c planning  - hospitalist to outline recommendations of electrolyte repletion suggestions if outpatient levels are low (by mouth)    above d/w hospitalist/NP

## 2019-04-09 VITALS
OXYGEN SATURATION: 100 % | HEART RATE: 99 BPM | RESPIRATION RATE: 17 BRPM | TEMPERATURE: 99 F | DIASTOLIC BLOOD PRESSURE: 77 MMHG | SYSTOLIC BLOOD PRESSURE: 137 MMHG

## 2019-04-09 LAB
ANION GAP SERPL CALC-SCNC: 12 MMO/L — SIGNIFICANT CHANGE UP (ref 7–14)
BUN SERPL-MCNC: 8 MG/DL — SIGNIFICANT CHANGE UP (ref 7–23)
CALCIUM SERPL-MCNC: 10.6 MG/DL — HIGH (ref 8.4–10.5)
CHLORIDE SERPL-SCNC: 104 MMOL/L — SIGNIFICANT CHANGE UP (ref 98–107)
CO2 SERPL-SCNC: 25 MMOL/L — SIGNIFICANT CHANGE UP (ref 22–31)
CREAT SERPL-MCNC: 0.91 MG/DL — SIGNIFICANT CHANGE UP (ref 0.5–1.3)
GLUCOSE SERPL-MCNC: 79 MG/DL — SIGNIFICANT CHANGE UP (ref 70–99)
MAGNESIUM SERPL-MCNC: 1.6 MG/DL — SIGNIFICANT CHANGE UP (ref 1.6–2.6)
PHOSPHATE SERPL-MCNC: 3.6 MG/DL — SIGNIFICANT CHANGE UP (ref 2.5–4.5)
POTASSIUM SERPL-MCNC: 3.6 MMOL/L — SIGNIFICANT CHANGE UP (ref 3.5–5.3)
POTASSIUM SERPL-SCNC: 3.6 MMOL/L — SIGNIFICANT CHANGE UP (ref 3.5–5.3)
SODIUM SERPL-SCNC: 141 MMOL/L — SIGNIFICANT CHANGE UP (ref 135–145)

## 2019-04-09 PROCEDURE — 99232 SBSQ HOSP IP/OBS MODERATE 35: CPT

## 2019-04-09 PROCEDURE — 99239 HOSP IP/OBS DSCHRG MGMT >30: CPT

## 2019-04-09 RX ADMIN — RITONAVIR 100 MILLIGRAM(S): 100 TABLET, FILM COATED ORAL at 11:59

## 2019-04-09 RX ADMIN — ATAZANAVIR 300 MILLIGRAM(S): 200 CAPSULE ORAL at 11:59

## 2019-04-09 RX ADMIN — EMTRICITABINE AND TENOFOVIR DISOPROXIL FUMARATE 1 TABLET(S): 200; 300 TABLET, FILM COATED ORAL at 11:59

## 2019-04-09 RX ADMIN — FOSCARNET SODIUM 166.67 MILLIGRAM(S): 24 INJECTION, SOLUTION INTRAVENOUS at 07:25

## 2019-04-09 NOTE — PROGRESS NOTE ADULT - PROBLEM SELECTOR PROBLEM 2
AIDS (acquired immunodeficiency syndrome), CD4 <=200/<=14%
AIDS (acquired immunodeficiency syndrome), CD4 <=200/<=14%

## 2019-04-09 NOTE — PROGRESS NOTE ADULT - ASSESSMENT
22 yo F with HIV (Maternal transmission) who presents for evaluation of genital HSV lesions now started on foscarnet.

## 2019-04-09 NOTE — PROGRESS NOTE ADULT - PROBLEM SELECTOR PLAN 2
c/w ART and PPx per ID  Outpatient f/u in place
Apprec ID f/u  c/w ART and PPx per ID  Outpatient f/u

## 2019-04-09 NOTE — PROGRESS NOTE ADULT - ASSESSMENT
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  electrolytes okay.  She is on D#6 of IV foscarnet.      Suggest  - continue ARV as per Dr. Hoover  - continue prophylaxis (bactrim) per Dr. Hoover  - foscarnet - can discharge on 2600mg iv q12 with IVF - for another 2 weeks - might stop before that and change to aldara pending f/u visit with me  - follow BMP, magnesium, calcium, phosphorus - twice weekly (tues/fri)  - would not use topical ointments - try to keep area dry  - f/u 4/17-9:30a  - d/c planning

## 2019-04-09 NOTE — PROGRESS NOTE ADULT - REASON FOR ADMISSION
refractory genital herpes

## 2019-04-09 NOTE — PROGRESS NOTE ADULT - SUBJECTIVE AND OBJECTIVE BOX
Patient is a 23y old  Female who presents with a chief complaint of refractory genital herpes (03 Apr 2019 09:38)    f/u herpes    Interval History/ROS:  no fever/chills.  no n/v/d.  no abdominal pain.  some dysuria better with topical lidocaine.  thinks lesions look improved.  Remainder of ROS otherwise negative.  appreciate medicine consult note    PAST MEDICAL & SURGICAL HISTORY:  Genital herpes  HIV (human immunodeficiency virus infection)    Allergies  No Known Allergies    ANTIMICROBIALS:    atazanavir 300 daily  azithromycin   Tablet 1200 <User Schedule>  emtricitabine 200 mG/tenofovir alafenamide 25 mG (DESCOVY) Tablet 1 daily  foscarnet (Peripheral) IVPB 2000 every 8 hours  ritonavir Tablet 100 daily  trimethoprim  160 mG/sulfamethoxazole 800 mG 1 <User Schedule>    MEDICATIONS  (STANDING):  acetaminophen  IVPB .. 1000 once  enoxaparin Injectable 40 daily  naproxen 500 every 12 hours    Vital Signs Last 24 Hrs  T(F): 98.4 (04-04-19 @ 09:55), Max: 98.8 (04-04-19 @ 06:44)  HR: 70 (04-04-19 @ 09:55)  BP: 128/89 (04-04-19 @ 09:55)  RR: 18 (04-04-19 @ 09:55)  SpO2: 98% (04-04-19 @ 09:55) (97% - 100%)  weight 145 pounds - 67 kg    PHYSICAL EXAM:  General: non-toxic  HEAD/EYES: anicteric  ENT:  supple  Cardiovascular:   S1, S2  Respiratory:  clear bilaterally  GI:  soft, non-tender, normal bowel sounds  :  2 R labial ulcers; 1 large left labial ulcer; several other ulcers - not sure if they are better or not  Musculoskeletal:  no synovitis  Neurologic:  grossly non-focal  Skin:  no rash elsewhere  Psychiatric:  appropriate affect  Vascular:  no phlebitis                              13.1   4.94  )-----------( 244      ( 04 Apr 2019 06:13 )             40.8 04-04    140  |  108  |  7   ----------------------------<  86  3.9   |  21  |  1.08  Ca    9.6      04 Apr 2019 06:13Phos  3.2     04-04Mg     1.8     04-04  TPro  7.9  /  Alb  4.2  /  TBili  0.6  /  DBili  x   /  AST  23  /  ALT  16  /  AlkPhos  66  04-02    Magnesium, Serum: 1.8 mg/dL (04.04.19 @ 06:13)  Phosphorus Level, Serum: 3.2 mg/dL (04.04.19 @ 06:13)    Urinalysis Basic - ( 03 Apr 2019 13:16 )  Color: YELLOW / Appearance: TURBID / SG: > 1.040 / pH: 7.0  Gluc: NEGATIVE / Ketone: NEGATIVE  / Bili: NEGATIVE / Urobili: 1.0   Blood: SMALL / Protein: 100 / Nitrite: NEGATIVE   Leuk Esterase: LARGE / RBC: 6-10 / WBC >50   Sq Epi: FEW / Non Sq Epi: x / Bacteria: FEW    MICROBIOLOGY:  3/1/19 - JX2=844 (76)  3/1/19 - VL D<30    2/4/19 - SQ6=131 (8)  2/4/19 -     12/13/18 - VL [id number]  12/7/18 - CD4=50 (3)    RADIOLOGY:  n/a
CHIEF COMPLAINT: Patient is a 23y old  female who presents with a chief complaint of refractory genital herpes (04 Apr 2019 11:09)      SUBJECTIVE / OVERNIGHT EVENTS:    Continues to have perineal pain. Denies F/C. Appetite poor. Denies constipation - reports normally has BM's 1-2x per week.    MEDICATIONS  (STANDING):  acetaminophen  IVPB .. 1000 milliGRAM(s) IV Intermittent once  atazanavir 300 milliGRAM(s) Oral daily  azithromycin   Tablet 1200 milliGRAM(s) Oral <User Schedule>  emtricitabine 200 mG/tenofovir alafenamide 25 mG (DESCOVY) Tablet 1 Tablet(s) Oral daily  enoxaparin Injectable 40 milliGRAM(s) SubCutaneous daily  foscarnet (Peripheral) IVPB 2000 milliGRAM(s) IV Intermittent every 8 hours  lidocaine 5% Ointment 1 Application(s) Topical three times a day  naproxen 500 milliGRAM(s) Oral every 12 hours  ritonavir Tablet 100 milliGRAM(s) Oral daily  trimethoprim  160 mG/sulfamethoxazole 800 mG 1 Tablet(s) Oral <User Schedule>    MEDICATIONS  (PRN):      VITALS:  T(F): 98.4 (04-04-19 @ 09:55), Max: 98.8 (04-04-19 @ 06:44)  HR: 70 (04-04-19 @ 09:55) (63 - 78)  BP: 128/89 (04-04-19 @ 09:55) (126/79 - 144/74)  RR: 18 (04-04-19 @ 09:55) (16 - 18)  SpO2: 98% (04-04-19 @ 09:55)      CAPILLARY BLOOD GLUCOSE    Output     I&O's Summary  T(F): 98.4 (04-04-19 @ 09:55), Max: 98.8 (04-04-19 @ 06:44)  HR: 70 (04-04-19 @ 09:55) (63 - 78)  BP: 128/89 (04-04-19 @ 09:55) (126/79 - 144/74)  RR: 18 (04-04-19 @ 09:55) (16 - 18)  SpO2: 98% (04-04-19 @ 09:55)    PHYSICAL EXAM:  GENERAL: NAD, well-developed  HEAD:  Atraumatic, Normocephalic  EYES: EOMI  NECK: Supple, No JVD  CHEST/LUNG: nonlabored breathing  HEART: nl S1/S2  ABDOMEN: nondistended, soft  EXTREMITIES:  no LE edema  PSYCH: alert, answering questions appropriately  NEUROLOGY: non-focal  SKIN: No rashes noted    LABS:              13.1                 140  | 21   | 7            4.94  >-----------< 244     ------------------------< 86                    40.8                 3.9  | 108  | 1.08                                         Ca 9.6   Mg 1.8   Ph 3.2         TPro  7.9  /  Alb  4.2      TBili  0.6  /  DBili  x         AST  23  /  ALT  16            AlkPhos  66            Urinalysis Basic - ( 03 Apr 2019 13:16 )    Color: YELLOW / Appearance: TURBID / SG: > 1.040 / pH: 7.0  Gluc: NEGATIVE / Ketone: NEGATIVE  / Bili: NEGATIVE / Urobili: 1.0   Blood: SMALL / Protein: 100 / Nitrite: NEGATIVE   Leuk Esterase: LARGE / RBC: 6-10 / WBC >50   Sq Epi: FEW / Non Sq Epi: x / Bacteria: FEW            MICROBIOLOGY:        RADIOLOGY & ADDITIONAL TESTS:    Imaging Personally Reviewed:          [x] Care Discussed with Consultants/Other Providers:      Christ Real M.D.  Hospitalist  Pager [phone]
CHIEF COMPLAINT: Patient is a 23y old  female who presents with a chief complaint of refractory genital herpes (05 Apr 2019 13:13)      SUBJECTIVE / OVERNIGHT EVENTS:    Lesions improving. Pain lessening. Denies other complaints. Discussed CD4 count improved.    MEDICATIONS  (STANDING):  acetaminophen  IVPB .. 1000 milliGRAM(s) IV Intermittent once  atazanavir 300 milliGRAM(s) Oral daily  azithromycin   Tablet 1200 milliGRAM(s) Oral <User Schedule>  emtricitabine 200 mG/tenofovir alafenamide 25 mG (DESCOVY) Tablet 1 Tablet(s) Oral daily  enoxaparin Injectable 40 milliGRAM(s) SubCutaneous daily  foscarnet (Peripheral) IVPB 2000 milliGRAM(s) IV Intermittent every 8 hours  lidocaine 5% Ointment 1 Application(s) Topical three times a day  naproxen 500 milliGRAM(s) Oral every 12 hours  ritonavir Tablet 100 milliGRAM(s) Oral daily  trimethoprim  160 mG/sulfamethoxazole 800 mG 1 Tablet(s) Oral <User Schedule>    MEDICATIONS  (PRN):      VITALS:  T(F): 99 (04-05-19 @ 14:41), Max: 99 (04-05-19 @ 14:41)  HR: 60 (04-05-19 @ 14:41) (58 - 76)  BP: 126/88 (04-05-19 @ 14:41) (126/88 - 130/84)  RR: 16 (04-05-19 @ 14:41) (16 - 18)  SpO2: 100% (04-05-19 @ 14:41)      CAPILLARY BLOOD GLUCOSE    Output     I&O's Summary  T(F): 99 (04-05-19 @ 14:41), Max: 99 (04-05-19 @ 14:41)  HR: 60 (04-05-19 @ 14:41) (58 - 76)  BP: 126/88 (04-05-19 @ 14:41) (126/88 - 130/84)  RR: 16 (04-05-19 @ 14:41) (16 - 18)  SpO2: 100% (04-05-19 @ 14:41)    PHYSICAL EXAM:  GENERAL: NAD, well-developed  HEAD:  Atraumatic, Normocephalic  EYES: EOMI  NECK: Supple, No JVD  CHEST/LUNG: nonlabored breathing  HEART: nl S1/S2  ABDOMEN: nondistended, soft  EXTREMITIES:  no LE edema  PSYCH: alert, answering questions appropriately  NEUROLOGY: non-focal  SKIN: No rashes noted    LABS:              12.8                 142  | 23   | 7            4.87  >-----------< 218     ------------------------< 84                    39.8                 3.8  | 108  | 0.95                                         Ca 10.1  Mg 1.8   Ph 3.3                        MICROBIOLOGY:    Culture - Urine (collected 03 Apr 2019 15:15)  Source: URINE MIDSTREAM  Final Report (05 Apr 2019 08:15):    NO GROWTH AT 24 HOURS          RADIOLOGY & ADDITIONAL TESTS:    Imaging Personally Reviewed:          [x] Care Discussed with Consultants/Other Providers:      Christ Real M.D.  Hospitalist  Pager [phone]
CHIEF COMPLAINT: Patient is a 23y old  female who presents with a chief complaint of refractory genital herpes (06 Apr 2019 08:53)      SUBJECTIVE / OVERNIGHT EVENTS:    Pain much improved. Pain with urination resolved. Denies other complaints.    MEDICATIONS  (STANDING):  acetaminophen  IVPB .. 1000 milliGRAM(s) IV Intermittent once  atazanavir 300 milliGRAM(s) Oral daily  azithromycin   Tablet 1200 milliGRAM(s) Oral <User Schedule>  emtricitabine 200 mG/tenofovir alafenamide 25 mG (DESCOVY) Tablet 1 Tablet(s) Oral daily  enoxaparin Injectable 40 milliGRAM(s) SubCutaneous daily  foscarnet (Peripheral) IVPB 2000 milliGRAM(s) IV Intermittent every 8 hours  lidocaine 5% Ointment 1 Application(s) Topical three times a day  naproxen 500 milliGRAM(s) Oral every 12 hours  ritonavir Tablet 100 milliGRAM(s) Oral daily  sodium chloride 0.9%. 1000 milliLiter(s) (75 mL/Hr) IV Continuous <Continuous>  trimethoprim  160 mG/sulfamethoxazole 800 mG 1 Tablet(s) Oral <User Schedule>    MEDICATIONS  (PRN):      VITALS:  T(F): 97.8 (04-06-19 @ 09:52), Max: 99 (04-05-19 @ 14:41)  HR: 71 (04-06-19 @ 09:52) (60 - 71)  BP: 120/85 (04-06-19 @ 09:52) (120/85 - 134/91)  RR: 18 (04-06-19 @ 09:52) (16 - 19)  SpO2: 100% (04-06-19 @ 09:52)      CAPILLARY BLOOD GLUCOSE    Output     I&O's Summary  T(F): 97.8 (04-06-19 @ 09:52), Max: 99 (04-05-19 @ 14:41)  HR: 71 (04-06-19 @ 09:52) (60 - 71)  BP: 120/85 (04-06-19 @ 09:52) (120/85 - 134/91)  RR: 18 (04-06-19 @ 09:52) (16 - 19)  SpO2: 100% (04-06-19 @ 09:52)    PHYSICAL EXAM:  GENERAL: NAD, well-developed  HEAD:  Atraumatic, Normocephalic  EYES: EOMI  NECK: Supple, No JVD  CHEST/LUNG: nonlabored breathing  HEART: nl S1/S2  ABDOMEN: nondistended, soft  : bilateral labial lesions improved compared to picture from prior  EXTREMITIES:  no LE edema  PSYCH: alert, answering questions appropriately  NEUROLOGY: non-focal  SKIN: No rashes noted    LABS:              12.4                 141  | 21   | 9            4.53  >-----------< 210     ------------------------< 84                    39.3                 3.7  | 110  | 1.01                                         Ca 9.7   Mg 1.7   Ph 3.5                        MICROBIOLOGY:    Culture - Urine (collected 03 Apr 2019 15:15)  Source: URINE MIDSTREAM  Final Report (05 Apr 2019 08:15):    NO GROWTH AT 24 HOURS          RADIOLOGY & ADDITIONAL TESTS:    Imaging Personally Reviewed:          [x] Care Discussed with Consultants/Other Providers:      Christ Real M.D.  Hospitalist  Pager [phone]
CHIEF COMPLAINT: Patient is a 23y old  female who presents with a chief complaint of refractory genital herpes (07 Apr 2019 07:24)      SUBJECTIVE / OVERNIGHT EVENTS:    Pain improving. Genital irritation with urination also improved. Discussed case with ID Dr. Mccain - patient will need PICC for outpatient foscarnet. Patient denied other complaints - otherwise feeling well.    MEDICATIONS  (STANDING):  acetaminophen  IVPB .. 1000 milliGRAM(s) IV Intermittent once  atazanavir 300 milliGRAM(s) Oral daily  azithromycin   Tablet 1200 milliGRAM(s) Oral <User Schedule>  emtricitabine 200 mG/tenofovir alafenamide 25 mG (DESCOVY) Tablet 1 Tablet(s) Oral daily  enoxaparin Injectable 40 milliGRAM(s) SubCutaneous daily  foscarnet (Peripheral) IVPB 2000 milliGRAM(s) IV Intermittent every 8 hours  lidocaine 5% Ointment 1 Application(s) Topical three times a day  naproxen 500 milliGRAM(s) Oral every 12 hours  ritonavir Tablet 100 milliGRAM(s) Oral daily  sodium chloride 0.9%. 1000 milliLiter(s) (75 mL/Hr) IV Continuous <Continuous>  trimethoprim  160 mG/sulfamethoxazole 800 mG 1 Tablet(s) Oral <User Schedule>    MEDICATIONS  (PRN):      VITALS:  T(F): 98 (04-07-19 @ 09:41), Max: 98.4 (04-06-19 @ 21:23)  HR: 83 (04-07-19 @ 09:41) (61 - 86)  BP: 117/65 (04-07-19 @ 09:41) (117/65 - 141/83)  RR: 16 (04-07-19 @ 09:41) (16 - 20)  SpO2: 99% (04-07-19 @ 09:41)      CAPILLARY BLOOD GLUCOSE    Output     I&O's Summary  T(F): 98 (04-07-19 @ 09:41), Max: 98.4 (04-06-19 @ 21:23)  HR: 83 (04-07-19 @ 09:41) (61 - 86)  BP: 117/65 (04-07-19 @ 09:41) (117/65 - 141/83)  RR: 16 (04-07-19 @ 09:41) (16 - 20)  SpO2: 99% (04-07-19 @ 09:41)    PHYSICAL EXAM:  GENERAL: NAD, well-developed  HEAD:  Atraumatic, Normocephalic  EYES: EOMI  NECK: Supple, No JVD  CHEST/LUNG: nonlabored breathing  HEART: nl S1/S2  ABDOMEN: nondistended, soft  EXTREMITIES:  no LE edema  PSYCH: alert, answering questions appropriately  NEUROLOGY: non-focal  SKIN: No rashes noted    LABS:              13.1                 139  | 22   | 7            5.75  >-----------< 228     ------------------------< 82                    41.1                 3.7  | 107  | 0.85                                         Ca 10.2  Mg 1.6   Ph 3.7                        MICROBIOLOGY:        RADIOLOGY & ADDITIONAL TESTS:    Imaging Personally Reviewed:          [x] Care Discussed with Consultants/Other Providers:      Christ Real M.D.  Hospitalist  Pager [phone]
ID Coverage  Patient is a 23y old  Female who presents with a chief complaint of refractory genital herpes (06 Apr 2019 12:16)    Being followed by ID for HSV    Interval history:pain better  denies any other complaints   feels "dehydrated" though drinkinga s much fluids as she can   No acute events      ROS:  No cough,SOB,CP  No N/V/D./abd pain  No other complaints      Antimicrobials:    atazanavir 300 milliGRAM(s) Oral daily  azithromycin   Tablet 1200 milliGRAM(s) Oral <User Schedule>  emtricitabine 200 mG/tenofovir alafenamide 25 mG (DESCOVY) Tablet 1 Tablet(s) Oral daily  foscarnet (Peripheral) IVPB 2000 milliGRAM(s) IV Intermittent every 8 hours  ritonavir Tablet 100 milliGRAM(s) Oral daily  trimethoprim  160 mG/sulfamethoxazole 800 mG 1 Tablet(s) Oral <User Schedule>    Other medications reviewed    Vital Signs Last 24 Hrs  T(C): 36.6 (04-06-19 @ 14:58), Max: 37.2 (04-05-19 @ 17:58)  T(F): 97.8 (04-06-19 @ 14:58), Max: 99 (04-05-19 @ 17:58)  HR: 68 (04-06-19 @ 14:58) (61 - 71)  BP: 131/63 (04-06-19 @ 14:58) (120/85 - 134/91)  BP(mean): --  RR: 18 (04-06-19 @ 09:52) (18 - 19)  SpO2: 97% (04-06-19 @ 14:58) (97% - 100%)    Physical Exam:        HEENT PERRLA EOMI    No oral exudate or erythema    Chest Good AE,CTA    CVS RRR S1 S2 WNl No murmur or rub or gallop    Abd soft BS normal No tenderness no masses  declined genital visual exam-showed picture sof lesions instead -which appear slightly drier as compared to prior    IV site no erythema tenderness or discharge    CNS AAO X 3 no focal    Lab Data:                          12.4   4.53  )-----------( 210      ( 06 Apr 2019 05:20 )             39.3       04-06    141  |  110<H>  |  9   ----------------------------<  84  3.7   |  21<L>  |  1.01      Creatinine, Serum: 1.01 mg/dL (04-06-19 @ 05:20)  Creatinine, Serum: 0.95 mg/dL (04-05-19 @ 06:10)  Creatinine, Serum: 1.08 mg/dL (04-04-19 @ 06:13)  Creatinine, Serum: 0.79 mg/dL (04-02-19 @ 21:25)    Ca    9.7      06 Apr 2019 05:20  Phos  3.5     04-06  Mg     1.7     04-06          Culture - Urine (collected 03 Apr 2019 15:15)  Source: URINE MIDSTREAM  Final Report (05 Apr 2019 08:15):    NO GROWTH AT 24 HOURS
Patient is a 23y old  Female who presents with a chief complaint of refractory genital herpes (03 Apr 2019 09:38)    f/u herpes    Interval History/ROS:   PICC placed yesterday.  genital lesions improving.  no dysuria.  no n/v/d.  Remainder of ROS otherwise negative.    PAST MEDICAL & SURGICAL HISTORY:  Genital herpes  HIV (human immunodeficiency virus infection)    Allergies  No Known Allergies    ANTIMICROBIALS:    atazanavir 300 daily  azithromycin   Tablet 1200 <User Schedule>  emtricitabine 200 mG/tenofovir alafenamide 25 mG (DESCOVY) Tablet 1 daily  foscarnet (Peripheral) IVPB 2000 every 8 hours  ritonavir Tablet 100 daily  trimethoprim  160 mG/sulfamethoxazole 800 mG 1 <User Schedule>    MEDICATIONS  (STANDING):  acetaminophen  IVPB .. 1000 once  enoxaparin Injectable 40 daily  naproxen 500 every 12 hours    Vital Signs Last 24 Hrs  T(F): 98.7 (04-09-19 @ 09:58), Max: 98.8 (04-08-19 @ 21:10)  HR: 99 (04-09-19 @ 09:58)  BP: 137/77 (04-09-19 @ 09:58)  RR: 17 (04-09-19 @ 09:58)  SpO2: 100% (04-09-19 @ 09:58) (100% - 100%)    PHYSICAL EXAM:  General: non-toxic  HEAD/EYES: anicteric  ENT:  supple  Cardiovascular:   S1, S2  Respiratory:  clear bilaterally  GI:  soft, non-tender, normal bowel sounds  : genital ulcers no exudate; smaller  Musculoskeletal:  no synovitis  Neurologic:  grossly non-focal  Skin:  no rash elsewhere  Psychiatric:  appropriate affect  Vascular:  L PICC placed 4/8                                     12.8   6.70  )-----------( 215      ( 08 Apr 2019 06:19 )             40.6 04-09    141  |  104  |  8   ----------------------------<  79  3.6   |  25  |  0.91  Ca    10.6      09 Apr 2019 05:50Phos  3.6     04-09Mg     1.6     04-09    CD4 %: 7 (04-05-19 @ 06:10)  ABS CD4: 161 (04-05-19 @ 06:10)    Urinalysis Basic - ( 03 Apr 2019 13:16 )  Color: YELLOW / Appearance: TURBID / SG: > 1.040 / pH: 7.0  Gluc: NEGATIVE / Ketone: NEGATIVE  / Bili: NEGATIVE / Urobili: 1.0   Blood: SMALL / Protein: 100 / Nitrite: NEGATIVE   Leuk Esterase: LARGE / RBC: 6-10 / WBC >50   Sq Epi: FEW / Non Sq Epi: x / Bacteria: FEW    MICROBIOLOGY:  4/5/19 - GX6=105(7%)    3/1/19 - PN7=369 (76)  3/1/19 - VL D<30    2/4/19 - YM2=998 (8)  2/4/19 -     12/13/18 - VL [id number]  12/7/18 - CD4=50 (3)    RADIOLOGY:  n/a
Patient is a 23y old  Female who presents with a chief complaint of refractory genital herpes (03 Apr 2019 09:38)    f/u herpes    Interval History/ROS:  feeling much better.  no more pain.  some pruritis.  no weakness.  no n/v/d.  urinating okay. Remainder of ROS otherwise negative.  appreciate medicine consult note    PAST MEDICAL & SURGICAL HISTORY:  Genital herpes  HIV (human immunodeficiency virus infection)    Allergies  No Known Allergies    ANTIMICROBIALS:    atazanavir 300 daily  azithromycin   Tablet 1200 <User Schedule>  emtricitabine 200 mG/tenofovir alafenamide 25 mG (DESCOVY) Tablet 1 daily  foscarnet (Peripheral) IVPB 2000 every 8 hours  ritonavir Tablet 100 daily  trimethoprim  160 mG/sulfamethoxazole 800 mG 1 <User Schedule>    MEDICATIONS  (STANDING):  acetaminophen  IVPB .. 1000 once  enoxaparin Injectable 40 daily  naproxen 500 every 12 hours    Vital Signs Last 24 Hrs  T(F): 98 (04-08-19 @ 05:40), Max: 98.7 (04-08-19 @ 01:33)  HR: 63 (04-08-19 @ 05:40)  BP: 134/81 (04-08-19 @ 05:40)  RR: 18 (04-08-19 @ 05:40)  SpO2: 100% (04-08-19 @ 05:40) (99% - 100%)  Wt(kg): --    PHYSICAL EXAM:  General: non-toxic  HEAD/EYES: anicteric  ENT:  supple  Cardiovascular:   S1, S2  Respiratory:  clear bilaterally  GI:  soft, non-tender, normal bowel sounds  : genital ulcers no exudate; smaller  Musculoskeletal:  no synovitis  Neurologic:  grossly non-focal  Skin:  no rash elsewhere  Psychiatric:  appropriate affect  Vascular:  no phlebitis                            12.8   6.70  )-----------( 215      ( 08 Apr 2019 06:19 )             40.6 04-08    142  |  106  |  5   ----------------------------<  108  3.8   |  24  |  0.84  Ca    10.7      08 Apr 2019 06:19Phos  3.7     04-08Mg     1.5     04-08    CD4 %: 7 (04-05-19 @ 06:10)  ABS CD4: 161 (04-05-19 @ 06:10)    Urinalysis Basic - ( 03 Apr 2019 13:16 )  Color: YELLOW / Appearance: TURBID / SG: > 1.040 / pH: 7.0  Gluc: NEGATIVE / Ketone: NEGATIVE  / Bili: NEGATIVE / Urobili: 1.0   Blood: SMALL / Protein: 100 / Nitrite: NEGATIVE   Leuk Esterase: LARGE / RBC: 6-10 / WBC >50   Sq Epi: FEW / Non Sq Epi: x / Bacteria: FEW    MICROBIOLOGY:  4/5/19 - DR7=539(7%)    3/1/19 - VU3=005 (76)  3/1/19 - VL D<30    2/4/19 - WL2=266 (8)  2/4/19 -     12/13/18 - VL [id number]  12/7/18 - CD4=50 (3)    RADIOLOGY:  n/a
Patient is a 23y old  Female who presents with a chief complaint of refractory genital herpes (03 Apr 2019 09:38)    f/u herpes    Interval History/ROS:  no fever.  still with painful urination when urine touches the ulcers.  no diarrhea.  appetite is so-so.  but overall, she feels better and feels the lesions are improving.  Remainder of ROS otherwise negative.  appreciate medicine consult note    PAST MEDICAL & SURGICAL HISTORY:  Genital herpes  HIV (human immunodeficiency virus infection)    Allergies  No Known Allergies    ANTIMICROBIALS:    atazanavir 300 daily  azithromycin   Tablet 1200 <User Schedule>  emtricitabine 200 mG/tenofovir alafenamide 25 mG (DESCOVY) Tablet 1 daily  foscarnet (Peripheral) IVPB 2000 every 8 hours  ritonavir Tablet 100 daily  trimethoprim  160 mG/sulfamethoxazole 800 mG 1 <User Schedule>    MEDICATIONS  (STANDING):  acetaminophen  IVPB .. 1000 once  enoxaparin Injectable 40 daily  naproxen 500 every 12 hours    Vital Signs Last 24 Hrs  T(F): 98.4 (04-05-19 @ 10:41), Max: 98.5 (04-04-19 @ 22:02)  HR: 58 (04-05-19 @ 10:41)  BP: 128/72 (04-05-19 @ 10:41)  RR: 16 (04-05-19 @ 10:41)  SpO2: 100% (04-05-19 @ 10:41) (98% - 100%)  Wt(kg): --    PHYSICAL EXAM:  General: non-toxic  HEAD/EYES: anicteric  ENT:  supple  Cardiovascular:   S1, S2  Respiratory:  clear bilaterally  GI:  soft, non-tender, normal bowel sounds  :  2 labial ulcers; 1 large left labial ulcer - improving; no exudate  Musculoskeletal:  no synovitis  Neurologic:  grossly non-focal  Skin:  no rash elsewhere  Psychiatric:  appropriate affect  Vascular:  no phlebitis                                    12.8   4.87  )-----------( 218      ( 05 Apr 2019 06:10 )             39.8 04-05    142  |  108  |  7   ----------------------------<  84  3.8   |  23  |  0.95  Ca    10.1      05 Apr 2019 06:10Phos  3.3     04-05Mg     1.8     04-05    CD4 %: 7 (04-05-19 @ 06:10)  ABS CD4: 161 (04-05-19 @ 06:10)    Urinalysis Basic - ( 03 Apr 2019 13:16 )  Color: YELLOW / Appearance: TURBID / SG: > 1.040 / pH: 7.0  Gluc: NEGATIVE / Ketone: NEGATIVE  / Bili: NEGATIVE / Urobili: 1.0   Blood: SMALL / Protein: 100 / Nitrite: NEGATIVE   Leuk Esterase: LARGE / RBC: 6-10 / WBC >50   Sq Epi: FEW / Non Sq Epi: x / Bacteria: FEW    MICROBIOLOGY:  4/5/19 - OV4=658(7%)    3/1/19 - HX9=255 (76)  3/1/19 - VL D<30    2/4/19 - LF6=346 (8)  2/4/19 -     12/13/18 - VL [id number]  12/7/18 - CD4=50 (3)    RADIOLOGY:  n/a
Patient is a 23y old  Female who presents with a chief complaint of refractory genital herpes (09 Apr 2019 12:33)      SUBJECTIVE / OVERNIGHT EVENTS:  Feels well. Hoping to go home today    MEDICATIONS  (STANDING):  acetaminophen  IVPB .. 1000 milliGRAM(s) IV Intermittent once  atazanavir 300 milliGRAM(s) Oral daily  azithromycin   Tablet 1200 milliGRAM(s) Oral <User Schedule>  emtricitabine 200 mG/tenofovir alafenamide 25 mG (DESCOVY) Tablet 1 Tablet(s) Oral daily  enoxaparin Injectable 40 milliGRAM(s) SubCutaneous daily  foscarnet (Peripheral) IVPB 2000 milliGRAM(s) IV Intermittent every 8 hours  lidocaine 5% Ointment 1 Application(s) Topical three times a day  naproxen 500 milliGRAM(s) Oral every 12 hours  ritonavir Tablet 100 milliGRAM(s) Oral daily  sodium chloride 0.9%. 1000 milliLiter(s) (75 mL/Hr) IV Continuous <Continuous>  sodium chloride 0.9%. 1000 milliLiter(s) (75 mL/Hr) IV Continuous <Continuous>  trimethoprim  160 mG/sulfamethoxazole 800 mG 1 Tablet(s) Oral <User Schedule>    MEDICATIONS  (PRN):      Vital Signs Last 24 Hrs  T(C): 37.1 (09 Apr 2019 09:58), Max: 37.1 (08 Apr 2019 21:10)  T(F): 98.7 (09 Apr 2019 09:58), Max: 98.8 (08 Apr 2019 21:10)  HR: 99 (09 Apr 2019 09:58) (60 - 99)  BP: 137/77 (09 Apr 2019 09:58) (128/98 - 137/77)  BP(mean): --  RR: 17 (09 Apr 2019 09:58) (17 - 19)  SpO2: 100% (09 Apr 2019 09:58) (100% - 100%)  CAPILLARY BLOOD GLUCOSE        I&O's Summary    08 Apr 2019 07:01  -  09 Apr 2019 07:00  --------------------------------------------------------  IN: 1367 mL / OUT: 0 mL / NET: 1367 mL        PHYSICAL EXAM:  GENERAL: NAD, well-developed  HEENT: EOMI, conjunctiva clear  NECK: Supple, No JVD  CHEST/LUNG: CTA b/l  HEART: RRR, normal S1/S2  ABDOMEN: BS + nondistended, nontender, soft  EXTREMITIES:  no clubbing/cyanosis/edema  PSYCH: alert, answering questions appropriately  NEUROLOGY: non-focal      LABS:                        12.8   6.70  )-----------( 215      ( 08 Apr 2019 06:19 )             40.6     04-09    141  |  104  |  8   ----------------------------<  79  3.6   |  25  |  0.91    Ca    10.6<H>      09 Apr 2019 05:50  Phos  3.6     04-09  Mg     1.6     04-09
Patient is a 23y old  Female who presents with a chief complaint of refractory genital herpes (2019 10:13)      SUBJECTIVE / OVERNIGHT EVENTS:  Patient seen and examined with Frances FALCON NP.  No acute events  Reports rashes are improving but now mildly pruritis. No pain.  Menstrating currently      MEDICATIONS  (STANDING):  acetaminophen  IVPB .. 1000 milliGRAM(s) IV Intermittent once  atazanavir 300 milliGRAM(s) Oral daily  azithromycin   Tablet 1200 milliGRAM(s) Oral <User Schedule>  emtricitabine 200 mG/tenofovir alafenamide 25 mG (DESCOVY) Tablet 1 Tablet(s) Oral daily  enoxaparin Injectable 40 milliGRAM(s) SubCutaneous daily  foscarnet (Peripheral) IVPB 2000 milliGRAM(s) IV Intermittent every 8 hours  lidocaine 5% Ointment 1 Application(s) Topical three times a day  naproxen 500 milliGRAM(s) Oral every 12 hours  ritonavir Tablet 100 milliGRAM(s) Oral daily  sodium chloride 0.9%. 1000 milliLiter(s) (75 mL/Hr) IV Continuous <Continuous>  sodium chloride 0.9%. 1000 milliLiter(s) (75 mL/Hr) IV Continuous <Continuous>  trimethoprim  160 mG/sulfamethoxazole 800 mG 1 Tablet(s) Oral <User Schedule>    MEDICATIONS  (PRN):      Vital Signs Last 24 Hrs  T(C): 36.9 (2019 16:16), Max: 37.1 (2019 01:33)  T(F): 98.4 (2019 16:16), Max: 98.7 (2019 01:33)  HR: 60 (2019 16:16) (60 - 78)  BP: 129/83 (2019 16:16) (123/78 - 143/81)  BP(mean): --  RR: 19 (2019 16:16) (17 - 19)  SpO2: 100% (2019 16:16) (99% - 100%)  CAPILLARY BLOOD GLUCOSE        I&O's Summary    2019 07:01  -  2019 07:00  --------------------------------------------------------  IN: 334 mL / OUT: 0 mL / NET: 334 mL        PHYSICAL EXAM:  GENERAL: NAD, well-developed  HEENT: EOMI, conjunctiva clear  NECK: Supple, No JVD  CHEST/LUNG: CTA b/l  HEART: RRR, normal S1/S2  ABDOMEN: BS + nondistended, nontender, soft  /GYN: refused exam but had a picture from this am on the phone as well as comparator pictures from earlier in hospitalization. Lesions, R>L erthematous but now without purulence. Appear to be improving.  EXTREMITIES:  no clubbing/cyanosis/edema  PSYCH: alert, answering questions appropriately  NEUROLOGY: non-focal  SKIN: No rashes noted other than /GYN    LABS:                        12.8   6.70  )-----------( 215      ( 2019 06:19 )             40.6     04-08    142  |  106  |  5<L>  ----------------------------<  108<H>  3.8   |  24  |  0.84    Ca    10.7<H>      2019 06:19  Phos  3.7     04-08  Mg     1.5     04-08            Urinalysis Basic - ( 2019 14:24 )    Color: LIGHT YELLOW / Appearance: CLEAR / S.009 / pH: 6.0  Gluc: NEGATIVE / Ketone: NEGATIVE  / Bili: NEGATIVE / Urobili: NORMAL   Blood: MODERATE / Protein: NEGATIVE / Nitrite: NEGATIVE   Leuk Esterase: LARGE / RBC: 3-5 / WBC 26-50   Sq Epi: MODERATE / Non Sq Epi: x / Bacteria: NEGATIVE        Care Discussed with Consultants/Other Providers:  Dr. Chaves (ID)
Patient seen and examined at bedside, no acute overnight events. No acute complaints, pain improving.  Patient is ambulating, voiding spontaneously, and tolerating regular diet. Denies CP, SOB, N/V, fevers, and chills.    Vital Signs Last 24 Hours  T(C): 36.9 (04-04-19 @ 09:55), Max: 37.1 (04-04-19 @ 06:44)  HR: 70 (04-04-19 @ 09:55) (63 - 78)  BP: 128/89 (04-04-19 @ 09:55) (126/79 - 144/74)  RR: 18 (04-04-19 @ 09:55) (16 - 18)  SpO2: 98% (04-04-19 @ 09:55) (97% - 100%)    I&O's Summary    03 Apr 2019 07:01  -  04 Apr 2019 07:00  --------------------------------------------------------  IN: 332 mL / OUT: 900 mL / NET: -568 mL    04 Apr 2019 07:01  -  04 Apr 2019 15:49  --------------------------------------------------------  IN: 0 mL / OUT: 400 mL / NET: -400 mL        Physical Exam:  General: NAD  CV: NR, RR, S1, S2, no M/R/G  Lungs: CTA-B  Abdomen: Soft, non-tender, non-distended, +BS  Ext: No pain or swelling    Labs:                        13.1   4.94  )-----------( 244      ( 04 Apr 2019 06:13 )             40.8   baso x      eos x      imm gran x      lymph x      mono x      poly x                            13.0   6.15  )-----------( 246      ( 02 Apr 2019 21:25 )             41.7   baso 0.5    eos 6.7    imm gran 0.2    lymph 47.2   mono 8.6    poly 36.8       MEDICATIONS  (STANDING):  acetaminophen  IVPB .. 1000 milliGRAM(s) IV Intermittent once  atazanavir 300 milliGRAM(s) Oral daily  azithromycin   Tablet 1200 milliGRAM(s) Oral <User Schedule>  emtricitabine 200 mG/tenofovir alafenamide 25 mG (DESCOVY) Tablet 1 Tablet(s) Oral daily  enoxaparin Injectable 40 milliGRAM(s) SubCutaneous daily  foscarnet (Peripheral) IVPB 2000 milliGRAM(s) IV Intermittent every 8 hours  lidocaine 5% Ointment 1 Application(s) Topical three times a day  naproxen 500 milliGRAM(s) Oral every 12 hours  ritonavir Tablet 100 milliGRAM(s) Oral daily  trimethoprim  160 mG/sulfamethoxazole 800 mG 1 Tablet(s) Oral <User Schedule>    MEDICATIONS  (PRN):
R1 GYN Progress Note    POD#   HD#    Patient seen and examined at bedside.  No acute events overnight. No acute complaints.  Pain well controlled.  Patient is ambulating and tolerating PO  Patient is passing flatus.    Patient is voiding spontaneously  Denies CP, SOB, N/V, fevers, and chills. PICC line was placed. The patient was going to home yesterday; however, home IV care could not be set up until today. Patient's pain has significantly improved and is ready to go home.    Vital Signs Last 24 Hours  T(C): 37.1 (04-09-19 @ 06:09), Max: 37.1 (04-08-19 @ 21:10)  HR: 68 (04-09-19 @ 06:09) (60 - 78)  BP: 130/76 (04-09-19 @ 06:09) (128/98 - 139/81)  RR: 17 (04-09-19 @ 06:09) (17 - 19)  SpO2: 100% (04-09-19 @ 06:09) (100% - 100%)    I&O's Summary    07 Apr 2019 07:01  -  08 Apr 2019 07:00  --------------------------------------------------------  IN: 334 mL / OUT: 0 mL / NET: 334 mL    08 Apr 2019 07:01  -  09 Apr 2019 06:44  --------------------------------------------------------  IN: 1367 mL / OUT: 0 mL / NET: 1367 mL        Physical Exam:  General: NAD  Exam deferred by patient    Labs:                        12.8   6.70  )-----------( 215      ( 08 Apr 2019 06:19 )             40.6   baso x      eos x      imm gran x      lymph x      mono x      poly x                            13.1   5.75  )-----------( 228      ( 07 Apr 2019 05:56 )             41.1   baso x      eos x      imm gran x      lymph x      mono x      poly x          MEDICATIONS  (STANDING):  acetaminophen  IVPB .. 1000 milliGRAM(s) IV Intermittent once  atazanavir 300 milliGRAM(s) Oral daily  azithromycin   Tablet 1200 milliGRAM(s) Oral <User Schedule>  emtricitabine 200 mG/tenofovir alafenamide 25 mG (DESCOVY) Tablet 1 Tablet(s) Oral daily  enoxaparin Injectable 40 milliGRAM(s) SubCutaneous daily  foscarnet (Peripheral) IVPB 2000 milliGRAM(s) IV Intermittent every 8 hours  lidocaine 5% Ointment 1 Application(s) Topical three times a day  naproxen 500 milliGRAM(s) Oral every 12 hours  ritonavir Tablet 100 milliGRAM(s) Oral daily  sodium chloride 0.9%. 1000 milliLiter(s) (75 mL/Hr) IV Continuous <Continuous>  sodium chloride 0.9%. 1000 milliLiter(s) (75 mL/Hr) IV Continuous <Continuous>  trimethoprim  160 mG/sulfamethoxazole 800 mG 1 Tablet(s) Oral <User Schedule>    MEDICATIONS  (PRN):
R1 GYN Progress Note  HD3    Patient seen and examined at bedside.  No acute events overnight. No acute complaints.  Pain better controlled than yesterday  Patient is ambulating and tolerating PO  Patient is passing flatus.    Patient is voiding spontanously   Denies CP, SOB, N/V, fevers, and chills. Pt endorses episode of SOB last night 2/2 pain; however, it has since resolved. Pt says that the lesions themselves have been getting better.    Vital Signs Last 24 Hours  T(C): 36.9 (04-05-19 @ 05:52), Max: 37.1 (04-04-19 @ 06:44)  HR: 61 (04-05-19 @ 05:52) (61 - 88)  BP: 130/84 (04-05-19 @ 05:52) (126/79 - 135/92)  RR: 16 (04-05-19 @ 05:52) (16 - 18)  SpO2: 100% (04-05-19 @ 05:52) (98% - 100%)    I&O's Summary    03 Apr 2019 07:01  -  04 Apr 2019 07:00  --------------------------------------------------------  IN: 332 mL / OUT: 900 mL / NET: -568 mL    04 Apr 2019 07:01  -  05 Apr 2019 06:43  --------------------------------------------------------  IN: 200 mL / OUT: 400 mL / NET: -200 mL        Physical Exam:  General: NAD  CV: RR, S1, S2, no M/R/G  Lungs: CTA b/l, good air flow b/l   Ext: No pain or swelling     Labs:                        13.1   4.94  )-----------( 244      ( 04 Apr 2019 06:13 )             40.8   baso x      eos x      imm gran x      lymph x      mono x      poly x                            13.0   6.15  )-----------( 246      ( 02 Apr 2019 21:25 )             41.7   baso 0.5    eos 6.7    imm gran 0.2    lymph 47.2   mono 8.6    poly 36.8       MEDICATIONS  (STANDING):  acetaminophen  IVPB .. 1000 milliGRAM(s) IV Intermittent once  atazanavir 300 milliGRAM(s) Oral daily  azithromycin   Tablet 1200 milliGRAM(s) Oral <User Schedule>  emtricitabine 200 mG/tenofovir alafenamide 25 mG (DESCOVY) Tablet 1 Tablet(s) Oral daily  enoxaparin Injectable 40 milliGRAM(s) SubCutaneous daily  foscarnet (Peripheral) IVPB 2000 milliGRAM(s) IV Intermittent every 8 hours  lidocaine 5% Ointment 1 Application(s) Topical three times a day  naproxen 500 milliGRAM(s) Oral every 12 hours  ritonavir Tablet 100 milliGRAM(s) Oral daily  trimethoprim  160 mG/sulfamethoxazole 800 mG 1 Tablet(s) Oral <User Schedule>    MEDICATIONS  (PRN):
R2 GYN NOTE  HD#5    Patient seen and examined at bedside, no acute overnight events. No acute complaints, pain well controlled.  Patient is ambulating, passing flatus, voiding spontaneously, and tolerating regular diet. Denies CP, SOB, N/V, fevers, and chills.    Vital Signs Last 24 Hours  T(C): 36.7 (04-07-19 @ 05:27), Max: 36.9 (04-06-19 @ 21:23)  HR: 86 (04-07-19 @ 05:27) (61 - 86)  BP: 133/83 (04-07-19 @ 05:27) (120/85 - 141/83)  RR: 16 (04-07-19 @ 05:27) (16 - 20)  SpO2: 98% (04-07-19 @ 05:27) (97% - 100%)    I&O's Summary    06 Apr 2019 07:01  -  07 Apr 2019 07:00  --------------------------------------------------------  IN: 1398 mL / OUT: 0 mL / NET: 1398 mL        Physical Exam:  General: NAD  CV: NR, RR, S1, S2, no M/R/G  Lungs: CTA-B  Abdomen: Soft, non-tender, non-distended, +BS  Ext: No pain or swelling    Labs:                        13.1   5.75  )-----------( 228      ( 07 Apr 2019 05:56 )             41.1   baso x      eos x      imm gran x      lymph x      mono x      poly x                            12.4   4.53  )-----------( 210      ( 06 Apr 2019 05:20 )             39.3   baso x      eos x      imm gran x      lymph x      mono x      poly x                            12.8   4.87  )-----------( 218      ( 05 Apr 2019 06:10 )             39.8   baso x      eos x      imm gran x      lymph x      mono x      poly x        04-07    139  |  107  |  7   ----------------------------<  82  3.7   |  22  |  0.85    Ca    10.2      07 Apr 2019 05:56  Phos  3.7     04-07  Mg     1.6     04-07                MEDICATIONS  (STANDING):  acetaminophen  IVPB .. 1000 milliGRAM(s) IV Intermittent once  atazanavir 300 milliGRAM(s) Oral daily  azithromycin   Tablet 1200 milliGRAM(s) Oral <User Schedule>  emtricitabine 200 mG/tenofovir alafenamide 25 mG (DESCOVY) Tablet 1 Tablet(s) Oral daily  enoxaparin Injectable 40 milliGRAM(s) SubCutaneous daily  foscarnet (Peripheral) IVPB 2000 milliGRAM(s) IV Intermittent every 8 hours  lidocaine 5% Ointment 1 Application(s) Topical three times a day  naproxen 500 milliGRAM(s) Oral every 12 hours  ritonavir Tablet 100 milliGRAM(s) Oral daily  sodium chloride 0.9%. 1000 milliLiter(s) (75 mL/Hr) IV Continuous <Continuous>  trimethoprim  160 mG/sulfamethoxazole 800 mG 1 Tablet(s) Oral <User Schedule>    MEDICATIONS  (PRN):
R2 GYN ONC NOTE   HD#4    Patient seen and examined at bedside, no acute overnight events. No acute complaints, pain is better controlled today. She states that she believes one of her lesions has improved.   Patient is ambulating, passing flatus, voiding spontaneously, and tolerating regular diet. Denies CP, SOB, N/V, fevers, and chills.    Vital Signs Last 24 Hours  T(C): 36.8 (04-06-19 @ 05:32), Max: 37.2 (04-05-19 @ 14:41)  HR: 70 (04-06-19 @ 05:32) (58 - 70)  BP: 133/75 (04-06-19 @ 05:32) (126/88 - 134/91)  RR: 18 (04-06-19 @ 05:32) (16 - 19)  SpO2: 100% (04-06-19 @ 05:32) (100% - 100%)    I&O's Summary    05 Apr 2019 07:01  -  06 Apr 2019 07:00  --------------------------------------------------------  IN: 501 mL / OUT: 0 mL / NET: 501 mL        Physical Exam:  General: NAD  CV: RR, S1, S2, no M/R/G  Lungs: CTA b/l, good air flow b/l   Ext: No pain or swelling       Labs:                        12.4   4.53  )-----------( 210      ( 06 Apr 2019 05:20 )             39.3   baso x      eos x      imm gran x      lymph x      mono x      poly x                            12.8   4.87  )-----------( 218      ( 05 Apr 2019 06:10 )             39.8   baso x      eos x      imm gran x      lymph x      mono x      poly x                            13.1   4.94  )-----------( 244      ( 04 Apr 2019 06:13 )             40.8   baso x      eos x      imm gran x      lymph x      mono x      poly x        04-06    141  |  110<H>  |  9   ----------------------------<  84  3.7   |  21<L>  |  1.01    Ca    9.7      06 Apr 2019 05:20  Phos  3.5     04-06  Mg     1.7     04-06                MEDICATIONS  (STANDING):  acetaminophen  IVPB .. 1000 milliGRAM(s) IV Intermittent once  atazanavir 300 milliGRAM(s) Oral daily  azithromycin   Tablet 1200 milliGRAM(s) Oral <User Schedule>  emtricitabine 200 mG/tenofovir alafenamide 25 mG (DESCOVY) Tablet 1 Tablet(s) Oral daily  enoxaparin Injectable 40 milliGRAM(s) SubCutaneous daily  foscarnet (Peripheral) IVPB 2000 milliGRAM(s) IV Intermittent every 8 hours  lidocaine 5% Ointment 1 Application(s) Topical three times a day  naproxen 500 milliGRAM(s) Oral every 12 hours  ritonavir Tablet 100 milliGRAM(s) Oral daily  trimethoprim  160 mG/sulfamethoxazole 800 mG 1 Tablet(s) Oral <User Schedule>    MEDICATIONS  (PRN):
R2 GYN PROGRESS NOTE    HD#1    SUBJECTIVE:  Patient admitted overnight for severe outbreak of HSV refractory to outpatient treatment.      Patient is 24yo  HIV+ (reported CD4 129, noncompliant with ART) reports she was first diagnosed with HSV after an outbreak in Dec 2018.  At that time she was started on valacyclovir 500mg bid and the outbreak cleared.  She continued on valacyclovir 500mg qd for suppression.  Then in early 2019 she had another outbreak.  She saw her pvt GYN (Shireen Rosenthal)  who told her to continue valacyclovir bid.  The outbreak did not clear and she saw her pvt immunologist (Dr. Hoover) in mid february who switched her to famcyclovir 250mg TID.  The outbreak persisted and on  she presented to [facility] ED and was diagnosed w/ superinfection and started on keflex.  She took the 7d course of abx but the outbreak did not improve so she returned to [facility] ED on Mar 14, bacterial culture at that time was negative and she was told to continue f/u with Dr. Hoover.  She saw Dr. Hoover on Mar 27 and culture of the lesions showed HSV+ again and resistance testing is pending.  On  she called Dr. Hoover to report that the lesions had still not improved and he sent her in to ED for IV antiviral tx.    This morning, pt report pain is ok when she is still and uses 5% lidocaine cream.  She requests kalee-bottle for toiletting.  She is able to void but it is painful.  At home she was using lidocaine cream and naproxen for pain, but she says the naproxen doesn't help much.  Patient is able to tolerate PO w/o N/V.  Denies CP, SOB, fevers, and chills.    OBJECTIVE:  Vital Signs Last 24 Hours  T(C): 36.9 (19 @ 04:23), Max: 36.9 (19 @ 04:23)  HR: 61 (19 @ 04:23) (61 - 75)  BP: 128/75 (19 @ 04:23) (128/75 - 151/90)  RR: 18 (19 @ 04:23) (16 - 18)  SpO2: 100% (19 @ 04:23) (100% - 100%)    I&O's Summary    Physical Exam:  General: NAD  CV: RRR  Lungs: CTAB  Abdomen: Soft, non-tender, non-distended, +BS  : pt deferrs pelvic exam, however, images on her phone show multiple ulcerated lesions of the b/l labia extending up to the clitoris on the left side, with white bases and purulent/bloody discharge with surrounding edema  Ext: No pain or swelling    Labs:                        13.0   6.15  )-----------( 246      ( 2019 21:25 )             41.7   baso 0.5    eos 6.7    imm gran 0.2    lymph 47.2   mono 8.6    poly 36.8         139  |  104  |  13  ----------------------------<  82  3.7   |  21<L>  |  0.79    Ca    9.4      2019 21:25    TPro  7.9  /  Alb  4.2  /  TBili  0.6  /  DBili  x   /  AST  23  /  ALT  16  /  AlkPhos  66  04-02        MEDICATIONS  (STANDING):  acyclovir IVPB 340 milliGRAM(s) IV Intermittent every 8 hours  atazanavir 300 milliGRAM(s) Oral daily  azithromycin   Tablet 1200 milliGRAM(s) Oral <User Schedule>  emtricitabine 200 mG/tenofovir alafenamide 25 mG (DESCOVY) Tablet 1 Tablet(s) Oral daily  lidocaine 5% Ointment 1 Application(s) Topical three times a day  naproxen 500 milliGRAM(s) Oral every 12 hours  ritonavir Tablet 100 milliGRAM(s) Oral daily  trimethoprim  160 mG/sulfamethoxazole 800 mG 1 Tablet(s) Oral <User Schedule>    MEDICATIONS  (PRN):
R1 GYN Progress Note  HD 6    Patient seen and examined at bedside.  No acute events overnight. No acute complaints.  Pain well controlled.  Patient is ambulating and tolerating PO  Patient is passing flatus.    Patient is voiding spontanously and lesions have improved  Denies CP, SOB, N/V, fevers, and chills.    Vital Signs Last 24 Hours  T(C): 36.7 (04-08-19 @ 05:40), Max: 37.1 (04-08-19 @ 01:33)  HR: 63 (04-08-19 @ 05:40) (61 - 83)  BP: 134/81 (04-08-19 @ 05:40) (117/65 - 143/81)  RR: 18 (04-08-19 @ 05:40) (16 - 18)  SpO2: 100% (04-08-19 @ 05:40) (99% - 100%)    I&O's Summary    07 Apr 2019 07:01  -  08 Apr 2019 07:00  --------------------------------------------------------  IN: 334 mL / OUT: 0 mL / NET: 334 mL        Physical Exam:  General: NAD  The rest deferred by patient as she was eating and feeling significantly better    Labs:                        13.1   5.75  )-----------( 228      ( 07 Apr 2019 05:56 )             41.1   baso x      eos x      imm gran x      lymph x      mono x      poly x                            12.4   4.53  )-----------( 210      ( 06 Apr 2019 05:20 )             39.3   baso x      eos x      imm gran x      lymph x      mono x      poly x          MEDICATIONS  (STANDING):  acetaminophen  IVPB .. 1000 milliGRAM(s) IV Intermittent once  atazanavir 300 milliGRAM(s) Oral daily  azithromycin   Tablet 1200 milliGRAM(s) Oral <User Schedule>  emtricitabine 200 mG/tenofovir alafenamide 25 mG (DESCOVY) Tablet 1 Tablet(s) Oral daily  enoxaparin Injectable 40 milliGRAM(s) SubCutaneous daily  foscarnet (Peripheral) IVPB 2000 milliGRAM(s) IV Intermittent every 8 hours  lidocaine 5% Ointment 1 Application(s) Topical three times a day  naproxen 500 milliGRAM(s) Oral every 12 hours  ritonavir Tablet 100 milliGRAM(s) Oral daily  sodium chloride 0.9%. 1000 milliLiter(s) (75 mL/Hr) IV Continuous <Continuous>  trimethoprim  160 mG/sulfamethoxazole 800 mG 1 Tablet(s) Oral <User Schedule>    MEDICATIONS  (PRN):

## 2019-04-09 NOTE — PROGRESS NOTE ADULT - PROBLEM SELECTOR PLAN 1
Herpetic vulvovaginitis.  Plan: Neuro: IV TYlenol/ PO Pain meds  CV: Hemodynamically stable  Pulm: Saturating well on room air, encourage oob/amb  GI: Continue regular diet  : Voiding spontanously   Heme: c/w Lovenox for ppx  FEN: replete electrolytes prn   ID: Afebrile, continue IV Foscarnet  Endo: No active issues   Dispo: Continue routine care   -Appreciate Medicine, immuno, and ID care    Robert, pgy2
NEURO: continue lidocaine cream and po naproxen for pain; will consider gabapentin if pain not well controlled  CV: VSS, routine VS, H/H wnl  PULM: saturating well on RA  GI: continue reg diet, SLIV  : peribottle for toiletting, UA ordered  HEME: venodynes, ambulate as tolerated  ID: ID consult; will also contact pt's immunologist Dr. Hoover; afebrile; WBC normal; continue azithromycin, bactrim, ritonavir, reyetaz, and IV acyclovir  DISPO: continue routine inpt care    BRIDGETTE Alberto MD PGY2
Neuro: IV TYlenol/ PO Pain meds  CV: Hemodynamically stable  Pulm: Saturating well on room air, encourage oob/amb  GI: Continue regular diet  : Voiding spontanously   Heme: c/w Lovenox for ppx  FEN: LR@125.  replete electrolytes prn   ID: Afebrile, continue IV Foscarnet  Endo: No active issues   Dispo: Continue routine care   -Appreciate Medicine, immuno, and ID care    Jm Espinoza PGY-1
Neuro: IV Tylenol/ PO Pain meds  CV: Hemodynamically stable  Pulm: Saturating well on room air, encourage oob/amb  GI: Continue regular diet  : Voiding spontanously   Heme: c/w Lovenox for ppx  FEN: replete electrolytes prn   ID: Afebrile, continue IV Foscarnet  Endo: No active issues   Dispo: Continue routine care and excellent care per primary   -Appreciate Medicine, immuno, and ID care    Robert, pgy2
Neuro: PO pain meds.   CV: Hemodynamically stable  Pulm: Saturating well on room air, encourage oob/amb  GI: Continue regular diet  : Voiding spontanously   Heme: c/w Lovenox and SCDs for DVT ppx  FEN: replete electrolytes prn   ID: Afebrile, continue foscarnet IV. Continue ART therapy  Endo: No active issues   Dispo: Continue routine care    Jm Espinoza PGY-1
s/p PICC  IV foscarnet with IV fluids x 2 more weeks  Appreciate ID f/u
Neuro: PO pain meds.   CV: Hemodynamically stable  Pulm: Saturating well on room air, encourage oob/amb  GI: Continue regular diet  : Voiding spontanously   Heme: c/w HSQ and SCDs for DVT ppx  FEN: replete electrolytes prn   ID: Afebrile, continue Foscarnet - Appreciate ID recs/ Med recs  Endo: No active issues   Dispo: Continue routine care as per Medicine team    Jm Espinoza PGY-1
PICC today, then plan for IV foscarnet with IV fluids x 2 more weeks  d/w ID

## 2019-04-09 NOTE — PROGRESS NOTE ADULT - PROBLEM SELECTOR PLAN 3
d/c planning today.    d/w RN and ADS NP  Plan d/w patient  D/C time: 40 minutes
Hypomagnesemia - Replete with magnesium sulfate and monitor.

## 2019-04-09 NOTE — PROGRESS NOTE ADULT - PROBLEM SELECTOR PROBLEM 1
Herpetic vulvovaginitis

## 2019-04-09 NOTE — PROGRESS NOTE ADULT - PROVIDER SPECIALTY LIST ADULT
GYN
Hospitalist
Infectious Disease
GYN
Infectious Disease

## 2019-04-10 ENCOUNTER — RX RENEWAL (OUTPATIENT)
Age: 24
End: 2019-04-10

## 2019-04-10 LAB
CD3 CELLS # BLD: 1369 /UL
CD3 CELLS NFR BLD: 64 %
CD3+CD4+ CELLS # BLD: 129 /UL
CD3+CD4+ CELLS NFR BLD: 6 %
CD3+CD4+ CELLS/CD3+CD8+ CLL SPEC: 0.12 RATIO
CD3+CD8+ CELLS # SPEC: 1096 /UL
CD3+CD8+ CELLS NFR BLD: 52 %
HIV1 RNA # SERPL NAA+PROBE: ABNORMAL
HIV1 RNA # SERPL NAA+PROBE: ABNORMAL COPIES/ML
HSV 1+2 IGG SER IA-IMP: NEGATIVE
HSV 1+2 IGG SER IA-IMP: POSITIVE
HSV1 IGG SER QL: 0.13 INDEX
HSV1 IGM SER QL: NORMAL TITER
HSV2 AB FLD-ACNC: NORMAL TITER
HSV2 IGG SER QL: 15.3 INDEX
VIRAL LOAD INTERP: NORMAL
VIRAL LOAD LOG: ABNORMAL LG COP/ML

## 2019-04-18 ENCOUNTER — APPOINTMENT (OUTPATIENT)
Dept: INFECTIOUS DISEASE | Facility: CLINIC | Age: 24
End: 2019-04-18
Payer: COMMERCIAL

## 2019-04-18 VITALS
HEART RATE: 73 BPM | TEMPERATURE: 97.9 F | SYSTOLIC BLOOD PRESSURE: 135 MMHG | DIASTOLIC BLOOD PRESSURE: 87 MMHG | WEIGHT: 151 LBS | OXYGEN SATURATION: 100 %

## 2019-04-18 PROCEDURE — 99214 OFFICE O/P EST MOD 30 MIN: CPT

## 2019-04-24 ENCOUNTER — RX CHANGE (OUTPATIENT)
Age: 24
End: 2019-04-24

## 2019-04-24 ENCOUNTER — CLINICAL ADVICE (OUTPATIENT)
Age: 24
End: 2019-04-24

## 2019-04-26 ENCOUNTER — APPOINTMENT (OUTPATIENT)
Dept: INFECTIOUS DISEASE | Facility: CLINIC | Age: 24
End: 2019-04-26

## 2019-05-06 ENCOUNTER — CLINICAL ADVICE (OUTPATIENT)
Age: 24
End: 2019-05-06

## 2019-05-09 ENCOUNTER — APPOINTMENT (OUTPATIENT)
Dept: INFECTIOUS DISEASE | Facility: CLINIC | Age: 24
End: 2019-05-09
Payer: COMMERCIAL

## 2019-05-09 ENCOUNTER — RX RENEWAL (OUTPATIENT)
Age: 24
End: 2019-05-09

## 2019-05-09 VITALS
DIASTOLIC BLOOD PRESSURE: 92 MMHG | BODY MASS INDEX: 26.58 KG/M2 | OXYGEN SATURATION: 98 % | SYSTOLIC BLOOD PRESSURE: 140 MMHG | HEIGHT: 63 IN | WEIGHT: 150 LBS | TEMPERATURE: 98 F | HEART RATE: 90 BPM

## 2019-05-09 PROCEDURE — 99214 OFFICE O/P EST MOD 30 MIN: CPT

## 2019-05-13 ENCOUNTER — CLINICAL ADVICE (OUTPATIENT)
Age: 24
End: 2019-05-13

## 2019-05-13 ENCOUNTER — RX CHANGE (OUTPATIENT)
Age: 24
End: 2019-05-13

## 2019-05-13 NOTE — ASSESSMENT
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  No available resistance studies.  She is better, but very slowly.  For now, to continue IV foscarnet - at least another week.  Will try to get viroptic to be compounded for topical use.  To stop aldara.\par \par f/u in a week\par

## 2019-05-13 NOTE — HISTORY OF PRESENT ILLNESS
[FreeTextEntry1] : 23F with congenital HIV (most recent T-cell= and VL - previously non-adherent with ARV.  Admitted 4/3/19 secondary refractory genital HSV-2.  She was started on foscarnet IV (2000mg tid) with clinical improvement.  She was to be discharged on Foscarnet 2500mg bid on 4/8/19.  I last saw her 4/18.  Topical aldara was started - she has been on it intermittently.  She thinks that it is causing irritation.  Overall, the lesions are better.  Just very slowly.  There is no fever.  No nausea.  No vomiting.  Sometimes itchy.  Sometimes dysuria.\par \par No issues with infusion.  Taking ARV.  Rest of ROS is okay.\par \par PAST MEDICAL & SURGICAL HISTORY:\par Genital herpes\par HIV (human immunodeficiency virus infection)

## 2019-05-13 NOTE — PHYSICAL EXAM
[General Appearance - Well Nourished] : well nourished [Sclera] : the sclera and conjunctiva were normal [Edema] : there was no peripheral edema [No Palpable Adenopathy] : no palpable adenopathy [Musculoskeletal - Swelling] : no joint swelling [No Focal Deficits] : no focal deficits [Outer Ear] : the ears and nose were normal in appearance [Neck Appearance] : the appearance of the neck was normal [] : no respiratory distress [Heart Rate And Rhythm] : heart rate was normal and rhythm regular [Abdomen Soft] : soft [Affect] : the affect was normal [FreeTextEntry1] : as above; L PICC c/d/i

## 2019-05-13 NOTE — ADDENDUM
[FreeTextEntry1] : tried to find pharmacy to compound topical foscarnet or cidofovir - could not find anyone who would or the cost was prohibitive

## 2019-05-16 ENCOUNTER — CLINICAL ADVICE (OUTPATIENT)
Age: 24
End: 2019-05-16

## 2019-05-20 ENCOUNTER — APPOINTMENT (OUTPATIENT)
Dept: INFECTIOUS DISEASE | Facility: CLINIC | Age: 24
End: 2019-05-20
Payer: COMMERCIAL

## 2019-05-20 VITALS
TEMPERATURE: 100 F | WEIGHT: 146 LBS | OXYGEN SATURATION: 98 % | DIASTOLIC BLOOD PRESSURE: 82 MMHG | HEART RATE: 79 BPM | SYSTOLIC BLOOD PRESSURE: 127 MMHG

## 2019-05-20 DIAGNOSIS — Z92.89 PERSONAL HISTORY OF OTHER MEDICAL TREATMENT: ICD-10-CM

## 2019-05-20 PROCEDURE — 99214 OFFICE O/P EST MOD 30 MIN: CPT

## 2019-05-22 LAB
ALBUMIN SERPL ELPH-MCNC: 4.4 G/DL
ALP BLD-CCNC: 63 U/L
ALT SERPL-CCNC: 16 U/L
ANION GAP SERPL CALC-SCNC: 13 MMOL/L
AST SERPL-CCNC: 18 U/L
BASOPHILS # BLD AUTO: 0.01 K/UL
BASOPHILS NFR BLD AUTO: 0.3 %
BILIRUB SERPL-MCNC: 1.2 MG/DL
BUN SERPL-MCNC: 6 MG/DL
CALCIUM SERPL-MCNC: 9.6 MG/DL
CD3 CELLS # BLD: 1105 /UL
CD3 CELLS NFR BLD: 64 %
CD3+CD4+ CELLS # BLD: 215 /UL
CD3+CD4+ CELLS NFR BLD: 12 %
CD3+CD4+ CELLS/CD3+CD8+ CLL SPEC: 0.28 RATIO
CD3+CD8+ CELLS # SPEC: 779 /UL
CD3+CD8+ CELLS NFR BLD: 45 %
CHLORIDE SERPL-SCNC: 105 MMOL/L
CO2 SERPL-SCNC: 23 MMOL/L
CREAT SERPL-MCNC: 0.79 MG/DL
EOSINOPHIL # BLD AUTO: 0.08 K/UL
EOSINOPHIL NFR BLD AUTO: 2 %
GLUCOSE SERPL-MCNC: 76 MG/DL
HCT VFR BLD CALC: 37.6 %
HGB BLD-MCNC: 12.3 G/DL
HIV1 RNA # SERPL NAA+PROBE: ABNORMAL
HIV1 RNA # SERPL NAA+PROBE: ABNORMAL COPIES/ML
IMM GRANULOCYTES NFR BLD AUTO: 0 %
LYMPHOCYTES # BLD AUTO: 1.86 K/UL
LYMPHOCYTES NFR BLD AUTO: 46.9 %
MAN DIFF?: NORMAL
MCHC RBC-ENTMCNC: 28.1 PG
MCHC RBC-ENTMCNC: 32.7 GM/DL
MCV RBC AUTO: 86 FL
MONOCYTES # BLD AUTO: 0.39 K/UL
MONOCYTES NFR BLD AUTO: 9.8 %
NEUTROPHILS # BLD AUTO: 1.63 K/UL
NEUTROPHILS NFR BLD AUTO: 41 %
PLATELET # BLD AUTO: 251 K/UL
POTASSIUM SERPL-SCNC: 4.1 MMOL/L
PROT SERPL-MCNC: 7.5 G/DL
RBC # BLD: 4.37 M/UL
RBC # FLD: 14.2 %
SODIUM SERPL-SCNC: 141 MMOL/L
VIRAL LOAD INTERP: NORMAL
VIRAL LOAD LOG: ABNORMAL LG COP/ML
WBC # FLD AUTO: 3.97 K/UL

## 2019-06-04 ENCOUNTER — APPOINTMENT (OUTPATIENT)
Dept: INFECTIOUS DISEASE | Facility: CLINIC | Age: 24
End: 2019-06-04
Payer: COMMERCIAL

## 2019-06-04 VITALS
HEIGHT: 63 IN | BODY MASS INDEX: 25.87 KG/M2 | HEART RATE: 74 BPM | SYSTOLIC BLOOD PRESSURE: 125 MMHG | OXYGEN SATURATION: 99 % | RESPIRATION RATE: 14 BRPM | DIASTOLIC BLOOD PRESSURE: 85 MMHG | TEMPERATURE: 98.8 F | WEIGHT: 146 LBS

## 2019-06-04 PROCEDURE — 99215 OFFICE O/P EST HI 40 MIN: CPT

## 2019-06-04 RX ORDER — MUPIROCIN 20 MG/G
2 OINTMENT TOPICAL
Qty: 22 | Refills: 0 | Status: DISCONTINUED | COMMUNITY
Start: 2019-05-24

## 2019-06-04 RX ORDER — ACYCLOVIR 50 MG/G
5 OINTMENT TOPICAL
Qty: 15 | Refills: 0 | Status: DISCONTINUED | COMMUNITY
Start: 2019-02-12

## 2019-06-04 NOTE — PHYSICAL EXAM
[Sclera] : the sclera and conjunctiva were normal [General Appearance - Well-Appearing] : healthy appearing [Oropharynx] : the oropharynx was normal with no thrush [Auscultation Breath Sounds / Voice Sounds] : lungs were clear to auscultation bilaterally [Neck Appearance] : the appearance of the neck was normal [Heart Sounds] : normal S1 and S2 [Costovertebral Angle Tenderness] : no CVA tenderness [Edema] : there was no peripheral edema [No Palpable Adenopathy] : no palpable adenopathy [Musculoskeletal - Swelling] : no joint swelling [No Focal Deficits] : no focal deficits [Affect] : the affect was normal [FreeTextEntry1] : slightly larger R labial ulcer with exudate; L labial ulcer stable

## 2019-06-04 NOTE — HISTORY OF PRESENT ILLNESS
[FreeTextEntry1] : 23F with congenital HIV (most recent T-cell=215(12%); VL D<30).  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  To date, she has been on 7 weeks foscarnet IV which was stopped last visit - 5/20/19.  She has been on topical trifluridine since then.  She does not feel that the ulcers have continued to improve.  She cannot tolerate the aldara as it is too irritating.  Feels the ulcer on the right is bigger (off foscarnet for 2 weeks now).  No fever/chills.  No dysuria.\par \par Rest of ROS is okay.\par \par PAST MEDICAL & SURGICAL HISTORY:\par Genital herpes\par HIV (human immunodeficiency virus infection)

## 2019-06-04 NOTE — ASSESSMENT
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  No available resistance studies.  s/p 7 weeks of IV foscarnet.  HIV getting under control now - undetectable since March 2019.   By now, the HSV should have been much better, however, it has not changed much in the last several weeks on or off foscarnet.  It is possible that this is something more than just HSV.  Cannot rule out cancer, etc.  Possible superimposed candidal infection given the exudates.  Plan to empirically treat for candida with fluconazole.  Will restart foscarnet - induction with 60mg/kg q8H (3.6gm) for 2 weeks then drop down to 90mg/kg daily (5.4g) for several weeks pending resolution.  Meanwhile, I have asked that she sees dermatology for a biopsy for pathology, fungal, afb, bacterial culture.  Additionally, I have cultured the ulcers and to evaluate for resistance testing which was not previously done.  f/u 3-4 weeks. PICC to be scheduled once approval for home infusion obtained.  Biweekly labs.\par \par \par \par

## 2019-06-05 ENCOUNTER — FORM ENCOUNTER (OUTPATIENT)
Age: 24
End: 2019-06-05

## 2019-06-05 LAB
ANION GAP SERPL CALC-SCNC: 11 MMOL/L
APTT BLD: 36.4 SEC
BASOPHILS # BLD AUTO: 0.02 K/UL
BASOPHILS NFR BLD AUTO: 0.3 %
BUN SERPL-MCNC: 7 MG/DL
CALCIUM SERPL-MCNC: 9.3 MG/DL
CHLORIDE SERPL-SCNC: 106 MMOL/L
CO2 SERPL-SCNC: 25 MMOL/L
CREAT SERPL-MCNC: 0.77 MG/DL
EOSINOPHIL # BLD AUTO: 0.32 K/UL
EOSINOPHIL NFR BLD AUTO: 5.6 %
GLUCOSE SERPL-MCNC: 89 MG/DL
HCT VFR BLD CALC: 40.6 %
HGB BLD-MCNC: 13 G/DL
IMM GRANULOCYTES NFR BLD AUTO: 0.3 %
INR PPP: 1.15 RATIO
LYMPHOCYTES # BLD AUTO: 2.27 K/UL
LYMPHOCYTES NFR BLD AUTO: 39.6 %
MAN DIFF?: NORMAL
MCHC RBC-ENTMCNC: 28.1 PG
MCHC RBC-ENTMCNC: 32 GM/DL
MCV RBC AUTO: 87.7 FL
MONOCYTES # BLD AUTO: 0.36 K/UL
MONOCYTES NFR BLD AUTO: 6.3 %
NEUTROPHILS # BLD AUTO: 2.74 K/UL
NEUTROPHILS NFR BLD AUTO: 47.9 %
PLATELET # BLD AUTO: 285 K/UL
POTASSIUM SERPL-SCNC: 4 MMOL/L
PT BLD: 13 SEC
RBC # BLD: 4.63 M/UL
RBC # FLD: 13.4 %
SODIUM SERPL-SCNC: 142 MMOL/L
WBC # FLD AUTO: 5.73 K/UL

## 2019-06-06 ENCOUNTER — OUTPATIENT (OUTPATIENT)
Dept: OUTPATIENT SERVICES | Facility: HOSPITAL | Age: 24
LOS: 1 days | End: 2019-06-06
Payer: COMMERCIAL

## 2019-06-06 DIAGNOSIS — B00.9 HERPESVIRAL INFECTION, UNSPECIFIED: ICD-10-CM

## 2019-06-06 PROCEDURE — 36573 INSJ PICC RS&I 5 YR+: CPT

## 2019-06-06 PROCEDURE — C1751: CPT

## 2019-06-06 PROCEDURE — C1894: CPT

## 2019-06-07 RX ORDER — AZITHROMYCIN 500 MG/1
1 TABLET, FILM COATED ORAL
Qty: 0 | Refills: 0 | DISCHARGE

## 2019-06-07 RX ORDER — FAMCICLOVIR 500 MG/1
1 TABLET, FILM COATED ORAL
Qty: 0 | Refills: 0 | DISCHARGE

## 2019-06-10 DIAGNOSIS — Z45.2 ENCOUNTER FOR ADJUSTMENT AND MANAGEMENT OF VASCULAR ACCESS DEVICE: ICD-10-CM

## 2019-06-18 PROBLEM — B20 HUMAN IMMUNODEFICIENCY VIRUS [HIV] DISEASE: Chronic | Status: ACTIVE | Noted: 2019-04-02

## 2019-06-18 PROBLEM — A60.00 HERPESVIRAL INFECTION OF UROGENITAL SYSTEM, UNSPECIFIED: Chronic | Status: ACTIVE | Noted: 2019-04-02

## 2019-06-19 ENCOUNTER — APPOINTMENT (OUTPATIENT)
Dept: OBGYN | Facility: CLINIC | Age: 24
End: 2019-06-19
Payer: COMMERCIAL

## 2019-06-19 PROCEDURE — 99203 OFFICE O/P NEW LOW 30 MIN: CPT | Mod: 25

## 2019-06-19 PROCEDURE — 56605 BIOPSY OF VULVA/PERINEUM: CPT

## 2019-06-20 ENCOUNTER — RESULT REVIEW (OUTPATIENT)
Age: 24
End: 2019-06-20

## 2019-06-26 ENCOUNTER — INPATIENT (INPATIENT)
Facility: HOSPITAL | Age: 24
LOS: 7 days | Discharge: HOME | End: 2019-07-04
Attending: INTERNAL MEDICINE | Admitting: INTERNAL MEDICINE
Payer: COMMERCIAL

## 2019-06-26 VITALS
TEMPERATURE: 98 F | DIASTOLIC BLOOD PRESSURE: 89 MMHG | RESPIRATION RATE: 18 BRPM | OXYGEN SATURATION: 100 % | SYSTOLIC BLOOD PRESSURE: 156 MMHG | HEART RATE: 66 BPM

## 2019-06-26 DIAGNOSIS — B20 HUMAN IMMUNODEFICIENCY VIRUS [HIV] DISEASE: ICD-10-CM

## 2019-06-26 DIAGNOSIS — A60.00 HERPESVIRAL INFECTION OF UROGENITAL SYSTEM, UNSPECIFIED: ICD-10-CM

## 2019-06-26 DIAGNOSIS — Z29.9 ENCOUNTER FOR PROPHYLACTIC MEASURES, UNSPECIFIED: ICD-10-CM

## 2019-06-26 LAB
ALBUMIN SERPL ELPH-MCNC: 3.9 G/DL — SIGNIFICANT CHANGE UP (ref 3.3–5)
ALP SERPL-CCNC: 73 U/L — SIGNIFICANT CHANGE UP (ref 40–120)
ALT FLD-CCNC: 16 U/L — SIGNIFICANT CHANGE UP (ref 4–33)
ANION GAP SERPL CALC-SCNC: 11 MMO/L — SIGNIFICANT CHANGE UP (ref 7–14)
APPEARANCE UR: SIGNIFICANT CHANGE UP
AST SERPL-CCNC: 18 U/L — SIGNIFICANT CHANGE UP (ref 4–32)
BACTERIA # UR AUTO: NEGATIVE — SIGNIFICANT CHANGE UP
BASE EXCESS BLDV CALC-SCNC: 3.3 MMOL/L — SIGNIFICANT CHANGE UP
BASOPHILS # BLD AUTO: 0.03 K/UL — SIGNIFICANT CHANGE UP (ref 0–0.2)
BASOPHILS NFR BLD AUTO: 0.4 % — SIGNIFICANT CHANGE UP (ref 0–2)
BILIRUB SERPL-MCNC: 1.5 MG/DL — HIGH (ref 0.2–1.2)
BILIRUB UR-MCNC: NEGATIVE — SIGNIFICANT CHANGE UP
BLOOD GAS VENOUS - CREATININE: 0.73 MG/DL — SIGNIFICANT CHANGE UP (ref 0.5–1.3)
BLOOD GAS VENOUS - FIO2: 21 — SIGNIFICANT CHANGE UP
BLOOD UR QL VISUAL: NEGATIVE — SIGNIFICANT CHANGE UP
BUN SERPL-MCNC: 10 MG/DL — SIGNIFICANT CHANGE UP (ref 7–23)
CALCIUM SERPL-MCNC: 9.1 MG/DL — SIGNIFICANT CHANGE UP (ref 8.4–10.5)
CHLORIDE BLDV-SCNC: 106 MMOL/L — SIGNIFICANT CHANGE UP (ref 96–108)
CHLORIDE SERPL-SCNC: 104 MMOL/L — SIGNIFICANT CHANGE UP (ref 98–107)
CO2 SERPL-SCNC: 24 MMOL/L — SIGNIFICANT CHANGE UP (ref 22–31)
COLOR SPEC: SIGNIFICANT CHANGE UP
CREAT SERPL-MCNC: 0.7 MG/DL — SIGNIFICANT CHANGE UP (ref 0.5–1.3)
EOSINOPHIL # BLD AUTO: 0.59 K/UL — HIGH (ref 0–0.5)
EOSINOPHIL NFR BLD AUTO: 7.9 % — HIGH (ref 0–6)
GAS PNL BLDV: 138 MMOL/L — SIGNIFICANT CHANGE UP (ref 136–146)
GLUCOSE BLDV-MCNC: 103 MG/DL — HIGH (ref 70–99)
GLUCOSE SERPL-MCNC: 104 MG/DL — HIGH (ref 70–99)
GLUCOSE UR-MCNC: NEGATIVE — SIGNIFICANT CHANGE UP
HCG SERPL-ACNC: < 5 MIU/ML — SIGNIFICANT CHANGE UP
HCO3 BLDV-SCNC: 26 MMOL/L — SIGNIFICANT CHANGE UP (ref 20–27)
HCT VFR BLD CALC: 38.6 % — SIGNIFICANT CHANGE UP (ref 34.5–45)
HCT VFR BLDV CALC: 39.2 % — SIGNIFICANT CHANGE UP (ref 34.5–45)
HGB BLD-MCNC: 12.3 G/DL — SIGNIFICANT CHANGE UP (ref 11.5–15.5)
HGB BLDV-MCNC: 12.8 G/DL — SIGNIFICANT CHANGE UP (ref 11.5–15.5)
HYALINE CASTS # UR AUTO: SIGNIFICANT CHANGE UP
IMM GRANULOCYTES NFR BLD AUTO: 0.1 % — SIGNIFICANT CHANGE UP (ref 0–1.5)
KETONES UR-MCNC: NEGATIVE — SIGNIFICANT CHANGE UP
LACTATE BLDV-MCNC: 1.3 MMOL/L — SIGNIFICANT CHANGE UP (ref 0.5–2)
LEUKOCYTE ESTERASE UR-ACNC: SIGNIFICANT CHANGE UP
LYMPHOCYTES # BLD AUTO: 2.49 K/UL — SIGNIFICANT CHANGE UP (ref 1–3.3)
LYMPHOCYTES # BLD AUTO: 33.2 % — SIGNIFICANT CHANGE UP (ref 13–44)
MCHC RBC-ENTMCNC: 27.3 PG — SIGNIFICANT CHANGE UP (ref 27–34)
MCHC RBC-ENTMCNC: 31.9 % — LOW (ref 32–36)
MCV RBC AUTO: 85.8 FL — SIGNIFICANT CHANGE UP (ref 80–100)
MONOCYTES # BLD AUTO: 0.59 K/UL — SIGNIFICANT CHANGE UP (ref 0–0.9)
MONOCYTES NFR BLD AUTO: 7.9 % — SIGNIFICANT CHANGE UP (ref 2–14)
NEUTROPHILS # BLD AUTO: 3.79 K/UL — SIGNIFICANT CHANGE UP (ref 1.8–7.4)
NEUTROPHILS NFR BLD AUTO: 50.5 % — SIGNIFICANT CHANGE UP (ref 43–77)
NITRITE UR-MCNC: NEGATIVE — SIGNIFICANT CHANGE UP
NRBC # FLD: 0 K/UL — SIGNIFICANT CHANGE UP (ref 0–0)
PCO2 BLDV: 50 MMHG — SIGNIFICANT CHANGE UP (ref 41–51)
PH BLDV: 7.37 PH — SIGNIFICANT CHANGE UP (ref 7.32–7.43)
PH UR: 8 — SIGNIFICANT CHANGE UP (ref 5–8)
PLATELET # BLD AUTO: 271 K/UL — SIGNIFICANT CHANGE UP (ref 150–400)
PMV BLD: 10.4 FL — SIGNIFICANT CHANGE UP (ref 7–13)
PO2 BLDV: 39 MMHG — SIGNIFICANT CHANGE UP (ref 35–40)
POTASSIUM BLDV-SCNC: 3.6 MMOL/L — SIGNIFICANT CHANGE UP (ref 3.4–4.5)
POTASSIUM SERPL-MCNC: 3.8 MMOL/L — SIGNIFICANT CHANGE UP (ref 3.5–5.3)
POTASSIUM SERPL-SCNC: 3.8 MMOL/L — SIGNIFICANT CHANGE UP (ref 3.5–5.3)
PROT SERPL-MCNC: 7 G/DL — SIGNIFICANT CHANGE UP (ref 6–8.3)
PROT UR-MCNC: 50 — SIGNIFICANT CHANGE UP
RBC # BLD: 4.5 M/UL — SIGNIFICANT CHANGE UP (ref 3.8–5.2)
RBC # FLD: 13.5 % — SIGNIFICANT CHANGE UP (ref 10.3–14.5)
RBC CASTS # UR COMP ASSIST: SIGNIFICANT CHANGE UP (ref 0–?)
SAO2 % BLDV: 68.8 % — SIGNIFICANT CHANGE UP (ref 60–85)
SODIUM SERPL-SCNC: 139 MMOL/L — SIGNIFICANT CHANGE UP (ref 135–145)
SP GR SPEC: 1.02 — SIGNIFICANT CHANGE UP (ref 1–1.04)
SQUAMOUS # UR AUTO: SIGNIFICANT CHANGE UP
UROBILINOGEN FLD QL: NORMAL — SIGNIFICANT CHANGE UP
WBC # BLD: 7.5 K/UL — SIGNIFICANT CHANGE UP (ref 3.8–10.5)
WBC # FLD AUTO: 7.5 K/UL — SIGNIFICANT CHANGE UP (ref 3.8–10.5)
WBC UR QL: >50 — HIGH (ref 0–?)

## 2019-06-26 PROCEDURE — 99222 1ST HOSP IP/OBS MODERATE 55: CPT | Mod: GC

## 2019-06-26 PROCEDURE — 99222 1ST HOSP IP/OBS MODERATE 55: CPT

## 2019-06-26 RX ORDER — AZTREONAM 2 G
1 VIAL (EA) INJECTION
Qty: 0 | Refills: 0 | DISCHARGE

## 2019-06-26 RX ORDER — KETOROLAC TROMETHAMINE 30 MG/ML
15 SYRINGE (ML) INJECTION ONCE
Refills: 0 | Status: DISCONTINUED | OUTPATIENT
Start: 2019-06-26 | End: 2019-06-26

## 2019-06-26 RX ORDER — RITONAVIR 100 MG/1
100 TABLET, FILM COATED ORAL DAILY
Refills: 0 | Status: DISCONTINUED | OUTPATIENT
Start: 2019-06-26 | End: 2019-07-04

## 2019-06-26 RX ORDER — EMTRICITABINE AND TENOFOVIR DISOPROXIL FUMARATE 200; 300 MG/1; MG/1
1 TABLET, FILM COATED ORAL DAILY
Refills: 0 | Status: DISCONTINUED | OUTPATIENT
Start: 2019-06-26 | End: 2019-07-04

## 2019-06-26 RX ORDER — ISOTRETINOIN 20 MG/1
1 CAPSULE, LIQUID FILLED ORAL
Qty: 0 | Refills: 0 | DISCHARGE

## 2019-06-26 RX ORDER — CEFAZOLIN SODIUM 1 G
1000 VIAL (EA) INJECTION ONCE
Refills: 0 | Status: COMPLETED | OUTPATIENT
Start: 2019-06-26 | End: 2019-06-26

## 2019-06-26 RX ORDER — ACYCLOVIR SODIUM 500 MG
400 VIAL (EA) INTRAVENOUS EVERY 8 HOURS
Refills: 0 | Status: DISCONTINUED | OUTPATIENT
Start: 2019-06-26 | End: 2019-07-04

## 2019-06-26 RX ORDER — CEFAZOLIN SODIUM 1 G
VIAL (EA) INJECTION
Refills: 0 | Status: DISCONTINUED | OUTPATIENT
Start: 2019-06-26 | End: 2019-07-04

## 2019-06-26 RX ORDER — AZITHROMYCIN 500 MG/1
2 TABLET, FILM COATED ORAL
Qty: 0 | Refills: 0 | DISCHARGE

## 2019-06-26 RX ORDER — RITONAVIR 100 MG/1
1 TABLET, FILM COATED ORAL
Qty: 0 | Refills: 0 | DISCHARGE

## 2019-06-26 RX ORDER — CEFAZOLIN SODIUM 1 G
1000 VIAL (EA) INJECTION EVERY 8 HOURS
Refills: 0 | Status: DISCONTINUED | OUTPATIENT
Start: 2019-06-26 | End: 2019-07-04

## 2019-06-26 RX ORDER — SODIUM CHLORIDE 9 MG/ML
1000 INJECTION INTRAMUSCULAR; INTRAVENOUS; SUBCUTANEOUS
Refills: 0 | Status: DISCONTINUED | OUTPATIENT
Start: 2019-06-26 | End: 2019-07-02

## 2019-06-26 RX ORDER — LIDOCAINE 4 G/100G
1 CREAM TOPICAL
Qty: 0 | Refills: 0 | DISCHARGE

## 2019-06-26 RX ORDER — ATAZANAVIR 200 MG/1
300 CAPSULE ORAL DAILY
Refills: 0 | Status: DISCONTINUED | OUTPATIENT
Start: 2019-06-26 | End: 2019-07-04

## 2019-06-26 RX ADMIN — Medication 15 MILLIGRAM(S): at 05:44

## 2019-06-26 RX ADMIN — EMTRICITABINE AND TENOFOVIR DISOPROXIL FUMARATE 1 TABLET(S): 200; 300 TABLET, FILM COATED ORAL at 18:53

## 2019-06-26 RX ADMIN — SODIUM CHLORIDE 100 MILLILITER(S): 9 INJECTION INTRAMUSCULAR; INTRAVENOUS; SUBCUTANEOUS at 18:23

## 2019-06-26 RX ADMIN — RITONAVIR 100 MILLIGRAM(S): 100 TABLET, FILM COATED ORAL at 18:54

## 2019-06-26 RX ADMIN — Medication 108 MILLIGRAM(S): at 18:56

## 2019-06-26 RX ADMIN — ATAZANAVIR 300 MILLIGRAM(S): 200 CAPSULE ORAL at 18:53

## 2019-06-26 RX ADMIN — Medication 100 MILLIGRAM(S): at 18:23

## 2019-06-26 NOTE — CONSULT NOTE ADULT - ASSESSMENT
22 yo woman with congenital HIV, now controlled with viral load detected <30 and CD4 215 on ART with refractory genital HSV 2 s/p recent course of IV foscarnet x 7 weeks presents with painful vulvar ulcers.  On exam there are 2 vulvar ulcers with exudate.  ?superinfection.  Concern for resistance; culture sent to reference lab for sensitivities on 6/4/19; [facility] contacted reference lab, expect results in next few days.  Patient was evaluated by dermatologist last week; biopsy done.    Suggest: Acyclovir 400 mg iv q 8 h                Cefazolin 1 gm iv q 8 h                check results of bx 22 yo woman with congenital HIV, now controlled with viral load detected <30 and CD4 215 on ART with refractory genital HSV 2 s/p recent course of IV foscarnet x 7 weeks presents with painful vulvar ulcers.  On exam there are 2 vulvar ulcers with exudate.  ?superinfection.  Concern for resistance; culture sent to reference lab for sensitivities on 6/4/19; [facility] contacted reference lab, expect results in next few days.  Patient was evaluated by dermatologist last week; biopsy done.    Suggest: Acyclovir 400 mg iv q 8 h                Cefazolin 1 gm iv q 8 h                check results of bx                 continue Rayataz 300 mg po daily, ritonovir 100 mg po daily,  Descovy 1 tab po daily                 bactrim DS 1 tab po 3x/ week                 azithro 1200 mg po weekly on sat      Lupe Andrade MD  Pager: [phone]  After 5 PM or weekends please call fellow on call or office [phone]

## 2019-06-26 NOTE — H&P ADULT - NSHPLABSRESULTS_GEN_ALL_CORE
CBC Full  -  ( 26 Jun 2019 05:11 )  WBC Count : 7.50 K/uL  Hemoglobin : 12.3 g/dL  Hematocrit : 38.6 %  Platelet Count - Automated : 271 K/uL  Mean Cell Volume : 85.8 fL  Mean Cell Hemoglobin : 27.3 pg  Mean Cell Hemoglobin Concentration : 31.9 %  Auto Neutrophil # : 3.79 K/uL  Auto Lymphocyte # : 2.49 K/uL  Auto Monocyte # : 0.59 K/uL  Auto Eosinophil # : 0.59 K/uL  Auto Basophil # : 0.03 K/uL  Auto Neutrophil % : 50.5 %  Auto Lymphocyte % : 33.2 %  Auto Monocyte % : 7.9 %  Auto Eosinophil % : 7.9 %  Auto Basophil % : 0.4 %    06-26    139  |  104  |  10  ----------------------------<  104<H>  3.8   |  24  |  0.70    Ca    9.1      26 Jun 2019 05:11    TPro  7.0  /  Alb  3.9  /  TBili  1.5<H>  /  DBili  x   /  AST  18  /  ALT  16  /  AlkPhos  73  06-26    LIVER FUNCTIONS - ( 26 Jun 2019 05:11 )  Alb: 3.9 g/dL / Pro: 7.0 g/dL / ALK PHOS: 73 u/L / ALT: 16 u/L / AST: 18 u/L / GGT: x               39 CBC Full  -  ( 26 Jun 2019 05:11 )  WBC Count : 7.50 K/uL  Hemoglobin : 12.3 g/dL  Hematocrit : 38.6 %  Platelet Count - Automated : 271 K/uL  Mean Cell Volume : 85.8 fL  Mean Cell Hemoglobin : 27.3 pg  Mean Cell Hemoglobin Concentration : 31.9 %  Auto Neutrophil # : 3.79 K/uL  Auto Lymphocyte # : 2.49 K/uL  Auto Monocyte # : 0.59 K/uL  Auto Eosinophil # : 0.59 K/uL  Auto Basophil # : 0.03 K/uL  Auto Neutrophil % : 50.5 %  Auto Lymphocyte % : 33.2 %  Auto Monocyte % : 7.9 %  Auto Eosinophil % : 7.9 %  Auto Basophil % : 0.4 %    06-26    139  |  104  |  10  ----------------------------<  104<H>  3.8   |  24  |  0.70    Ca    9.1      26 Jun 2019 05:11    TPro  7.0  /  Alb  3.9  /  TBili  1.5<H>  /  DBili  x   /  AST  18  /  ALT  16  /  AlkPhos  73  06-26    LIVER FUNCTIONS - ( 26 Jun 2019 05:11 )  Alb: 3.9 g/dL / Pro: 7.0 g/dL / ALK PHOS: 73 u/L / ALT: 16 u/L / AST: 18 u/L / GGT: x         39

## 2019-06-26 NOTE — H&P ADULT - ATTENDING COMMENTS
Genital herpes with possible superimposed infection, starting IV Acyclovir and Ancef as per ID, f/up results of biopsy   Congenital HIV, continue ART

## 2019-06-26 NOTE — ED PROVIDER NOTE - OBJECTIVE STATEMENT
23 year old female with a PMHx of HIV (CD4 count 214 on ART), IRIS syndrome, HSV pw resistant genital HSV vaginal lesions. Had first outbreak in December - she was initially on Valacyclovir for 3 months with no improvement switched to Famciclovir for 3 weeks without improvement was admitted in April and on IV foscarnet and dc'ed with PICC for 6 weeks (dc'ed on PICC on 5/22). She was then placed on topical trifluridine and unable to tolerate the aldara. Pt states she continues to have pain. Denies f/c, n/v, CP, SOB, abdominal pain, hematuria, melena, hematochezia, diarrhea. 23 year old female with a PMHx of HIV (CD4 count 214 on ART), IRIS syndrome, HSV pw resistant genital HSV vaginal lesions. Had first outbreak in December - she was initially on Valacyclovir for 3 months with no improvement switched to Famciclovir for 3 weeks without improvement was admitted in April and on IV foscarnet and dc'ed with PICC for 6 weeks (dc'ed on PICC on 5/22). She was then placed on topical trifluridine and unable to tolerate the aldara. She had PICC placed again from 6/9-6/20 for IV foscarnet. Pt states she continues to have pain. Denies f/c, n/v, CP, SOB, abdominal pain, hematuria, melena, hematochezia, diarrhea.

## 2019-06-26 NOTE — ED PROVIDER NOTE - PROGRESS NOTE DETAILS
ID was paged 3 times to fellows phone at following times :0900, 10,05, 1100.   Attg paged once at 1130,  called at 1200 whom said they would contact and give my direct spectra. No call back yet Was called back from ID, unaware of who was supposed to be following patient, Fellow will return with information about who is seeing patient today. Spoke with ID Fellow. Attg Dr. Andrade is assigned to the case. Dr. Andrade should be down to the ED shortly, but will likely recommend admission. Dr. Andrade evaluated patient, recommending admission for IV acyclovir and IV abx.   No isolation requirements.  Admit to \[facility]\""[facility]

## 2019-06-26 NOTE — H&P ADULT - NSHPPHYSICALEXAM_GEN_ALL_CORE
PHYSICAL EXAM:    Vital Signs Last 24 Hrs  T(C): 36.5 (26 Jun 2019 16:15), Max: 36.8 (26 Jun 2019 02:37)  T(F): 97.7 (26 Jun 2019 16:15), Max: 98.3 (26 Jun 2019 02:37)  HR: 62 (26 Jun 2019 16:15) (60 - 66)  BP: 149/110 (26 Jun 2019 16:15) (149/110 - 167/102)  BP(mean): --  RR: 16 (26 Jun 2019 16:15) (16 - 18)  SpO2: 100% (26 Jun 2019 16:15) (100% - 100%)    General: No acute distress.  HEENT: NCAT.  PERRL.  EOMI.  No scleral icterus or injection.  Moist MM.  No oropharyngeal exudates.    Neck: Supple.  Full ROM.  No JVD.  No thyromegaly. No lymphadenopathy.   Heart: RRR.  Normal S1 and S2.  No murmurs, rubs, or gallops.   Lungs: CTAB. No wheezes, crackles, or rhonchi.    Abdomen: BS+, soft, NT/ND.  No organomegaly.  Skin: Warm and dry.  No rashes.  Extremities: No edema, clubbing, or cyanosis.  2+ peripheral pulses b/l.  Musculoskeletal: No deformities.  No spinal or paraspinal tenderness.  Neuro: A&Ox3.  CN II-XII intact.  5/5 strength in UE and LE b/l.  Tactile sensation intact in UE and LE b/l.  Cerebellar function intact PHYSICAL EXAM:    Vital Signs Last 24 Hrs  T(C): 36.5 (26 Jun 2019 16:15), Max: 36.8 (26 Jun 2019 02:37)  T(F): 97.7 (26 Jun 2019 16:15), Max: 98.3 (26 Jun 2019 02:37)  HR: 62 (26 Jun 2019 16:15) (60 - 66)  BP: 149/110 (26 Jun 2019 16:15) (149/110 - 167/102)  BP(mean): --  RR: 16 (26 Jun 2019 16:15) (16 - 18)  SpO2: 100% (26 Jun 2019 16:15) (100% - 100%)    General: No acute distress.  HEENT: NCAT.  PERRL.  EOMI.  No scleral icterus or injection.  Moist MM.  No oropharyngeal exudates.    Neck: Supple.  Full ROM.  No JVD.  No thyromegaly. No lymphadenopathy.   Heart: RRR.  Normal S1 and S2.  No murmurs, rubs, or gallops.   Lungs: CTAB. No wheezes, crackles, or rhonchi.    Abdomen: BS+, soft, NT/ND.  No organomegaly.  Genital exam: pt refused   Skin: Warm and dry.  No rashes.  Extremities: No edema, clubbing, or cyanosis.  2+ peripheral pulses b/l.  Musculoskeletal: No deformities.  No spinal or paraspinal tenderness.  Neuro: A&Ox3.  no focal deficit

## 2019-06-26 NOTE — H&P ADULT - ASSESSMENT
24 yo woman with congenital HIV admitted to medicine for genital HSV 2 refractory to several antivirals.

## 2019-06-26 NOTE — H&P ADULT - PROBLEM SELECTOR PLAN 2
continue home HIV medications  Rayataz 300 mg po daily, ritonovir 100 mg po daily,  Descovy 1 tab po daily, bactrim DS 1 tab po 3x/ week, azithro 1200 mg po weekly on sat - continue home HIV medications  Rayataz 300 mg po daily, ritonovir 100 mg po daily,  Descovy 1 tab po daily, bactrim DS 1 tab po 3x/ week, azithro 1200 mg po weekly on sat  - outpt f/u with ID clinic

## 2019-06-26 NOTE — ED PROVIDER NOTE - CLINICAL SUMMARY MEDICAL DECISION MAKING FREE TEXT BOX
23 year old female with a PMHx of HIV (CD4 count 214 on ART), IRIS syndrome, HSV pw resistant genital HSV vaginal lesions.  Plan: labs, pain management, ID

## 2019-06-26 NOTE — CONSULT NOTE ADULT - SUBJECTIVE AND OBJECTIVE BOX
HPI:      PAST MEDICAL & SURGICAL HISTORY:  Genital herpes  HIV (human immunodeficiency virus infection)  No significant past surgical history      Allergies    No Known Allergies    Intolerances        ANTIMICROBIALS:      OTHER MEDS:      SOCIAL HISTORY:    FAMILY HISTORY:      REVIEW OF SYSTEMS  [  ] ROS unobtainable because:    [  ] All other systems negative except as noted below:	    Constitutional:  [ ] fever [ ] chills  [ ] weight loss  [ ] weakness  Skin:  [ ] rash [ ] phlebitis	  Eyes: [ ] icterus [ ] pain  [ ] discharge	  ENMT: [ ] sore throat  [ ] thrush [ ] ulcers [ ] exudates  Respiratory: [ ] dyspnea [ ] hemoptysis [ ] cough [ ] sputum	  Cardiovascular:  [ ] chest pain [ ] palpitations [ ] edema	  Gastrointestinal:  [ ] nausea [ ] vomiting [ ] diarrhea [ ] constipation [ ] pain	  Genitourinary:  [ ] dysuria [ ] frequency [ ] hematuria [ ] discharge [ ] flank pain  [ ] incontinence  Musculoskeletal:  [ ] myalgias [ ] arthralgias [ ] arthritis  [ ] back pain  Neurological:  [ ] headache [ ] seizures  [ ] confusion/altered mental status  Psychiatric:  [ ] anxiety [ ] depression	  Hematology/Lymphatics:  [ ] lymphadenopathy  Endocrine:  [ ] adrenal [ ] thyroid  Allergic/Immunologic:	 [ ] transplant [ ] seasonal    PHYSICAL EXAM:  General: [ ] non-toxic  HEAD/EYES: [ ] PERRL [ ] white sclera [ ] icterus  ENT:  [ ] normal [ ] supple [ ] thrush [ ] pharyngeal exudate  Cardiovascular:   [ ] murmur [ ] normal [ ] PPM/AICD  Respiratory:  [ ] clear to ausculation bilaterally  GI:  [ ] soft, non-tender, normal bowel sounds  :  [ ] rutherford [ ] no CVA tenderness   Musculoskeletal:  [ ] no synovitis  Neurologic:  [ ] non-focal exam   Skin:  [ ] no rash  Lymph: [ ] no lymphadenopathy  Psychiatric:  [ ] appropriate affect [ ] alert & oriented  Lines:  [ ] no phlebitis [ ] central line          Drug Dosing Weight  Height (cm): 160 (03 Apr 2019 04:25)  Weight (kg): 67.5 (02 Apr 2019 21:57)  BMI (kg/m2): 26.4 (03 Apr 2019 04:25)  BSA (m2): 1.71 (03 Apr 2019 04:25)    Vital Signs Last 24 Hrs  T(F): 97.3 (06-26-19 @ 12:33), Max: 98.3 (06-26-19 @ 02:37)    Vital Signs Last 24 Hrs  HR: 63 (06-26-19 @ 12:33) (60 - 66)  BP: 167/102 (06-26-19 @ 12:33) (156/89 - 167/102)  RR: 17 (06-26-19 @ 12:33)  SpO2: 100% (06-26-19 @ 12:33) (100% - 100%)  Wt(kg): --                          12.3   7.50  )-----------( 271      ( 26 Jun 2019 05:11 )             38.6       06-26    139  |  104  |  10  ----------------------------<  104<H>  3.8   |  24  |  0.70    Ca    9.1      26 Jun 2019 05:11    TPro  7.0  /  Alb  3.9  /  TBili  1.5<H>  /  DBili  x   /  AST  18  /  ALT  16  /  AlkPhos  73  06-26          MICROBIOLOGY:          v            RADIOLOGY: HPI: 22 yo woman with congenital HIV,  genital HSV2 refractory to several antivirals, treated in past with IV foscarnet x 7 weeks who returns to ER with increased pain vulvar lesions.  Treated with IV foscarnet x 6 weeks April - May 2019 and again at higher dose earlier this month.  PIC was removed 6/21.  Viral culture was sent from outpatient office for sensitivities on 6/4; called [facility] representative who contacted reference lab, expect results in few days.  Patient was evaluated by dermatology- biopsy taken; results pending.  Compliant with ART. Last viral load <30 CD4 215.      PAST MEDICAL & SURGICAL HISTORY:  Genital herpes  HIV (human immunodeficiency virus infection)  No significant past surgical history      Allergies    No Known Allergies    Intolerances        ANTIMICROBIALS:      OTHER MEDS:  Rayataz                         Descovy                         Ritonovir                         bactrim 3x/week                         azithro 1200 mg po weekly    SOCIAL HISTORY:    FAMILY HISTORY:      REVIEW OF SYSTEMS  [  ] ROS unobtainable because:    [x  ] All other systems negative except as noted below:	    Constitutional:  [ ] fever [ ] chills  [ ] weight loss  [ ] weakness  Skin:  [ ] rash [ ] phlebitis	  Eyes: [ ] icterus [ ] pain  [ ] discharge	  ENMT: [ ] sore throat  [ ] thrush [ ] ulcers [ ] exudates  Respiratory: [ ] dyspnea [ ] hemoptysis [ ] cough [ ] sputum	  Cardiovascular:  [ ] chest pain [ ] palpitations [ ] edema	  Gastrointestinal:  [ ] nausea [ ] vomiting [ ] diarrhea [ ] constipation [ ] pain	  Genitourinary:  [ ] dysuria [ ] frequency [ ] hematuria [ ] discharge [ ] flank pain  [ ] incontinence painful vaginal ulcers  Musculoskeletal:  [ ] myalgias [ ] arthralgias [ ] arthritis  [ ] back pain  Neurological:  [ ] headache [ ] seizures  [ ] confusion/altered mental status  Psychiatric:  [ ] anxiety [ ] depression	  Hematology/Lymphatics:  [ ] lymphadenopathy  Endocrine:  [ ] adrenal [ ] thyroid  Allergic/Immunologic:	 [ ] transplant [ ] seasonal    PHYSICAL EXAM:  General: [x ] non-toxic  HEAD/EYES: [ ] PERRL [x ] white sclera [ ] icterus  ENT:  [ ] normal [x ] supple [ ] thrush [ ] pharyngeal exudate  Cardiovascular:   [ ] murmur [x ] normal [ ] PPM/AICD  Respiratory:  [x ] clear to ausculation bilaterally  GI:  [x ] soft, non-tender, normal bowel sounds  :  shallow ulcers on vulva bilat x 2 with exudate  Musculoskeletal:  [x ] no synovitis  Neurologic:  [x ] non-focal exam   Skin:  [x ] bulla left arm near prior PIC dressing site  Psychiatric:  [x ] appropriate affect [x ] alert & oriented  Lines:  [x ] no phlebitis [ ] central line          Drug Dosing Weight  Height (cm): 160 (03 Apr 2019 04:25)  Weight (kg): 67.5 (02 Apr 2019 21:57)  BMI (kg/m2): 26.4 (03 Apr 2019 04:25)  BSA (m2): 1.71 (03 Apr 2019 04:25)    Vital Signs Last 24 Hrs  T(F): 97.3 (06-26-19 @ 12:33), Max: 98.3 (06-26-19 @ 02:37)    Vital Signs Last 24 Hrs  HR: 63 (06-26-19 @ 12:33) (60 - 66)  BP: 167/102 (06-26-19 @ 12:33) (156/89 - 167/102)  RR: 17 (06-26-19 @ 12:33)  SpO2: 100% (06-26-19 @ 12:33) (100% - 100%)  Wt(kg): --                          12.3   7.50  )-----------( 271      ( 26 Jun 2019 05:11 )             38.6       06-26    139  |  104  |  10  ----------------------------<  104<H>  3.8   |  24  |  0.70    Ca    9.1      26 Jun 2019 05:11    TPro  7.0  /  Alb  3.9  /  TBili  1.5<H>  /  DBili  x   /  AST  18  /  ALT  16  /  AlkPhos  73  06-26          MICROBIOLOGY:        RADIOLOGY: HPI: 24 yo woman with congenital HIV,  genital HSV 2 refractory to several antivirals, treated in past with IV foscarnet x 7 weeks who returns to ER with increased pain vulvar lesions.  Treated with IV foscarnet x 6 weeks April - May 2019 and again at higher dose earlier this month.  PIC was removed .  Viral culture was sent from outpatient office for sensitivities on ; called [facility] representative who contacted reference lab, expect results in few days.  Patient was evaluated by dermatology last week- biopsy taken; results pending.  Compliant with ART. Last viral load <30 CD4 215.      PAST MEDICAL & SURGICAL HISTORY:  Genital herpes  HIV (human immunodeficiency virus infection)  No significant past surgical history      Allergies    No Known Allergies    Intolerances      MEDS:  Rayataz                         Descovy                         Ritonovir                         bactrim 3x/week                         azithro 1200 mg po weekly    SOCIAL HISTORY: student    FAMILY HISTORY: mother  of HIV      REVIEW OF SYSTEMS  [  ] ROS unobtainable because:    [x  ] All other systems negative except as noted below:	    Constitutional:  [ ] fever [ ] chills  [ ] weight loss  [ ] weakness  Skin:  [ ] rash [ ] phlebitis	  Eyes: [ ] icterus [ ] pain  [ ] discharge	  ENMT: [ ] sore throat  [ ] thrush [ ] ulcers [ ] exudates  Respiratory: [ ] dyspnea [ ] hemoptysis [ ] cough [ ] sputum	  Cardiovascular:  [ ] chest pain [ ] palpitations [ ] edema	  Gastrointestinal:  [ ] nausea [ ] vomiting [ ] diarrhea [ ] constipation [ ] pain	  Genitourinary:  [ ] dysuria [ ] frequency [ ] hematuria [ ] discharge [ ] flank pain  [ ] incontinence painful vaginal ulcers  Musculoskeletal:  [ ] myalgias [ ] arthralgias [ ] arthritis  [ ] back pain  Neurological:  [ ] headache [ ] seizures  [ ] confusion/altered mental status  Psychiatric:  [ ] anxiety [ ] depression	  Hematology/Lymphatics:  [ ] lymphadenopathy  Endocrine:  [ ] adrenal [ ] thyroid  Allergic/Immunologic:	 [ ] transplant [ ] seasonal    PHYSICAL EXAM:  General: [x ] non-toxic  HEAD/EYES: [ ] PERRL [x ] white sclera [ ] icterus  ENT:  [ ] normal [x ] supple [ ] thrush [ ] pharyngeal exudate  Cardiovascular:   [ ] murmur [x ] normal [ ] PPM/AICD  Respiratory:  [x ] clear to ausculation bilaterally  GI:  [x ] soft, non-tender, normal bowel sounds  :  shallow ulcers on vulva bilat x 2 with exudate  Musculoskeletal:  [x ] no synovitis  Neurologic:  [x ] non-focal exam   Skin:  [x ] bulla left arm near prior PIC dressing site  Psychiatric:  [x ] appropriate affect [x ] alert & oriented  Lines:  [x ] no phlebitis [ ] central line          Drug Dosing Weight  Height (cm): 160 (2019 04:25)  Weight (kg): 67.5 (2019 21:57)  BMI (kg/m2): 26.4 (2019 04:25)  BSA (m2): 1.71 (2019 04:25)    Vital Signs Last 24 Hrs  T(F): 97.3 (19 @ 12:33), Max: 98.3 (19 @ 02:37)    Vital Signs Last 24 Hrs  HR: 63 (19 @ 12:33) (60 - 66)  BP: 167/102 (19 @ 12:33) (156/89 - 167/102)  RR: 17 (19 @ 12:33)  SpO2: 100% (19 @ 12:33) (100% - 100%)  Wt(kg): --                          12.3   7.50  )-----------( 271      ( 2019 05:11 )             38.6           139  |  104  |  10  ----------------------------<  104<H>  3.8   |  24  |  0.70    Ca    9.1      2019 05:11    TPro  7.0  /  Alb  3.9  /  TBili  1.5<H>  /  DBili  x   /  AST  18  /  ALT  16  /  AlkPhos  73            MICROBIOLOGY:    HSV culture and sensitivities testing ([facility]) 19    RADIOLOGY:

## 2019-06-26 NOTE — ED ADULT TRIAGE NOTE - CHIEF COMPLAINT QUOTE
pt reports genital herpes x 1 month that has not been improving despite valacyclovir and Foscarnet. pt requesting to try a different medication

## 2019-06-26 NOTE — H&P ADULT - NSHPREVIEWOFSYSTEMS_GEN_ALL_CORE
REVIEW OF SYSTEMS:    CONSTITUTIONAL: No weakness, fevers or chills  EYES/ENT: No visual changes;  No vertigo or throat pain   NECK: No pain or stiffness  ENDOCRINE: no cold/hold intolerance, no polydipsia, no polyuria   HEMATOLOGY: no bruising, no bleeding, no lymphadenopathy   RESPIRATORY: No cough, wheezing, hemoptysis; No shortness of breath  CARDIOVASCULAR: No chest pain or palpitations  GASTROINTESTINAL: No abdominal pain; nausea, vomiting;  diarrhea or constipation. No hemetemesis, melena or hematochezia.  GENITOURINARY: No dysuria, frequency or hematuria  MUSCULOSKELETAL:  No joint pain, no joint swelling   NEUROLOGICAL: No numbness or weakness  MUSCULOSKELETAL: no back pain, no spine deformity, no joint pain or deformity, no muscle ache   SKIN: No itching, burning, rashes, or lesions REVIEW OF SYSTEMS:    CONSTITUTIONAL: No weakness, fevers or chills  EYES/ENT: No visual changes;  No vertigo or throat pain   NECK: No pain or stiffness  ENDOCRINE: no cold/hold intolerance, no polydipsia, no polyuria   HEMATOLOGY: no bruising, no bleeding, no lymphadenopathy   RESPIRATORY: No cough, wheezing, hemoptysis; No shortness of breath  CARDIOVASCULAR: No chest pain or palpitations  GASTROINTESTINAL: No abdominal pain; nausea, vomiting;  diarrhea or constipation. No hemetemesis, melena or hematochezia.  GENITOURINARY:  + genital/vular pain, No dysuria, frequency or hematuria  MUSCULOSKELETAL:  No joint pain, no joint swelling   NEUROLOGICAL: No numbness or weakness  MUSCULOSKELETAL: no back pain, no spine deformity, no joint pain or deformity, no muscle ache   SKIN: No itching, burning, rashes, or lesions REVIEW OF SYSTEMS:    CONSTITUTIONAL: No weakness, fevers or chills  EYES/ENT: No visual changes;  No vertigo or throat pain   NECK: No pain or stiffness  ENDOCRINE: no cold/hold intolerance, no polydipsia, no polyuria   HEMATOLOGY: no bruising, no bleeding, no lymphadenopathy   RESPIRATORY: No cough, wheezing, hemoptysis; No shortness of breath  CARDIOVASCULAR: No chest pain or palpitations  GASTROINTESTINAL: No abdominal pain; nausea, vomiting;  diarrhea or constipation. No hemetemesis, melena or hematochezia.  GENITOURINARY:  + genital/vular pain, No dysuria, frequency or hematuria  MUSCULOSKELETAL:  No joint pain, no joint swelling   NEUROLOGICAL: No numbness or weakness  MUSCULOSKELETAL: no back pain, no spine deformity, no joint pain or deformity, no muscle ache   SKIN: No itching, burning, rashes, or lesions  PSYCH: No depression, no anxiety

## 2019-06-26 NOTE — ED PROVIDER NOTE - ATTENDING CONTRIBUTION TO CARE
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  Pt reports multiple failed antiviral trials, including topical meds and 6 week course of foscarnet.  She is being followed by ID as an outpatient, pending results of resistance testing and derm biopsy results.  She reports that the pain has recently gotten much worse and she has 2 lesions that have gotten larger and more painful.  No fever, chills, rash, dysuria, abd pain, back pain.  She has been taking naproxen at home for pain without relief.  Well appearing, sititng in stretcher, awake and alert, nontoxic.  AF/VSS.  Lungs cta bl.  Cards nl S1/S2, RRR, no MRG.  Abd soft ntnd.  (+)2 large vesicular lesions to medial aspects of bilateral labia with purulent discharge, no surrounding erythema induration or crepitus.  Plan for labs, ucg, pain meds, d/w ID regarding treatment options, consider admission for pain control and further treatment.

## 2019-06-26 NOTE — H&P ADULT - PROBLEM SELECTOR PLAN 1
ID consult appreciated: will start on Acyclovir 400 mg iv q 8 h, Cefazolin 1 gm iv q8 - ID consult appreciated: will start on Acyclovir 400 mg iv q 8 h, Cefazolin 1 gm iv q8  - afebrile, BP stable, no leukocytosis, does not meet sepsis criteria   -  Ketoralac PRN for pain management - ID consult appreciated: will start on Acyclovir 400 mg iv q 8 h, Cefazolin 1 gm iv q8  - afebrile, BP stable, no leukocytosis, does not meet sepsis criteria   -  Ketoralac PRN for pain management  - f/u bx result

## 2019-06-26 NOTE — H&P ADULT - HISTORY OF PRESENT ILLNESS
24 yo woman with congenital HIV,  genital HSV 2 refractory to several antivirals, treated in past with IV foscarnet x 7 weeks who returns to ER with increased pain vulvar lesions.  Treated with IV foscarnet x 6 weeks April - May 2019 and again at higher dose earlier this month.  PIC was removed 6/21.  Viral culture was sent from outpatient office for sensitivities on 6/4; called [facility] representative who contacted reference lab, expect results in few days.  Patient was evaluated by dermatology last week- biopsy taken; results pending.  Compliant with ART. Last viral load <30 CD4 215. 22 yo woman with congenital HIV,  genital HSV 2 refractory to several antivirals, treated in past with IV foscarnet x 7 weeks who returns to ER with increased pain vulvar lesions. Currently has 2 painful lesions in the labia.   Treated with IV foscarnet x 6 weeks April - May 2019 and again at higher dose earlier this month.  PICC was removed 6/21.  Viral culture was sent from outpatient office for sensitivities on 6/4; called [facility] representative who contacted reference lab, expect results in few days.  Patient was evaluated by dermatology last week- biopsy taken; results pending.  Compliant with ART. Last viral load <30 CD4 215.    ED course: given Ketoralac x1, pain improved

## 2019-06-27 LAB
4/8 RATIO: 0.32 CELLS/UL — LOW (ref 1.69–2.84)
ABS CD8: 1060 CELLS/UL — HIGH (ref 291–576)
CD16+CD56+ CELLS NFR BLD: 9 % — SIGNIFICANT CHANGE UP (ref 6–22)
CD16+CD56+ CELLS NFR SPEC: 206 CELL/UL — SIGNIFICANT CHANGE UP (ref 59–453)
CD19 BLASTS SPEC-ACNC: 24 % — HIGH (ref 8–22)
CD19 BLASTS SPEC-ACNC: 552 CELL/UL — HIGH (ref 105–430)
CD3 BLASTS SPEC-ACNC: 1546 CELLS/UL — SIGNIFICANT CHANGE UP (ref 678–2144)
CD3 BLASTS SPEC-ACNC: 67 % — SIGNIFICANT CHANGE UP (ref 62–83)
CD4 %: 15 % — LOW (ref 43–52)
CD8 %: 46 % — HIGH (ref 17–28)
T-CELL CD4 SUBSET PNL BLD: 344 CELL/UL — LOW (ref 431–1434)

## 2019-06-27 PROCEDURE — 99232 SBSQ HOSP IP/OBS MODERATE 35: CPT

## 2019-06-27 PROCEDURE — 99232 SBSQ HOSP IP/OBS MODERATE 35: CPT | Mod: GC

## 2019-06-27 RX ORDER — AZITHROMYCIN 500 MG/1
1200 TABLET, FILM COATED ORAL
Refills: 0 | Status: DISCONTINUED | OUTPATIENT
Start: 2019-06-27 | End: 2019-07-04

## 2019-06-27 RX ORDER — RITONAVIR 100 MG/1
1 TABLET, FILM COATED ORAL
Qty: 0 | Refills: 0 | DISCHARGE

## 2019-06-27 RX ORDER — EMTRICITABINE AND TENOFOVIR DISOPROXIL FUMARATE 200; 300 MG/1; MG/1
1 TABLET, FILM COATED ORAL
Qty: 0 | Refills: 0 | DISCHARGE

## 2019-06-27 RX ORDER — ATAZANAVIR 200 MG/1
1 CAPSULE ORAL
Qty: 0 | Refills: 0 | DISCHARGE

## 2019-06-27 RX ORDER — AZITHROMYCIN 500 MG/1
1200 TABLET, FILM COATED ORAL
Refills: 0 | Status: DISCONTINUED | OUTPATIENT
Start: 2019-06-27 | End: 2019-06-27

## 2019-06-27 RX ADMIN — Medication 108 MILLIGRAM(S): at 18:02

## 2019-06-27 RX ADMIN — SODIUM CHLORIDE 100 MILLILITER(S): 9 INJECTION INTRAMUSCULAR; INTRAVENOUS; SUBCUTANEOUS at 03:10

## 2019-06-27 RX ADMIN — RITONAVIR 100 MILLIGRAM(S): 100 TABLET, FILM COATED ORAL at 12:12

## 2019-06-27 RX ADMIN — Medication 100 MILLIGRAM(S): at 18:03

## 2019-06-27 RX ADMIN — Medication 108 MILLIGRAM(S): at 03:10

## 2019-06-27 RX ADMIN — Medication 100 MILLIGRAM(S): at 03:10

## 2019-06-27 RX ADMIN — Medication 108 MILLIGRAM(S): at 10:00

## 2019-06-27 RX ADMIN — SODIUM CHLORIDE 100 MILLILITER(S): 9 INJECTION INTRAMUSCULAR; INTRAVENOUS; SUBCUTANEOUS at 18:01

## 2019-06-27 RX ADMIN — EMTRICITABINE AND TENOFOVIR DISOPROXIL FUMARATE 1 TABLET(S): 200; 300 TABLET, FILM COATED ORAL at 12:12

## 2019-06-27 RX ADMIN — Medication 100 MILLIGRAM(S): at 10:52

## 2019-06-27 RX ADMIN — ATAZANAVIR 300 MILLIGRAM(S): 200 CAPSULE ORAL at 12:12

## 2019-06-27 NOTE — PROGRESS NOTE ADULT - PROBLEM SELECTOR PLAN 3
DVT prophylaxis: pt is ambulatory, no pharm agent required  Diet: Regular DVT prophylaxis: pt is ambulatory, no pharm agent required  Diet: Regular  Dispo: Inpatient

## 2019-06-27 NOTE — PROGRESS NOTE ADULT - PROBLEM SELECTOR PLAN 2
- continue home HIV medications  Rayataz 300 mg po daily, ritonovir 100 mg po daily,  Descovy 1 tab po daily  - Patient does not take bactrim DS 1 tab po 3x/ week, azithro 1200 mg po weekly on sat  - outpt f/u with ID clinic - continue home HIV medications  Rayataz 300 mg po daily, ritonovir 100 mg po daily,  Descovy 1 tab po daily  - Patient does not take bactrim DS 1 tab po 3x/ week, azithro 1200 mg po weekly on sat. Will administer per schedule while in hospital for prophylaxis  - outpt f/u with ID clinic - Continue home HIV medications  Rayataz 300 mg po daily, ritonovir 100 mg po daily,  Descovy 1 tab po daily  - Patient does not take bactrim DS 1 tab po 3x/ week, azithro 1200 mg po weekly on sat. Will administer per schedule while in hospital for prophylaxis  - outpt f/u with ID clinic

## 2019-06-27 NOTE — PROGRESS NOTE ADULT - PROBLEM SELECTOR PLAN 1
- c/w Acyclovir 400 mg iv q 8 h, Cefazolin 1 gm IV q8 per ID recs  - afebrile, BP stable, no leukocytosis  Known diagnosis in the setting of HIV. Current lesions have been present for 4 months despite treatment with multiple antivirals. Causes patient pain, controlled well with Ketorolac.   - Ketoralac PRN for pain management, well controlled  - f/u bx result  - f/u swab results for sensitivities Known diagnosis in the setting of HIV. Current lesions have been present for 4 months despite treatment with multiple antivirals. Causes patient pain, controlled well with Ketorolac. Lesions have purulent exudate, which is unexpected for HSV, so consider superinfection, which could explain lack of response to antivirals.  - c/w Acyclovir 400 mg iv q 8 h, Cefazolin 1 gm IV q8 per ID recs  - afebrile, BP stable, no leukocytosis  - Ketoralac PRN for pain management, well controlled  - f/u bx result  - f/u swab results for sensitivities  - Chlamydia/gonorrhea swab of exudate Known diagnosis in the setting of HIV. Current lesions have been present for 4 months despite treatment with multiple antivirals. Causes patient pain, controlled well with Ketorolac. Lesions have purulent exudate, which is unexpected for HSV, so consider superinfection, which could explain lack of response to antivirals.  - c/w Acyclovir 400 mg iv q 8 h, Cefazolin 1 gm IV q8 per ID recs  - afebrile, BP stable, no leukocytosis  - Ketoralac PRN for pain management, well controlled  - f/u bx result  - f/u swab results for sensitivities  - Chlamydia/gonorrhea urine test

## 2019-06-27 NOTE — PROGRESS NOTE ADULT - ATTENDING COMMENTS
Genital herpes with possible superimposed infection, c/w IV Acyclovir and IV Ancef, obtain results of biopsy   Congenital HIV, continue ART.

## 2019-06-27 NOTE — PROGRESS NOTE ADULT - SUBJECTIVE AND OBJECTIVE BOX
Patient is a 23y old  Female who presents with a chief complaint of genital HSV (2019 07:36)    SUBJECTIVE / OVERNIGHT EVENTS:    HPI:  24 yo woman with congenital HIV,  genital HSV 2 refractory to several antivirals, treated in past with IV foscarnet x 7 weeks who returns to ER with increased pain and vulvar lesions. Currently has 2 painful lesions in the left labia.   Treated with IV foscarnet x 6 weeks April - May 2019 and again at higher dose earlier this month.  PICC was removed . Patient developed local skin reaction with blisters at PICC site.   Viral culture was sent from outpatient office for sensitivities on ; called [facility] representative who contacted reference lab, expect results in few days.  Patient was evaluated by dermatology last week- biopsy taken; results pending.  Compliant with ART. Last viral load <30 CD4 161.    She is not compliant with TMP/Sulfa or Azithromycin prophylaxis, as she does not remember to take the medications and does not think she can.    ED course: given Ketoralac x1, pain improved (2019 16:38)      MEDICATIONS  (STANDING):  acyclovir IVPB 400 milliGRAM(s) IV Intermittent every 8 hours  atazanavir 300 milliGRAM(s) Oral daily  azithromycin   Tablet 1200 milliGRAM(s) Oral <User Schedule>  ceFAZolin   IVPB 1000 milliGRAM(s) IV Intermittent every 8 hours  ceFAZolin   IVPB      emtricitabine 200 mG/tenofovir alafenamide 25 mG (DESCOVY) Tablet 1 Tablet(s) Oral daily  ritonavir Tablet 100 milliGRAM(s) Oral daily  sodium chloride 0.9%. 1000 milliLiter(s) (100 mL/Hr) IV Continuous <Continuous>  trimethoprim  160 mG/sulfamethoxazole 800 mG 1 Tablet(s) Oral <User Schedule>    MEDICATIONS  (PRN):      Vital Signs Last 24 Hrs  T(C): 36.8 (2019 07:04), Max: 36.8 (2019 07:04)  T(F): 98.2 (2019 07:04), Max: 98.2 (2019 07:04)  HR: 69 (2019 07:04) (60 - 69)  BP: 136/82 (2019 07:04) (136/82 - 167/102)  BP(mean): --  RR: 14 (2019 07:04) (14 - 18)  SpO2: 99% (2019 07:04) (99% - 100%)  CAPILLARY BLOOD GLUCOSE        I&O's Summary      PHYSICAL EXAM:  GENERAL: NAD, well-developed  HEAD:  Atraumatic, Normocephalic  EYES: EOMI, PERRLA, conjunctiva and sclera clear  NECK: Supple, No JVD  CHEST/LUNG: Clear to auscultation bilaterally; No wheeze  HEART: Regular rate and rhythm; No murmurs, rubs, or gallops  ABDOMEN: Soft, Nontender, Nondistended; Bowel sounds present  EXTREMITIES:  2+ Peripheral Pulses, No clubbing, cyanosis, or edema  PSYCH: AAOx3  NEUROLOGY: non-focal  SKIN: No rashes or lesions    LABS:                        12.3   7.50  )-----------( 271      ( 2019 05:11 )             38.6         139  |  104  |  10  ----------------------------<  104<H>  3.8   |  24  |  0.70    Ca    9.1      2019 05:11    TPro  7.0  /  Alb  3.9  /  TBili  1.5<H>  /  DBili  x   /  AST  18  /  ALT  16  /  AlkPhos  73            Urinalysis Basic - ( 2019 15:40 )    Color: LIGHT YELLOW / Appearance: Lt TURBID / S.019 / pH: 8.0  Gluc: NEGATIVE / Ketone: NEGATIVE  / Bili: NEGATIVE / Urobili: NORMAL   Blood: NEGATIVE / Protein: 50 / Nitrite: NEGATIVE   Leuk Esterase: LARGE / RBC: 3-5 / WBC >50   Sq Epi: OCC / Non Sq Epi: x / Bacteria: NEGATIVE        RADIOLOGY & ADDITIONAL TESTS:    Imaging Personally Reviewed:    Consultant(s) Notes Reviewed:      Care Discussed with Consultants/Other Providers: Patient is a 23y old  Female who presents with a chief complaint of genital HSV (2019 07:36)    SUBJECTIVE / OVERNIGHT EVENTS:    HPI:  24 yo woman with congenital HIV,  genital HSV 2 refractory to several antivirals, treated in past with IV foscarnet x 7 weeks who returns to ER with increased pain and vulvar lesions. Currently has 2 painful lesions in the left labia.   Treated with IV foscarnet x 6 weeks April - May 2019 and again at higher dose earlier this month.  PICC was removed . Patient developed local skin reaction with blisters at PICC site.   Viral culture was sent from outpatient office for sensitivities on ; called [facility] representative who contacted reference lab, expect results in few days.  Patient was evaluated by dermatology last week- biopsy taken; results pending.  Compliant with ART. Last viral load <30 CD4 161.    She is not compliant with TMP/Sulfa or Azithromycin prophylaxis, as she does not remember to take the medications and does not think she can.    ED course: given Ketoralac x1, pain improved (2019 16:38)      MEDICATIONS  (STANDING):  acyclovir IVPB 400 milliGRAM(s) IV Intermittent every 8 hours  atazanavir 300 milliGRAM(s) Oral daily  azithromycin   Tablet 1200 milliGRAM(s) Oral <User Schedule> NOT TAKING  ceFAZolin   IVPB 1000 milliGRAM(s) IV Intermittent every 8 hours  ceFAZolin   IVPB      emtricitabine 200 mG/tenofovir alafenamide 25 mG (DESCOVY) Tablet 1 Tablet(s) Oral daily  ritonavir Tablet 100 milliGRAM(s) Oral daily  sodium chloride 0.9%. 1000 milliLiter(s) (100 mL/Hr) IV Continuous <Continuous>  trimethoprim  160 mG/sulfamethoxazole 800 mG 1 Tablet(s) Oral <User Schedule> NOT TAKING    MEDICATIONS  (PRN):      Vital Signs Last 24 Hrs  T(C): 36.8 (2019 07:04), Max: 36.8 (2019 07:04)  T(F): 98.2 (2019 07:04), Max: 98.2 (2019 07:04)  HR: 69 (2019 07:04) (60 - 69)  BP: 136/82 (2019 07:04) (136/82 - 167/102)  RR: 14 (2019 07:04) (14 - 18)  SpO2: 99% (2019 07:04) (99% - 100%)      PHYSICAL EXAM:  GENERAL: NAD, well-developed  HEAD:  Atraumatic, Normocephalic  EYES: EOMI, PERRLA, conjunctiva and sclera clear  NECK: Supple, No JVD, No palpable lymphadenopathy, Non-tender  CHEST/LUNG: Clear to auscultation bilaterally; No wheezes, rales, or stridor  HEART: Regular rate and rhythm; No murmurs, rubs, or gallops  ABDOMEN: Soft, Nontender, Nondistended; Bowel sounds present  : Purulent drainage overlying small lesions on labia.   EXTREMITIES:  2+ Peripheral Pulses, No clubbing, cyanosis, or edema  PSYCH: AAOx3  NEUROLOGY: non-focal  SKIN: ~2cm blister on left arm filled with serosanguinous fluid.     LABS:                        12.3   7.50  )-----------( 271      ( 2019 05:11 )             38.6         139  |  104  |  10  ----------------------------<  104<H>  3.8   |  24  |  0.70    Ca    9.1      2019 05:11    TPro  7.0  /  Alb  3.9  /  TBili  1.5<H>  /  DBili  x   /  AST  18  /  ALT  16  /  AlkPhos  73      Urinalysis Basic - ( 2019 15:40 )    Color: LIGHT YELLOW / Appearance: Lt TURBID / S.019 / pH: 8.0  Gluc: NEGATIVE / Ketone: NEGATIVE  / Bili: NEGATIVE / Urobili: NORMAL   Blood: NEGATIVE / Protein: 50 / Nitrite: NEGATIVE   Leuk Esterase: LARGE / RBC: 3-5 / WBC >50   Sq Epi: OCC / Non Sq Epi: x / Bacteria: NEGATIVE        RADIOLOGY & ADDITIONAL TESTS:    Imaging Personally Reviewed:    Consultant(s) Notes Reviewed:      Care Discussed with Consultants/Other Providers: Patient is a 23y old  Female who presents with a chief complaint of genital HSV (2019 07:36)    SUBJECTIVE / OVERNIGHT EVENTS:    HPI:  24 yo woman with congenital HIV,  genital HSV 2 refractory to several antivirals, treated in past with IV foscarnet x 7 weeks who returns to ER with increased pain and vulvar lesions. Currently has 2 painful lesions in the left labia.   Treated with IV foscarnet x 6 weeks April - May 2019 and again at higher dose earlier this month.  PICC was removed . Patient developed local skin reaction with blisters at PICC site.   Viral culture was sent from outpatient office for sensitivities on ; called [facility] representative who contacted reference lab, expect results in few days.  Patient was evaluated by dermatology last week- biopsy taken; results pending.  Compliant with ART. Last viral load <30 CD4 161.    She is not compliant with TMP/Sulfa or Azithromycin prophylaxis, as she does not remember to take the medications and does not think she can.    ED course: given Ketoralac x1, pain improved (2019 16:38)    CONSTITUTIONAL: No fevers or chills  EYES/ENT: No throat pain   NECK: No pain or stiffness  RESPIRATORY: No shortness of breath  CARDIOVASCULAR: No chest pain  GASTROINTESTINAL: No abdominal or epigastric pain. No nausea, vomiting, or hematemesis;   GENITOURINARY: Dysuria. No hematuria  NEUROLOGICAL: No headaches or photophobia  All other review of systems is negative unless indicated above.    MEDICATIONS  (STANDING):  acyclovir IVPB 400 milliGRAM(s) IV Intermittent every 8 hours  atazanavir 300 milliGRAM(s) Oral daily  azithromycin   Tablet 1200 milliGRAM(s) Oral <User Schedule> NOT TAKING  ceFAZolin   IVPB 1000 milliGRAM(s) IV Intermittent every 8 hours  ceFAZolin   IVPB      emtricitabine 200 mG/tenofovir alafenamide 25 mG (DESCOVY) Tablet 1 Tablet(s) Oral daily  ritonavir Tablet 100 milliGRAM(s) Oral daily  sodium chloride 0.9%. 1000 milliLiter(s) (100 mL/Hr) IV Continuous <Continuous>  trimethoprim  160 mG/sulfamethoxazole 800 mG 1 Tablet(s) Oral <User Schedule> NOT TAKING    MEDICATIONS  (PRN):      Vital Signs Last 24 Hrs  T(C): 36.8 (2019 07:04), Max: 36.8 (2019 07:04)  T(F): 98.2 (2019 07:04), Max: 98.2 (2019 07:04)  HR: 69 (2019 07:04) (60 - 69)  BP: 136/82 (2019 07:04) (136/82 - 167/102)  RR: 14 (2019 07:04) (14 - 18)  SpO2: 99% (2019 07:04) (99% - 100%)      PHYSICAL EXAM:  GENERAL: NAD, well-developed  HEAD:  Atraumatic, Normocephalic  EYES: EOMI, PERRLA, conjunctiva and sclera clear  Throat: Mucous membranes moist without exudate or erythema.  NECK: Supple, No JVD, No palpable lymphadenopathy, Non-tender  CHEST/LUNG: Clear to auscultation bilaterally; No wheezes, rales, or stridor  HEART: Regular rate and rhythm; No murmurs, rubs, or gallops  ABDOMEN: Soft, Nontender, Nondistended; Bowel sounds present  : Purulent exudate overlying small ulcers on labia.   EXTREMITIES:  2+ Peripheral Pulses, No clubbing, cyanosis, or edema  PSYCH: AAOx3  NEUROLOGY: non-focal  SKIN: ~2cm blister on left arm filled with serosanguinous fluid.     LABS:                        12.3   7.50  )-----------( 271      ( 2019 05:11 )             38.6     -    139  |  104  |  10  ----------------------------<  104<H>  3.8   |  24  |  0.70    Ca    9.1      2019 05:11    TPro  7.0  /  Alb  3.9  /  TBili  1.5<H>  /  DBili  x   /  AST  18  /  ALT  16  /  AlkPhos  73  06-    Urinalysis Basic - ( 2019 15:40 )    Color: LIGHT YELLOW / Appearance: Lt TURBID / S.019 / pH: 8.0  Gluc: NEGATIVE / Ketone: NEGATIVE  / Bili: NEGATIVE / Urobili: NORMAL   Blood: NEGATIVE / Protein: 50 / Nitrite: NEGATIVE   Leuk Esterase: LARGE / RBC: 3-5 / WBC >50   Sq Epi: OCC / Non Sq Epi: x / Bacteria: NEGATIVE    Consult notes reviewed:  Infectious disease - Concern for superinfection and/or resistance. Following up with sensitivity results and biopsy results

## 2019-06-28 LAB
ANION GAP SERPL CALC-SCNC: 10 MMO/L — SIGNIFICANT CHANGE UP (ref 7–14)
BACTERIA UR CULT: SIGNIFICANT CHANGE UP
BUN SERPL-MCNC: 6 MG/DL — LOW (ref 7–23)
C TRACH RRNA SPEC QL NAA+PROBE: SIGNIFICANT CHANGE UP
CALCIUM SERPL-MCNC: 9.3 MG/DL — SIGNIFICANT CHANGE UP (ref 8.4–10.5)
CHLORIDE SERPL-SCNC: 107 MMOL/L — SIGNIFICANT CHANGE UP (ref 98–107)
CO2 SERPL-SCNC: 23 MMOL/L — SIGNIFICANT CHANGE UP (ref 22–31)
CREAT SERPL-MCNC: 0.79 MG/DL — SIGNIFICANT CHANGE UP (ref 0.5–1.3)
GLUCOSE SERPL-MCNC: 104 MG/DL — HIGH (ref 70–99)
HCT VFR BLD CALC: 37.5 % — SIGNIFICANT CHANGE UP (ref 34.5–45)
HGB BLD-MCNC: 11.9 G/DL — SIGNIFICANT CHANGE UP (ref 11.5–15.5)
MCHC RBC-ENTMCNC: 27.3 PG — SIGNIFICANT CHANGE UP (ref 27–34)
MCHC RBC-ENTMCNC: 31.7 % — LOW (ref 32–36)
MCV RBC AUTO: 86 FL — SIGNIFICANT CHANGE UP (ref 80–100)
N GONORRHOEA RRNA SPEC QL NAA+PROBE: SIGNIFICANT CHANGE UP
NRBC # FLD: 0 K/UL — SIGNIFICANT CHANGE UP (ref 0–0)
PLATELET # BLD AUTO: 277 K/UL — SIGNIFICANT CHANGE UP (ref 150–400)
PMV BLD: 10.1 FL — SIGNIFICANT CHANGE UP (ref 7–13)
POTASSIUM SERPL-MCNC: 3.8 MMOL/L — SIGNIFICANT CHANGE UP (ref 3.5–5.3)
POTASSIUM SERPL-SCNC: 3.8 MMOL/L — SIGNIFICANT CHANGE UP (ref 3.5–5.3)
RBC # BLD: 4.36 M/UL — SIGNIFICANT CHANGE UP (ref 3.8–5.2)
RBC # FLD: 13.5 % — SIGNIFICANT CHANGE UP (ref 10.3–14.5)
SODIUM SERPL-SCNC: 140 MMOL/L — SIGNIFICANT CHANGE UP (ref 135–145)
SPECIMEN SOURCE: SIGNIFICANT CHANGE UP
WBC # BLD: 6.06 K/UL — SIGNIFICANT CHANGE UP (ref 3.8–10.5)
WBC # FLD AUTO: 6.06 K/UL — SIGNIFICANT CHANGE UP (ref 3.8–10.5)

## 2019-06-28 PROCEDURE — 99232 SBSQ HOSP IP/OBS MODERATE 35: CPT | Mod: GC

## 2019-06-28 PROCEDURE — 99232 SBSQ HOSP IP/OBS MODERATE 35: CPT

## 2019-06-28 RX ADMIN — Medication 100 MILLIGRAM(S): at 11:47

## 2019-06-28 RX ADMIN — SODIUM CHLORIDE 100 MILLILITER(S): 9 INJECTION INTRAMUSCULAR; INTRAVENOUS; SUBCUTANEOUS at 01:12

## 2019-06-28 RX ADMIN — SODIUM CHLORIDE 100 MILLILITER(S): 9 INJECTION INTRAMUSCULAR; INTRAVENOUS; SUBCUTANEOUS at 11:47

## 2019-06-28 RX ADMIN — Medication 100 MILLIGRAM(S): at 19:43

## 2019-06-28 RX ADMIN — Medication 108 MILLIGRAM(S): at 19:42

## 2019-06-28 RX ADMIN — EMTRICITABINE AND TENOFOVIR DISOPROXIL FUMARATE 1 TABLET(S): 200; 300 TABLET, FILM COATED ORAL at 11:49

## 2019-06-28 RX ADMIN — Medication 108 MILLIGRAM(S): at 01:12

## 2019-06-28 RX ADMIN — ATAZANAVIR 300 MILLIGRAM(S): 200 CAPSULE ORAL at 11:49

## 2019-06-28 RX ADMIN — Medication 100 MILLIGRAM(S): at 01:12

## 2019-06-28 RX ADMIN — RITONAVIR 100 MILLIGRAM(S): 100 TABLET, FILM COATED ORAL at 11:50

## 2019-06-28 RX ADMIN — Medication 108 MILLIGRAM(S): at 11:47

## 2019-06-28 NOTE — PROGRESS NOTE ADULT - PROBLEM SELECTOR PLAN 1
Known diagnosis in the setting of HIV. Current lesions have been present for 4 months despite treatment with multiple antivirals. Causes patient pain, controlled well with Ketorolac. Lesions have purulent exudate, which is unexpected for HSV, so consider superinfection, which could explain lack of response to antivirals.  - c/w Acyclovir 400 mg iv q 8 h, Cefazolin 1 gm IV q8 per ID recs  - afebrile, BP stable, no leukocytosis  - Ketoralac PRN for pain management, well controlled  - f/u bx result  - f/u swab results for sensitivities  - Chlamydia/gonorrhea urine test Known diagnosis in the setting of HIV. Current lesions have been present for 4 months despite treatment with multiple antivirals. Causes patient pain, controlled well with Ketorolac. Lesions have purulent exudate, which is unexpected for HSV, so consider superinfection, which could explain lack of response to antivirals.  - c/w Acyclovir 400 mg iv q 8 h, Cefazolin 1 gm IV q8 for now, pt treated w/ repeated courses of foscarnet, resistance testing was sent 6/4, results pending, ID would like to not use cidofovir  - afebrile, BP stable, no leukocytosis  - Ketoralac PRN for pain management, well controlled  - f/u bx result  - f/u swab results for sensitivities  - Chlamydia/gonorrhea urine test

## 2019-06-28 NOTE — PROGRESS NOTE ADULT - PROBLEM SELECTOR PLAN 2
- Continue home HIV medications  Rayataz 300 mg po daily, ritonovir 100 mg po daily,  Descovy 1 tab po daily  - Patient does not take bactrim DS 1 tab po 3x/ week, azithro 1200 mg po weekly on sat. Will administer per schedule while in hospital for prophylaxis  - outpt f/u with ID clinic

## 2019-06-28 NOTE — CONSULT NOTE ADULT - SUBJECTIVE AND OBJECTIVE BOX
HPI:   Patient is a 23y female with congenital HIV, prior nonadherence, now consistently on Tx with Descovy and boosted Atazanavir, VL < 30, T cell 215 as of last check; T cell sylvie extremely low as of 2 yrs ago.  She was managing genital HSV with daily Valtrex for many months, but this led to failure at least as of this past Feb.  Lesion confirmed as HSV 2 as of March.  She later received 2 courses of IV Foscarnet, last x 6 wks via PICC.  She tolerated this without renal toxicity.  While on Foscarnet, resolution in 2 upper labial lesions, but persistent of 2 smaller lesions- improved, but not resolved.  Off Foscarnet for ~2 wks, increasing pain, ulceration, prompting admission here 6/26.  She was started on IV ACV and notes no change in lesions- still with severe pain.       REVIEW OF SYSTEMS:  All other review of systems negative (Comprehensive ROS)    PAST MEDICAL & SURGICAL HISTORY:  Genital herpes  HIV (human immunodeficiency virus infection)  No significant past surgical history      Allergies    No Known Allergies    Intolerances        Antimicrobials Day #    acyclovir IVPB 400 milliGRAM(s) IV Intermittent every 8 hours  atazanavir 300 milliGRAM(s) Oral daily  azithromycin   Tablet 1200 milliGRAM(s) Oral <User Schedule>  ceFAZolin   IVPB 1000 milliGRAM(s) IV Intermittent every 8 hours  ceFAZolin   IVPB      emtricitabine 200 mG/tenofovir alafenamide 25 mG (DESCOVY) Tablet 1 Tablet(s) Oral daily  ritonavir Tablet 100 milliGRAM(s) Oral daily  trimethoprim  160 mG/sulfamethoxazole 800 mG 1 Tablet(s) Oral <User Schedule>    Other Medications:  sodium chloride 0.9%. 1000 milliLiter(s) IV Continuous <Continuous>      FAMILY HISTORY:  No pertinent family history in first degree relatives      SOCIAL HISTORY:  Smoking:     ETOH:     Drug Use:     Single     T(F): 98.4 (06-28-19 @ 05:46), Max: 98.4 (06-27-19 @ 21:36)  HR: 64 (06-28-19 @ 05:46)  BP: 138/97 (06-28-19 @ 05:46)  RR: 16 (06-28-19 @ 05:46)  SpO2: 97% (06-28-19 @ 05:46)  Wt(kg): --    PHYSICAL EXAM:  General: alert, no acute distress  Eyes:  anicteric, no conjunctival injection, no discharge  Oropharynx: no lesions or injection 	  Neck: supple, without adenopathy  Lungs: clear to auscultation  Heart: regular rate and rhythm; no murmur, rubs or gallops  Abdomen: soft, nondistended, nontender, without mass or organomegaly  Skin: no lesions  Extremities: no clubbing, cyanosis, or edema  Neurologic: alert, oriented, moves all extremities    LAB RESULTS:                        11.9   6.06  )-----------( 277      ( 28 Jun 2019 06:33 )             37.5     06-28    140  |  107  |  6<L>  ----------------------------<  104<H>  3.8   |  23  |  0.79    Ca    9.3      28 Jun 2019 06:33            MICROBIOLOGY:  RECENT CULTURES:  06-26 @ 16:16 URINE MIDSTREAM               RADIOLOGY REVIEWED: HPI:   Patient is a 23y female with congenital HIV, prior nonadherence, now consistently on Tx with Descovy and boosted Atazanavir, VL < 30, T cell 215 as of last check; T cell sylvie extremely low as of 2 yrs ago.  She was managing genital HSV with daily Valtrex for many months, but this led to failure at least as of this past Feb.  Lesion confirmed as HSV 2 as of March.  She later received 2 courses of IV Foscarnet, last x 6 wks via PICC.  She tolerated this without renal toxicity.  While on Foscarnet, resolution in 2 upper labial lesions, but persistent of 2 smaller lesions- improved, but not resolved.  She also tried topical Aldara, but mostly assoc with irritation.  Off Foscarnet for ~2 wks, increasing pain, ulceration, prompting admission here 6/26.  She was started on IV ACV and notes no change in lesions- still with severe pain.        REVIEW OF SYSTEMS:  All other review of systems negative (Comprehensive ROS)    PAST MEDICAL & SURGICAL HISTORY:  Genital herpes  HIV (human immunodeficiency virus infection)  No significant past surgical history    Allergies  No Known Allergies    Antimicrobials Day # 3   acyclovir IVPB 400 milliGRAM(s) IV Intermittent every 8 hours  atazanavir 300 milliGRAM(s) Oral daily  azithromycin   Tablet 1200 milliGRAM(s) Oral <User Schedule>  ceFAZolin   IVPB 1000 milliGRAM(s) IV Intermittent every 8 hours  ceFAZolin   IVPB      emtricitabine 200 mG/tenofovir alafenamide 25 mG (DESCOVY) Tablet 1 Tablet(s) Oral daily  ritonavir Tablet 100 milliGRAM(s) Oral daily  trimethoprim  160 mG/sulfamethoxazole 800 mG 1 Tablet(s) Oral <User Schedule>    Other Medications:  sodium chloride 0.9%. 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  Single, nursing student     T(F): 98.4 (06-28-19 @ 05:46), Max: 98.4 (06-27-19 @ 21:36)  HR: 64 (06-28-19 @ 05:46)  BP: 138/97 (06-28-19 @ 05:46)  RR: 16 (06-28-19 @ 05:46)  SpO2: 97% (06-28-19 @ 05:46)  Wt(kg): --    PHYSICAL EXAM:  General: alert, no acute distress  Eyes:  anicteric, no conjunctival injection, no discharge  Oropharynx: no lesions or injection 	  Neck: supple, without adenopathy  Lungs: clear to auscultation  Heart: regular rate and rhythm; no murmur, rubs or gallops  Abdomen: soft, nondistended, nontender, without mass or organomegaly  Gyn: not performed, declined by pt  Skin: no rash  Extremities: no clubbing, cyanosis, or edema  Neurologic: alert, oriented, moves all extremities    LAB RESULTS:                        11.9   6.06  )-----------( 277      ( 28 Jun 2019 06:33 )             37.5     06-28    140  |  107  |  6<L>  ----------------------------<  104<H>  3.8   |  23  |  0.79    Ca    9.3      28 Jun 2019 06:33      MICROBIOLOGY:  RECENT CULTURES:  Bx performed last wk inflammatory infiltrate  Specimen sent to test for presence of TK mutation, ACV resistance- pending

## 2019-06-28 NOTE — PROGRESS NOTE ADULT - ATTENDING COMMENTS
Genital herpes with possible superimposed infection, c/w IV Acyclovir and IV Ancef, obtain results of biopsy   Congenital HIV, continue ART. Genital herpes with possible superimposed infection, c/w IV Acyclovir and IV Ancef, obtain results of biopsy, 2nd ID opinion    Congenital HIV, continue ART.

## 2019-06-28 NOTE — PROGRESS NOTE ADULT - SUBJECTIVE AND OBJECTIVE BOX
Patient is a 23y old  Female who presents with a chief complaint of genital HSV (2019 12:18)      SUBJECTIVE / OVERNIGHT EVENTS:    MEDICATIONS  (STANDING):  acyclovir IVPB 400 milliGRAM(s) IV Intermittent every 8 hours  atazanavir 300 milliGRAM(s) Oral daily  azithromycin   Tablet 1200 milliGRAM(s) Oral <User Schedule>  ceFAZolin   IVPB 1000 milliGRAM(s) IV Intermittent every 8 hours  ceFAZolin   IVPB      emtricitabine 200 mG/tenofovir alafenamide 25 mG (DESCOVY) Tablet 1 Tablet(s) Oral daily  ritonavir Tablet 100 milliGRAM(s) Oral daily  sodium chloride 0.9%. 1000 milliLiter(s) (100 mL/Hr) IV Continuous <Continuous>  trimethoprim  160 mG/sulfamethoxazole 800 mG 1 Tablet(s) Oral <User Schedule>    MEDICATIONS  (PRN):      Vital Signs Last 24 Hrs  T(C): 36.9 (2019 05:46), Max: 36.9 (2019 21:36)  T(F): 98.4 (2019 05:46), Max: 98.4 (2019 21:36)  HR: 64 (2019 05:46) (60 - 69)  BP: 138/97 (2019 05:46) (130/84 - 147/98)  BP(mean): --  RR: 16 (2019 05:46) (14 - 18)  SpO2: 97% (2019 05:46) (97% - 100%)  CAPILLARY BLOOD GLUCOSE        I&O's Summary    2019 07:01  -  2019 07:00  --------------------------------------------------------  IN: 1200 mL / OUT: 0 mL / NET: 1200 mL        PHYSICAL EXAM:  Vital Signs Last 24 Hrs  T(C): 36.9 (19 @ 05:46)  T(F): 98.4 (19 @ 05:46), Max: 98.4 (19 @ 21:36)  HR: 64 (19 @ 05:46) (60 - 69)  BP: 138/97 (19 @ 05:46)  BP(mean): --  RR: 16 (19 @ 05:46) (14 - 18)  SpO2: 97% (19 @ 05:46) (97% - 100%)  Wt(kg): --     @ 07:01  -   @ 07:00  --------------------------------------------------------  IN: 1200 mL / OUT: 0 mL / NET: 1200 mL      Constitutional: NAD, awake and alert  EYES: EOMI  ENT:  Normal Hearing, no tonsillar exudates   Neck: Soft and supple , no thyromegaly   Respiratory: Breath sounds are clear bilaterally, No wheezing, rales or rhonchi  Cardiovascular: S1 and S2, regular rate and rhythm, no Murmurs, gallops or rubs, no JVD,    Gastrointestinal: Bowel Sounds present, soft, nontender, nondistended, no guarding, no rebound  Extremities: No cyanosis or clubbing; warm to touch  Vascular: 2+ peripheral pulses lower ex  Neurological: No focal deficits, CN II-XII intact bilaterally, sensation to light touch intact in all extremities, gait intact. Pupils are equally reactive to light and symmetrical in size.   Musculoskeletal: 5/5 strength b/l upper and lower extremities; no joint swelling.  Skin: No rashes  Psych: no depression or anhedonia, AAOx3  HEME: no bruises, no nose bleeds      LABS:                Urinalysis Basic - ( 2019 15:40 )    Color: LIGHT YELLOW / Appearance: Lt TURBID / S.019 / pH: 8.0  Gluc: NEGATIVE / Ketone: NEGATIVE  / Bili: NEGATIVE / Urobili: NORMAL   Blood: NEGATIVE / Protein: 50 / Nitrite: NEGATIVE   Leuk Esterase: LARGE / RBC: 3-5 / WBC >50   Sq Epi: OCC / Non Sq Epi: x / Bacteria: NEGATIVE        RADIOLOGY & ADDITIONAL TESTS:    Imaging Personally Reviewed:    Consultant(s) Notes Reviewed:      Care Discussed with Consultants/Other Providers: Patient is a 23y old  Female who presents with a chief complaint of genital HSV (2019 12:18)      SUBJECTIVE / OVERNIGHT EVENTS:    Pt seen and examined at bedside, reports no concerns overnight. Pt in mild discomfort but at baseline given pathology, no worsening pain. OOB, tolerating po, having regular bowel movements.     MEDICATIONS  (STANDING):  acyclovir IVPB 400 milliGRAM(s) IV Intermittent every 8 hours  atazanavir 300 milliGRAM(s) Oral daily  azithromycin   Tablet 1200 milliGRAM(s) Oral <User Schedule>  ceFAZolin   IVPB 1000 milliGRAM(s) IV Intermittent every 8 hours  ceFAZolin   IVPB      emtricitabine 200 mG/tenofovir alafenamide 25 mG (DESCOVY) Tablet 1 Tablet(s) Oral daily  ritonavir Tablet 100 milliGRAM(s) Oral daily  sodium chloride 0.9%. 1000 milliLiter(s) (100 mL/Hr) IV Continuous <Continuous>  trimethoprim  160 mG/sulfamethoxazole 800 mG 1 Tablet(s) Oral <User Schedule>    MEDICATIONS  (PRN):      Vital Signs Last 24 Hrs  T(C): 36.9 (2019 05:46), Max: 36.9 (2019 21:36)  T(F): 98.4 (2019 05:46), Max: 98.4 (2019 21:36)  HR: 64 (2019 05:46) (60 - 69)  BP: 138/97 (2019 05:46) (130/84 - 147/98)  BP(mean): --  RR: 16 (2019 05:46) (14 - 18)  SpO2: 97% (2019 05:46) (97% - 100%)  CAPILLARY BLOOD GLUCOSE        I&O's Summary    2019 07:01  -  2019 07:00  --------------------------------------------------------  IN: 1200 mL / OUT: 0 mL / NET: 1200 mL        PHYSICAL EXAM:  Vital Signs Last 24 Hrs  T(C): 36.9 (19 @ 05:46)  T(F): 98.4 (19 @ 05:46), Max: 98.4 (19 @ 21:36)  HR: 64 (19 @ 05:46) (60 - 69)  BP: 138/97 (19 @ 05:46)  BP(mean): --  RR: 16 (19 @ 05:46) (14 - 18)  SpO2: 97% (19 @ 05:46) (97% - 100%)  Wt(kg): --     @ 07:01  -   @ 07:00  --------------------------------------------------------  IN: 1200 mL / OUT: 0 mL / NET: 1200 mL      Constitutional: NAD, awake and alert  EYES: EOMI  ENT:  Normal Hearing, no tonsillar exudates   Neck: Soft and supple , no thyromegaly   Respiratory: Breath sounds are clear bilaterally, No wheezing, rales or rhonchi  Cardiovascular: S1 and S2, regular rate and rhythm, no Murmurs, gallops or rubs, no JVD,    Gastrointestinal: Bowel Sounds present, soft, nontender, nondistended, no guarding, no rebound  Extremities: No cyanosis or clubbing; warm to touch  Vascular: 2+ peripheral pulses lower ex  Neurological: No focal deficits, CN II-XII intact bilaterally, sensation to light touch intact in all extremities, gait intact. Pupils are equally reactive to light and symmetrical in size.   Musculoskeletal: 5/5 strength b/l upper and lower extremities; no joint swelling.  Skin: No rashes  Psych: no depression or anhedonia, AAOx3  HEME: no bruises, no nose bleeds      LABS:                Urinalysis Basic - ( 2019 15:40 )    Color: LIGHT YELLOW / Appearance: Lt TURBID / S.019 / pH: 8.0  Gluc: NEGATIVE / Ketone: NEGATIVE  / Bili: NEGATIVE / Urobili: NORMAL   Blood: NEGATIVE / Protein: 50 / Nitrite: NEGATIVE   Leuk Esterase: LARGE / RBC: 3-5 / WBC >50   Sq Epi: OCC / Non Sq Epi: x / Bacteria: NEGATIVE        RADIOLOGY & ADDITIONAL TESTS:    Imaging Personally Reviewed:    Consultant(s) Notes Reviewed:      Care Discussed with Consultants/Other Providers:

## 2019-06-28 NOTE — CONSULT NOTE ADULT - ASSESSMENT
24 yo F, congenital HIV, now on consistent ARV with Descovy and boosted Atazanavir, VLs <30 of late, Tcells on rise, now >200.  She returns with increasing labial pain in setting of confirmed HSV 2 and prior courses of Foscarnet.  She did not tolerate topical Aldara.  Suspicion for ACV resistance seems warranted here, thus, while awaiting for confirmation from specialty lab, we have to consider all options for management of ACV resistant HSV; thus far no response to IV ACV.  Options include another trial of Foscarnet, which could be coupled with topical Cidofovir, or proceeding with Cidofovir IV and then investigate topical or intralesional Cidofovir.  Recognizing toxicity with IV Cidofovir, still a viable consideration here.  Pt is aware and educated about all options, fully understands risk vs benefit profile of all alternatives.    Plan:  Continue same ARV, hopefully with increased time on effective ARV and improving T cells, better immunologic control of HSV will be seen  In interim, would support use of IV Cidofovir weekly while investigating role of topical Tx with Cidofovir as well- cream is commercially available, but compounding has also been tried (PANFILO Derm 2013: 149(7): 881-883; Journal of Investigative Med 2015 Oct-Dec; 3(4)) vs intralesional injection  I suspect this may have been reviewed with Pharmacy in past, but worth another attempt- topical Cidofovir options  Pt is reluctant to consider another course of Foscarnet; she would agree to Cidofovir use  D/w Medical team on floor  Message left for Dr Chaves, r/w ID Fellow

## 2019-06-28 NOTE — PROGRESS NOTE ADULT - SUBJECTIVE AND OBJECTIVE BOX
Follow Up:  refractory genital HSV 2,  HIV    Interval History/ROS:  no fever.  no dysuria.  feels exudates better with ancef.  ulcers about the same.  frustrated.  Remainder of ROS otherwise negative.    Allergies  No Known Allergies    MEDICATIONS  (STANDING):  acyclovir IVPB  (6/26-)      ANTIMICROBIALS:    atazanavir 300 daily  azithromycin   Tablet 1200 <User Schedule>  emtricitabine 200 mG/tenofovir alafenamide 25 mG (DESCOVY) Tablet 1 daily  ritonavir Tablet 100 daily  trimethoprim  160 mG/sulfamethoxazole 800 mG 1 <User Schedule>  acyclovir IVPB 400 every 8 hours (6/26-)  ceFAZolin   IVPB 1000 every 8 hours (6/26-)    OTHER MEDS:    sodium chloride 0.9%. 1000 milliLiter(s) IV Continuous <Continuous>    Vital Signs Last 24 Hrs  T(C): 36.8 (27 Jun 2019 07:04), Max: 36.8 (27 Jun 2019 07:04)  T(F): 98.2 (27 Jun 2019 07:04), Max: 98.2 (27 Jun 2019 07:04)  HR: 69 (27 Jun 2019 07:04) (60 - 69)  BP: 136/82 (27 Jun 2019 07:04) (136/82 - 167/102)  BP(mean): --  RR: 14 (27 Jun 2019 07:04) (14 - 18)  SpO2: 99% (27 Jun 2019 07:04) (99% - 100%)    PHYSICAL EXAM:  General:  non-toxic  HEAD/EYES:  white sclera   ENT:   supple   Cardiovascular: S1S2  Respiratory:   clear bilaterally  GI:   soft, non-tender, normal bowel sounds  :  vulvar ulcers with exudate b/l - no malodor  Musculoskeletal:   no synovitis  Neurologic:   non-focal exam   Skin:  no rash, bulla left arm from dressing around PIC  Psychiatric:   appropriate affect  alert & oriented  Lines:   no phlebitis, peripheral iv                        11.9   6.06  )-----------( 277      ( 28 Jun 2019 06:33 )             37.5 06-28    140  |  107  |  6   ----------------------------<  104  3.8   |  23  |  0.79  Ca    9.3      28 Jun 2019 06:33    T Cell Subset (06.26.19 @ 04:56)    CD4 %: 15 %    ABS CD4: 344 cell/uL    MICROBIOLOGY:  outpatient viral culture 6/4/19 sent for sensitivities    outpatient 6/19/19 - HSV-2 detected    Pathology:  Final Diagnosis  Vulva, biopsy  - Ulcerated skin with prominent mixed inflammatory infiltrate and marked reactive changes, see note    Note: Multiple immunohistochemical studies are performed to further characterize the inflammatory infiltrate, to be evaluated by the Hematopathology team in an addendum to follow.    RADIOLOGY:  n/a

## 2019-06-29 LAB
ALBUMIN SERPL ELPH-MCNC: 3.9 G/DL — SIGNIFICANT CHANGE UP (ref 3.3–5)
ALP SERPL-CCNC: 77 U/L — SIGNIFICANT CHANGE UP (ref 40–120)
ALT FLD-CCNC: 14 U/L — SIGNIFICANT CHANGE UP (ref 4–33)
ANION GAP SERPL CALC-SCNC: 10 MMO/L — SIGNIFICANT CHANGE UP (ref 7–14)
AST SERPL-CCNC: 18 U/L — SIGNIFICANT CHANGE UP (ref 4–32)
BASOPHILS # BLD AUTO: 0.02 K/UL — SIGNIFICANT CHANGE UP (ref 0–0.2)
BASOPHILS NFR BLD AUTO: 0.3 % — SIGNIFICANT CHANGE UP (ref 0–2)
BILIRUB SERPL-MCNC: 1.3 MG/DL — HIGH (ref 0.2–1.2)
BUN SERPL-MCNC: 6 MG/DL — LOW (ref 7–23)
CALCIUM SERPL-MCNC: 9.6 MG/DL — SIGNIFICANT CHANGE UP (ref 8.4–10.5)
CHLORIDE SERPL-SCNC: 104 MMOL/L — SIGNIFICANT CHANGE UP (ref 98–107)
CO2 SERPL-SCNC: 25 MMOL/L — SIGNIFICANT CHANGE UP (ref 22–31)
CREAT SERPL-MCNC: 0.68 MG/DL — SIGNIFICANT CHANGE UP (ref 0.5–1.3)
EOSINOPHIL # BLD AUTO: 0.63 K/UL — HIGH (ref 0–0.5)
EOSINOPHIL NFR BLD AUTO: 9 % — HIGH (ref 0–6)
GLUCOSE SERPL-MCNC: 82 MG/DL — SIGNIFICANT CHANGE UP (ref 70–99)
HCT VFR BLD CALC: 42.3 % — SIGNIFICANT CHANGE UP (ref 34.5–45)
HGB BLD-MCNC: 13.4 G/DL — SIGNIFICANT CHANGE UP (ref 11.5–15.5)
IMM GRANULOCYTES NFR BLD AUTO: 0.3 % — SIGNIFICANT CHANGE UP (ref 0–1.5)
LYMPHOCYTES # BLD AUTO: 2.9 K/UL — SIGNIFICANT CHANGE UP (ref 1–3.3)
LYMPHOCYTES # BLD AUTO: 41.5 % — SIGNIFICANT CHANGE UP (ref 13–44)
MCHC RBC-ENTMCNC: 27.5 PG — SIGNIFICANT CHANGE UP (ref 27–34)
MCHC RBC-ENTMCNC: 31.7 % — LOW (ref 32–36)
MCV RBC AUTO: 86.9 FL — SIGNIFICANT CHANGE UP (ref 80–100)
MONOCYTES # BLD AUTO: 0.53 K/UL — SIGNIFICANT CHANGE UP (ref 0–0.9)
MONOCYTES NFR BLD AUTO: 7.6 % — SIGNIFICANT CHANGE UP (ref 2–14)
NEUTROPHILS # BLD AUTO: 2.88 K/UL — SIGNIFICANT CHANGE UP (ref 1.8–7.4)
NEUTROPHILS NFR BLD AUTO: 41.3 % — LOW (ref 43–77)
NRBC # FLD: 0 K/UL — SIGNIFICANT CHANGE UP (ref 0–0)
PLATELET # BLD AUTO: 326 K/UL — SIGNIFICANT CHANGE UP (ref 150–400)
PMV BLD: 10.9 FL — SIGNIFICANT CHANGE UP (ref 7–13)
POTASSIUM SERPL-MCNC: 4.3 MMOL/L — SIGNIFICANT CHANGE UP (ref 3.5–5.3)
POTASSIUM SERPL-SCNC: 4.3 MMOL/L — SIGNIFICANT CHANGE UP (ref 3.5–5.3)
PROT SERPL-MCNC: 7.4 G/DL — SIGNIFICANT CHANGE UP (ref 6–8.3)
RBC # BLD: 4.87 M/UL — SIGNIFICANT CHANGE UP (ref 3.8–5.2)
RBC # FLD: 13.6 % — SIGNIFICANT CHANGE UP (ref 10.3–14.5)
SODIUM SERPL-SCNC: 139 MMOL/L — SIGNIFICANT CHANGE UP (ref 135–145)
WBC # BLD: 6.98 K/UL — SIGNIFICANT CHANGE UP (ref 3.8–10.5)
WBC # FLD AUTO: 6.98 K/UL — SIGNIFICANT CHANGE UP (ref 3.8–10.5)

## 2019-06-29 PROCEDURE — 99232 SBSQ HOSP IP/OBS MODERATE 35: CPT | Mod: GC

## 2019-06-29 PROCEDURE — 99232 SBSQ HOSP IP/OBS MODERATE 35: CPT

## 2019-06-29 RX ORDER — KETOROLAC TROMETHAMINE 30 MG/ML
15 SYRINGE (ML) INJECTION EVERY 6 HOURS
Refills: 0 | Status: DISCONTINUED | OUTPATIENT
Start: 2019-06-29 | End: 2019-07-04

## 2019-06-29 RX ADMIN — Medication 15 MILLIGRAM(S): at 07:20

## 2019-06-29 RX ADMIN — SODIUM CHLORIDE 100 MILLILITER(S): 9 INJECTION INTRAMUSCULAR; INTRAVENOUS; SUBCUTANEOUS at 17:54

## 2019-06-29 RX ADMIN — Medication 108 MILLIGRAM(S): at 02:12

## 2019-06-29 RX ADMIN — Medication 100 MILLIGRAM(S): at 09:56

## 2019-06-29 RX ADMIN — EMTRICITABINE AND TENOFOVIR DISOPROXIL FUMARATE 1 TABLET(S): 200; 300 TABLET, FILM COATED ORAL at 11:40

## 2019-06-29 RX ADMIN — Medication 100 MILLIGRAM(S): at 17:53

## 2019-06-29 RX ADMIN — Medication 100 MILLIGRAM(S): at 02:12

## 2019-06-29 RX ADMIN — ATAZANAVIR 300 MILLIGRAM(S): 200 CAPSULE ORAL at 13:00

## 2019-06-29 RX ADMIN — RITONAVIR 100 MILLIGRAM(S): 100 TABLET, FILM COATED ORAL at 11:40

## 2019-06-29 RX ADMIN — SODIUM CHLORIDE 100 MILLILITER(S): 9 INJECTION INTRAMUSCULAR; INTRAVENOUS; SUBCUTANEOUS at 02:12

## 2019-06-29 RX ADMIN — Medication 108 MILLIGRAM(S): at 10:33

## 2019-06-29 RX ADMIN — Medication 108 MILLIGRAM(S): at 18:29

## 2019-06-29 RX ADMIN — Medication 15 MILLIGRAM(S): at 07:05

## 2019-06-29 NOTE — PROGRESS NOTE ADULT - SUBJECTIVE AND OBJECTIVE BOX
Patient is a 23y old  Female who presents with a chief complaint of genital HSV (29 Jun 2019 06:46)    SUBJECTIVE / OVERNIGHT EVENTS:    No acute events overnight. Patient did experience some pain associated with HSV lesions, which improved with Ketorolac.   She believes that the exudate from the lesions is improving somewhat since starting the cefazolin.      MEDICATIONS  (STANDING):  acyclovir IVPB 400 milliGRAM(s) IV Intermittent every 8 hours  atazanavir 300 milliGRAM(s) Oral daily  azithromycin   Tablet 1200 milliGRAM(s) Oral <User Schedule>  ceFAZolin   IVPB 1000 milliGRAM(s) IV Intermittent every 8 hours  ceFAZolin   IVPB      emtricitabine 200 mG/tenofovir alafenamide 25 mG (DESCOVY) Tablet 1 Tablet(s) Oral daily  ritonavir Tablet 100 milliGRAM(s) Oral daily  sodium chloride 0.9%. 1000 milliLiter(s) (100 mL/Hr) IV Continuous <Continuous>  trimethoprim  160 mG/sulfamethoxazole 800 mG 1 Tablet(s) Oral <User Schedule>    MEDICATIONS  (PRN):  ketorolac   Injectable 15 milliGRAM(s) IV Push every 6 hours PRN Moderate Pain (4 - 6)      PHYSICAL EXAM:  Vital Signs Last 24 Hrs  T(C): 36.4 (06-28-19 @ 15:45)  T(F): 97.5 (06-28-19 @ 15:45), Max: 97.5 (06-28-19 @ 15:45)  HR: 68 (06-29-19 @ 07:03) (68 - 82)  BP: 129/93 (06-29-19 @ 07:03)  BP(mean): --  RR: 16 (06-29-19 @ 05:17) (16 - 18)  SpO2: 100% (06-29-19 @ 05:17) (100% - 100%)  Wt(kg): --    Constitutional: NAD, awake and alert  EYES: EOMI  ENT:  Normal Hearing  Respiratory: Breath sounds are clear bilaterally, No wheezing, rales or rhonchi  Cardiovascular: S1 and S2, regular rate and rhythm, no Murmurs, gallops or rubs, no JVD,    Gastrointestinal: Bowel Sounds present, soft, nontender, nondistended, no guarding, no rebound  Extremities: No cyanosis or clubbing; warm to touch  Vascular: 2+ peripheral pulses lower ex  Neurological: No focal deficits, CN II-XII intact bilaterally, sensation to light touch intact in all extremities, gait intact. Pupils are equally reactive to light and symmetrical in size.   Musculoskeletal: 5/5 strength b/l upper and lower extremities; no joint swelling.  Skin: No rashes  Psych: no depression or anhedonia, AAOx3  HEME: no bruises, no nose bleeds      LABS:                        13.4   6.98  )-----------( 326      ( 29 Jun 2019 05:25 )             42.3     06-29    139  |  104  |  6<L>  ----------------------------<  82  4.3   |  25  |  0.68    Ca    9.6      29 Jun 2019 05:25    TPro  7.4  /  Alb  3.9  /  TBili  1.3<H>  /  DBili  x   /  AST  18  /  ALT  14  /  AlkPhos  77  06-29              RADIOLOGY & ADDITIONAL TESTS:    Imaging Personally Reviewed:    Consultant(s) Notes Reviewed:      Care Discussed with Consultants/Other Providers: Patient is a 23y old  Female who presents with a chief complaint of genital HSV (29 Jun 2019 06:46)    SUBJECTIVE / OVERNIGHT EVENTS:    No acute events overnight. Patient did experience some pain associated with HSV lesions, which improved with Ketorolac.   She believes that the exudate from the lesions is improving somewhat since starting the cefazolin, but is still present. She also does not believe that the acyclovir is working and does not think that it will. Patient received results via phone with Dermatology.     MEDICATIONS  (STANDING):  acyclovir IVPB 400 milliGRAM(s) IV Intermittent every 8 hours  atazanavir 300 milliGRAM(s) Oral daily  azithromycin   Tablet 1200 milliGRAM(s) Oral <User Schedule>  ceFAZolin   IVPB 1000 milliGRAM(s) IV Intermittent every 8 hours  ceFAZolin   IVPB      emtricitabine 200 mG/tenofovir alafenamide 25 mG (DESCOVY) Tablet 1 Tablet(s) Oral daily  ritonavir Tablet 100 milliGRAM(s) Oral daily  sodium chloride 0.9%. 1000 milliLiter(s) (100 mL/Hr) IV Continuous <Continuous>  trimethoprim  160 mG/sulfamethoxazole 800 mG 1 Tablet(s) Oral <User Schedule>    MEDICATIONS  (PRN):  ketorolac   Injectable 15 milliGRAM(s) IV Push every 6 hours PRN Moderate Pain (4 - 6)    PHYSICAL EXAM:  Vital Signs Last 24 Hrs  T(C): 36.4 (06-28-19 @ 15:45)  T(F): 97.5 (06-28-19 @ 15:45), Max: 97.5 (06-28-19 @ 15:45)  HR: 68 (06-29-19 @ 07:03) (68 - 82)  BP: 129/93 (06-29-19 @ 07:03)  BP(mean): --  RR: 16 (06-29-19 @ 05:17) (16 - 18)  SpO2: 100% (06-29-19 @ 05:17) (100% - 100%)  Wt(kg): --    Constitutional: NAD, awake and alert  EYES: EOMI  ENT:  Normal Hearing  Respiratory: Breath sounds are clear bilaterally, No wheezing, rales or rhonchi  Cardiovascular: S1 and S2, regular rate and rhythm, no Murmurs, gallops or rubs, no JVD,    Gastrointestinal: Bowel Sounds present, soft, nontender, nondistended, no guarding, no rebound  Extremities: No cyanosis or clubbing; warm to touch  Vascular: 2+ peripheral pulses upper ex  Neurological: No focal deficits  Skin: No rashes. Serosanguinous filled blister on left arm.  Psych:, AAOx3  HEME: no bruises, no nose bleeds      LABS:                        13.4   6.98  )-----------( 326      ( 29 Jun 2019 05:25 )             42.3     06-29    139  |  104  |  6<L>  ----------------------------<  82  4.3   |  25  |  0.68    Ca    9.6      29 Jun 2019 05:25    TPro  7.4  /  Alb  3.9  /  TBili  1.3<H>  /  DBili  x   /  AST  18  /  ALT  14  /  AlkPhos  77  06-29      RADIOLOGY & ADDITIONAL TESTS:  Consultant(s) Notes Reviewed:    Infectious Disease Primary and Second opinions.  Second opinion posed possibility of obtaining or compounding topical Cidofovir to avoid nephrotoxicity. Discussing this with primary ID.    Care Discussed with Consultants/Other Providers:

## 2019-06-29 NOTE — PROGRESS NOTE ADULT - PROBLEM SELECTOR PLAN 1
Known diagnosis in the setting of HIV. Current lesions have been present for 4 months despite treatment with multiple antivirals. Causes patient pain, controlled well with Ketorolac. Lesions have purulent exudate, which is unexpected for HSV, so consider superinfection, which could help explain lack of response to antivirals. Chlamydia and Gonorrhea screens negative. Biopsy obtained by Dermatology apparently returned as only HSV2 lesions without superinfection.   - c/w Acyclovir 400 mg iv q 8 h, Cefazolin 1 gm IV q8 for now, pt treated w/ repeated courses of foscarnet, resistance testing was sent 6/4, results pending  - Consider topical Cidofovir, discuss w ID  - afebrile, BP stable, no leukocytosis  - Ketoralac PRN for pain management, well controlled  - f/u swab results for sensitivities  - Chlamydia/gonorrhea urine test negative

## 2019-06-29 NOTE — PROGRESS NOTE ADULT - ATTENDING COMMENTS
Genital herpes with possible superimposed infection, c/w IV Acyclovir and IV Ancef, obtain results of biopsy, will speak w/ pharmacy regarding Cidofovir cream   Congenital HIV, continue ART.

## 2019-06-29 NOTE — PROGRESS NOTE ADULT - SUBJECTIVE AND OBJECTIVE BOX
Follow Up:      Inverval History/ROS:Patient is a 23y old  Female who presents with a chief complaint of genital HSV (29 Jun 2019 06:46)    patient notes vaginal lesions decreased in size on foscarnet- now increaing in size.      Allergies    No Known Allergies    Intolerances        ANTIMICROBIALS:  acyclovir IVPB 400 every 8 hours  atazanavir 300 daily  azithromycin   Tablet 1200 <User Schedule>  ceFAZolin   IVPB 1000 every 8 hours  ceFAZolin   IVPB    emtricitabine 200 mG/tenofovir alafenamide 25 mG (DESCOVY) Tablet 1 daily  ritonavir Tablet 100 daily  trimethoprim  160 mG/sulfamethoxazole 800 mG 1 <User Schedule>      OTHER MEDS:  ketorolac   Injectable 15 milliGRAM(s) IV Push every 6 hours PRN  sodium chloride 0.9%. 1000 milliLiter(s) IV Continuous <Continuous>      Vital Signs Last 24 Hrs  T(C): 36.8 (29 Jun 2019 14:21), Max: 36.8 (29 Jun 2019 14:21)  T(F): 98.2 (29 Jun 2019 14:21), Max: 98.2 (29 Jun 2019 14:21)  HR: 74 (29 Jun 2019 14:21) (68 - 82)  BP: 135/93 (29 Jun 2019 14:21) (129/93 - 138/106)  BP(mean): --  RR: 17 (29 Jun 2019 14:21) (16 - 17)  SpO2: 100% (29 Jun 2019 14:21) (100% - 100%)    PHYSICAL EXAM:  General: [x ] non-toxic  HEAD/EYES: [ ] PERRL x[ ] white sclera [ ] icterus  ENT:  [ ] normal [x ] supple [ ] thrush [ ] pharyngeal exudate  Cardiovascular:   [ ] murmur [x ] normal [ ] PPM/AICD  Respiratory:  [ x] clear to ausculation bilaterally  GI:  [x ] soft, non-tender, normal bowel sounds  :  [ ] rutherford [ ] no CVA tenderness     Bilateral vuvlar ulcerated lesions  no visible vesicles.    Musculoskeletal:  [x ] no synovitis  Neurologic:  [ ] non-focal exam   Skin:  [x ] no rash  Lymph: [ ] no lymphadenopathy  Psychiatric:  [x ] appropriate affect [ ] alert & oriented  Lines:  [ x] no phlebitis [ ] central line                                13.4   6.98  )-----------( 326      ( 29 Jun 2019 05:25 )             42.3       06-29    139  |  104  |  6<L>  ----------------------------<  82  4.3   |  25  |  0.68    Ca    9.6      29 Jun 2019 05:25    TPro  7.4  /  Alb  3.9  /  TBili  1.3<H>  /  DBili  x   /  AST  18  /  ALT  14  /  AlkPhos  77  06-29          MICROBIOLOGY:    RADIOLOGY:

## 2019-06-30 LAB
ANION GAP SERPL CALC-SCNC: 11 MMO/L — SIGNIFICANT CHANGE UP (ref 7–14)
BUN SERPL-MCNC: 7 MG/DL — SIGNIFICANT CHANGE UP (ref 7–23)
CALCIUM SERPL-MCNC: 8.5 MG/DL — SIGNIFICANT CHANGE UP (ref 8.4–10.5)
CHLORIDE SERPL-SCNC: 109 MMOL/L — HIGH (ref 98–107)
CO2 SERPL-SCNC: 21 MMOL/L — LOW (ref 22–31)
CREAT SERPL-MCNC: 0.67 MG/DL — SIGNIFICANT CHANGE UP (ref 0.5–1.3)
GLUCOSE SERPL-MCNC: 87 MG/DL — SIGNIFICANT CHANGE UP (ref 70–99)
HCT VFR BLD CALC: 36.2 % — SIGNIFICANT CHANGE UP (ref 34.5–45)
HGB BLD-MCNC: 11.5 G/DL — SIGNIFICANT CHANGE UP (ref 11.5–15.5)
MCHC RBC-ENTMCNC: 28.2 PG — SIGNIFICANT CHANGE UP (ref 27–34)
MCHC RBC-ENTMCNC: 31.8 % — LOW (ref 32–36)
MCV RBC AUTO: 88.7 FL — SIGNIFICANT CHANGE UP (ref 80–100)
NRBC # FLD: 0 K/UL — SIGNIFICANT CHANGE UP (ref 0–0)
PLATELET # BLD AUTO: 283 K/UL — SIGNIFICANT CHANGE UP (ref 150–400)
PMV BLD: 10.5 FL — SIGNIFICANT CHANGE UP (ref 7–13)
POTASSIUM SERPL-MCNC: 3.7 MMOL/L — SIGNIFICANT CHANGE UP (ref 3.5–5.3)
POTASSIUM SERPL-SCNC: 3.7 MMOL/L — SIGNIFICANT CHANGE UP (ref 3.5–5.3)
RBC # BLD: 4.08 M/UL — SIGNIFICANT CHANGE UP (ref 3.8–5.2)
RBC # FLD: 13.5 % — SIGNIFICANT CHANGE UP (ref 10.3–14.5)
SODIUM SERPL-SCNC: 141 MMOL/L — SIGNIFICANT CHANGE UP (ref 135–145)
WBC # BLD: 5.49 K/UL — SIGNIFICANT CHANGE UP (ref 3.8–10.5)
WBC # FLD AUTO: 5.49 K/UL — SIGNIFICANT CHANGE UP (ref 3.8–10.5)

## 2019-06-30 PROCEDURE — 99232 SBSQ HOSP IP/OBS MODERATE 35: CPT | Mod: GC

## 2019-06-30 RX ADMIN — Medication 108 MILLIGRAM(S): at 18:23

## 2019-06-30 RX ADMIN — SODIUM CHLORIDE 100 MILLILITER(S): 9 INJECTION INTRAMUSCULAR; INTRAVENOUS; SUBCUTANEOUS at 17:25

## 2019-06-30 RX ADMIN — Medication 108 MILLIGRAM(S): at 01:50

## 2019-06-30 RX ADMIN — ATAZANAVIR 300 MILLIGRAM(S): 200 CAPSULE ORAL at 11:13

## 2019-06-30 RX ADMIN — Medication 100 MILLIGRAM(S): at 01:12

## 2019-06-30 RX ADMIN — Medication 100 MILLIGRAM(S): at 17:25

## 2019-06-30 RX ADMIN — Medication 100 MILLIGRAM(S): at 09:54

## 2019-06-30 RX ADMIN — Medication 108 MILLIGRAM(S): at 10:27

## 2019-06-30 RX ADMIN — EMTRICITABINE AND TENOFOVIR DISOPROXIL FUMARATE 1 TABLET(S): 200; 300 TABLET, FILM COATED ORAL at 11:13

## 2019-06-30 RX ADMIN — RITONAVIR 100 MILLIGRAM(S): 100 TABLET, FILM COATED ORAL at 11:13

## 2019-06-30 NOTE — PROGRESS NOTE ADULT - ATTENDING COMMENTS
Genital herpes with possible superimposed infection, c/w IV Acyclovir and IV Ancef, obtain results of biopsy and resistance testing, avoid Cidofovir for now  Congenital HIV, continue ART.

## 2019-06-30 NOTE — PROGRESS NOTE ADULT - PROBLEM SELECTOR PLAN 1
Known diagnosis in the setting of HIV. Current lesions have been present for 4 months despite treatment with multiple antivirals. Causes patient pain, controlled well with Ketorolac. Lesions have purulent exudate, which is unexpected for HSV, so consider superinfection, which could help explain lack of response to antivirals. Chlamydia and Gonorrhea screens negative. Biopsy obtained by Dermatology apparently returned as only HSV2 lesions without superinfection.   - c/w Acyclovir 400 mg iv q 8 h, Cefazolin 1 gm IV q8, pt treated w/ repeated courses of foscarnet in the past, resistance testing was sent 6/4, results pending  - Consider topical Cidofovir, discuss w ID  - afebrile, BP stable, no leukocytosis  - Ketoralac PRN for pain management, well controlled  - f/u swab results for sensitivities  - Chlamydia/gonorrhea urine test negative Known diagnosis in the setting of HIV. Current lesions have been present for 4 months despite treatment with multiple antivirals. Causes patient pain, controlled well with Ketorolac. Lesions have purulent exudate, which is unexpected for HSV, so consider superinfection, which could help explain lack of response to antivirals. Chlamydia and Gonorrhea screens negative. Biopsy obtained by Dermatology apparently returned as only HSV2 lesions without superinfection.   - c/w Acyclovir 400 mg iv q 8 h, Cefazolin 1 gm IV q8, pt treated w/ repeated courses of foscarnet in the past, resistance testing was sent 6/4, results pending  - Consider topical Cidofovir, discuss w ID  - afebrile, BP stable, no leukocytosis  - Ketoralac PRN for pain management, well controlled  - f/u swab results for sensitivities  - f/u final biopsy (special stains requested yesterday)  - would prefer not to use cidofovir (systemically?) Known diagnosis in the setting of HIV. Current lesions have been present for 4 months despite treatment with multiple antivirals. Causes patient pain, controlled well with Ketorolac. Lesions have purulent exudate, which is unexpected for HSV, so consider superinfection, which could help explain lack of response to antivirals. Chlamydia and Gonorrhea screens negative. Biopsy obtained by Dermatology apparently returned as only HSV2 lesions without superinfection.   - c/w Acyclovir 400 mg iv q 8 h, Cefazolin 1 gm IV q8, pt treated w/ repeated courses of foscarnet in the past, resistance testing was sent 6/4, results pending  - Consider topical Cidofovir, discuss w ID  - afebrile, BP stable, no leukocytosis  - Ketoralac PRN for pain management, well controlled  - f/u swab results for sensitivities  - f/u final biopsy (special stains requested yesterday)  - would prefer not to use cidofovir systemically  - Spoke with pharmacy regarding topical cidofovir. Option exists for off-label 1% cidofovir gel. Discuss with ID team tomorrow

## 2019-06-30 NOTE — PROGRESS NOTE ADULT - SUBJECTIVE AND OBJECTIVE BOX
Patient is a 23y old  Female who presents with a chief complaint of genital HSV (30 Jun 2019 09:06)    SUBJECTIVE / OVERNIGHT EVENTS:  No acute overnight events. One call from patient wishing to discuss Aldara cream (Imiqiuimod), which she had used previously and says helps with her symptoms. This is not on formulary. Patient feels well and slept well. She has some discomfort but does not want to take any pain medication. She says that the discharge may now be slightly lessened since being on the Ancef. She denies fever, chills, headaches, lightheadedness, nausea, vomiting, and shortness of breath. She spoke with ID yesterday and agreed to wait while on acyclovir and not start cidofovir now.     MEDICATIONS  (STANDING):  acyclovir IVPB 400 milliGRAM(s) IV Intermittent every 8 hours  atazanavir 300 milliGRAM(s) Oral daily  azithromycin   Tablet 1200 milliGRAM(s) Oral <User Schedule>  ceFAZolin   IVPB 1000 milliGRAM(s) IV Intermittent every 8 hours  emtricitabine 200 mG/tenofovir alafenamide 25 mG (DESCOVY) Tablet 1 Tablet(s) Oral daily  ritonavir Tablet 100 milliGRAM(s) Oral daily  sodium chloride 0.9%. 1000 milliLiter(s) (100 mL/Hr) IV Continuous <Continuous>  trimethoprim  160 mG/sulfamethoxazole 800 mG 1 Tablet(s) Oral <User Schedule>    MEDICATIONS  (PRN):  ketorolac   Injectable 15 milliGRAM(s) IV Push every 6 hours PRN Moderate Pain (4 - 6)      PHYSICAL EXAM:  Vital Signs Last 24 Hrs  T(C): 36.4 (06-30-19 @ 05:46)  T(F): 97.5 (06-30-19 @ 05:46), Max: 98.2 (06-29-19 @ 14:21)  HR: 67 (06-30-19 @ 05:46) (64 - 74)  BP: 128/82 (06-30-19 @ 05:46)  BP(mean): --  RR: 17 (06-30-19 @ 05:46) (17 - 17)  SpO2: 100% (06-30-19 @ 05:46) (97% - 100%)  Wt(kg): --    Constitutional: NAD, awake and alert  EYES: EOMI  ENT:  Normal Hearing  Respiratory: Breath sounds are clear bilaterally, No wheezing, rales or rhonchi  Cardiovascular: S1 and S2, regular rate and rhythm, no Murmurs, gallops or rubs, no JVD,    Gastrointestinal: Bowel Sounds present, soft, nontender, nondistended, no guarding, no rebound  Extremities: No cyanosis or clubbing; warm to touch  Vascular: 2+ peripheral pulses lower ex  Neurological: No focal deficits  Skin: No rashes. Left arm has clean dressing over site of previous blister  Psych: AAOx3  HEME: no bruises, no nose bleeds      LABS:                        11.5   5.49  )-----------( 283      ( 30 Jun 2019 05:05 )             36.2     06-30    141  |  109<H>  |  7   ----------------------------<  87  3.7   |  21<L>  |  0.67    Ca    8.5      30 Jun 2019 05:05    TPro  7.4  /  Alb  3.9  /  TBili  1.3<H>  /  DBili  x   /  AST  18  /  ALT  14  /  AlkPhos  77  06-29      RADIOLOGY & ADDITIONAL TESTS:    Imaging Personally Reviewed:    Consultant(s) Notes Reviewed:    Infectious Disease:        Care Discussed with Consultants/Other Providers: Patient is a 23y old  Female who presents with a chief complaint of genital HSV (30 Jun 2019 09:06)    SUBJECTIVE / OVERNIGHT EVENTS:  No acute overnight events. One call from patient wishing to discuss Aldara cream (Imiqiuimod), which she had used previously and says helps with her symptoms. This is not on formulary. Patient feels well and slept well. She has some discomfort but does not want to take any pain medication. She says that the discharge may now be slightly lessened since being on the Ancef. She denies fever, chills, headaches, lightheadedness, nausea, vomiting, and shortness of breath. She spoke with ID yesterday and agreed to wait while on acyclovir and not start cidofovir now.     MEDICATIONS  (STANDING):  acyclovir IVPB 400 milliGRAM(s) IV Intermittent every 8 hours  atazanavir 300 milliGRAM(s) Oral daily  azithromycin   Tablet 1200 milliGRAM(s) Oral <User Schedule>  ceFAZolin   IVPB 1000 milliGRAM(s) IV Intermittent every 8 hours  emtricitabine 200 mG/tenofovir alafenamide 25 mG (DESCOVY) Tablet 1 Tablet(s) Oral daily  ritonavir Tablet 100 milliGRAM(s) Oral daily  sodium chloride 0.9%. 1000 milliLiter(s) (100 mL/Hr) IV Continuous <Continuous>  trimethoprim  160 mG/sulfamethoxazole 800 mG 1 Tablet(s) Oral <User Schedule>    MEDICATIONS  (PRN):  ketorolac   Injectable 15 milliGRAM(s) IV Push every 6 hours PRN Moderate Pain (4 - 6)      PHYSICAL EXAM:  Vital Signs Last 24 Hrs  T(C): 36.4 (06-30-19 @ 05:46)  T(F): 97.5 (06-30-19 @ 05:46), Max: 98.2 (06-29-19 @ 14:21)  HR: 67 (06-30-19 @ 05:46) (64 - 74)  BP: 128/82 (06-30-19 @ 05:46)  BP(mean): --  RR: 17 (06-30-19 @ 05:46) (17 - 17)  SpO2: 100% (06-30-19 @ 05:46) (97% - 100%)  Wt(kg): --    Constitutional: NAD, awake and alert  EYES: EOMI  ENT:  Normal Hearing  Respiratory: Breath sounds are clear bilaterally, No wheezing, rales or rhonchi  Cardiovascular: S1 and S2, regular rate and rhythm, no Murmurs, gallops or rubs, no JVD,    Gastrointestinal: Bowel Sounds present, soft, nontender, nondistended, no guarding, no rebound  Extremities: No cyanosis or clubbing; warm to touch  Vascular: 2+ peripheral pulses lower ex  Neurological: No focal deficits  Skin: No rashes. Left arm has clean dressing over site of previous blister  Psych: AAOx3  HEME: no bruises, no nose bleeds      LABS:                        11.5   5.49  )-----------( 283      ( 30 Jun 2019 05:05 )             36.2     06-30    141  |  109<H>  |  7   ----------------------------<  87  3.7   |  21<L>  |  0.67    Ca    8.5      30 Jun 2019 05:05    TPro  7.4  /  Alb  3.9  /  TBili  1.3<H>  /  DBili  x   /  AST  18  /  ALT  14  /  AlkPhos  77  06-29      RADIOLOGY & ADDITIONAL TESTS:    Imaging Personally Reviewed:    Consultant(s) Notes Reviewed:    Infectious Disease:  - continue ancef  - f/u resistance testing (sent 6/4/19)  - f/u final biopsy (special stains requested yesterday)  - consider 2nd opinion  - would prefer not to use cidofovir  - continue ARV and prophylaxis

## 2019-07-01 LAB
ANION GAP SERPL CALC-SCNC: 10 MMO/L — SIGNIFICANT CHANGE UP (ref 7–14)
BUN SERPL-MCNC: 5 MG/DL — LOW (ref 7–23)
CALCIUM SERPL-MCNC: 9.4 MG/DL — SIGNIFICANT CHANGE UP (ref 8.4–10.5)
CHLORIDE SERPL-SCNC: 105 MMOL/L — SIGNIFICANT CHANGE UP (ref 98–107)
CO2 SERPL-SCNC: 24 MMOL/L — SIGNIFICANT CHANGE UP (ref 22–31)
CREAT SERPL-MCNC: 0.66 MG/DL — SIGNIFICANT CHANGE UP (ref 0.5–1.3)
GLUCOSE SERPL-MCNC: 85 MG/DL — SIGNIFICANT CHANGE UP (ref 70–99)
HCT VFR BLD CALC: 39.7 % — SIGNIFICANT CHANGE UP (ref 34.5–45)
HGB BLD-MCNC: 12.7 G/DL — SIGNIFICANT CHANGE UP (ref 11.5–15.5)
MCHC RBC-ENTMCNC: 27.9 PG — SIGNIFICANT CHANGE UP (ref 27–34)
MCHC RBC-ENTMCNC: 32 % — SIGNIFICANT CHANGE UP (ref 32–36)
MCV RBC AUTO: 87.3 FL — SIGNIFICANT CHANGE UP (ref 80–100)
NRBC # FLD: 0 K/UL — SIGNIFICANT CHANGE UP (ref 0–0)
PLATELET # BLD AUTO: 302 K/UL — SIGNIFICANT CHANGE UP (ref 150–400)
PMV BLD: 10.8 FL — SIGNIFICANT CHANGE UP (ref 7–13)
POTASSIUM SERPL-MCNC: 3.8 MMOL/L — SIGNIFICANT CHANGE UP (ref 3.5–5.3)
POTASSIUM SERPL-SCNC: 3.8 MMOL/L — SIGNIFICANT CHANGE UP (ref 3.5–5.3)
RBC # BLD: 4.55 M/UL — SIGNIFICANT CHANGE UP (ref 3.8–5.2)
RBC # FLD: 13.5 % — SIGNIFICANT CHANGE UP (ref 10.3–14.5)
SODIUM SERPL-SCNC: 139 MMOL/L — SIGNIFICANT CHANGE UP (ref 135–145)
WBC # BLD: 6.05 K/UL — SIGNIFICANT CHANGE UP (ref 3.8–10.5)
WBC # FLD AUTO: 6.05 K/UL — SIGNIFICANT CHANGE UP (ref 3.8–10.5)

## 2019-07-01 PROCEDURE — 99232 SBSQ HOSP IP/OBS MODERATE 35: CPT | Mod: GC

## 2019-07-01 PROCEDURE — 99232 SBSQ HOSP IP/OBS MODERATE 35: CPT

## 2019-07-01 RX ADMIN — RITONAVIR 100 MILLIGRAM(S): 100 TABLET, FILM COATED ORAL at 12:27

## 2019-07-01 RX ADMIN — Medication 100 MILLIGRAM(S): at 12:29

## 2019-07-01 RX ADMIN — SODIUM CHLORIDE 100 MILLILITER(S): 9 INJECTION INTRAMUSCULAR; INTRAVENOUS; SUBCUTANEOUS at 18:35

## 2019-07-01 RX ADMIN — SODIUM CHLORIDE 100 MILLILITER(S): 9 INJECTION INTRAMUSCULAR; INTRAVENOUS; SUBCUTANEOUS at 01:12

## 2019-07-01 RX ADMIN — Medication 100 MILLIGRAM(S): at 18:34

## 2019-07-01 RX ADMIN — Medication 108 MILLIGRAM(S): at 01:55

## 2019-07-01 RX ADMIN — Medication 100 MILLIGRAM(S): at 01:12

## 2019-07-01 RX ADMIN — EMTRICITABINE AND TENOFOVIR DISOPROXIL FUMARATE 1 TABLET(S): 200; 300 TABLET, FILM COATED ORAL at 12:28

## 2019-07-01 RX ADMIN — Medication 108 MILLIGRAM(S): at 18:35

## 2019-07-01 RX ADMIN — Medication 108 MILLIGRAM(S): at 09:32

## 2019-07-01 RX ADMIN — ATAZANAVIR 300 MILLIGRAM(S): 200 CAPSULE ORAL at 12:28

## 2019-07-01 NOTE — CONSULT NOTE ADULT - SUBJECTIVE AND OBJECTIVE BOX
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     Dermatology is consulted for further management of recalcitrant HSV infection. Patient notes that for the past 2 weeks she has had worsening ulcerations, pain, discharge. She notes 2 lesions on the labial area. She notes having multiple viral cultures done showing HSV2, as well as PCR studies. She notes multiple courses of acyclovir as well as 2 courses of foscarnet completed 2 weeks ago. She tolerated this therapy ok and noted improvement but after completion of therapy notes worsening. She had a biopsy done by Gyn at the end of June last month which showed nonspecific inflammatory changes, pending special stains and sensitivities.     Compliant with ART. Last viral load <30 CD4 215.    ED course: given Ketoralac x1, pain improved (26 Jun 2019 16:38)      PAST MEDICAL & SURGICAL HISTORY:  Genital herpes  HIV (human immunodeficiency virus infection)  No significant past surgical history      REVIEW OF SYSTEMS    General: no fevers/chills, no lethargy	    Skin/Breast: see HPI  	  Ophthalmologic: no eye pain or change in vision  	  ENMT: no dysphagia or change in hearing    Respiratory and Thorax: no SOB or cough  	  Cardiovascular: no palpitations or chest pain    Gastrointestinal: no abdominal pain or blood in stool     Genitourinary: no dysuria or frequency    Musculoskeletal: no joint pains    Neurological: no weakness, numbness , or tingling    MEDICATIONS  (STANDING):  acyclovir IVPB 400 milliGRAM(s) IV Intermittent every 8 hours  atazanavir 300 milliGRAM(s) Oral daily  azithromycin   Tablet 1200 milliGRAM(s) Oral <User Schedule>  ceFAZolin   IVPB 1000 milliGRAM(s) IV Intermittent every 8 hours  ceFAZolin   IVPB      emtricitabine 200 mG/tenofovir alafenamide 25 mG (DESCOVY) Tablet 1 Tablet(s) Oral daily  ritonavir Tablet 100 milliGRAM(s) Oral daily  sodium chloride 0.9%. 1000 milliLiter(s) (100 mL/Hr) IV Continuous <Continuous>  trimethoprim  160 mG/sulfamethoxazole 800 mG 1 Tablet(s) Oral <User Schedule>    MEDICATIONS  (PRN):  ketorolac   Injectable 15 milliGRAM(s) IV Push every 6 hours PRN Moderate Pain (4 - 6)      Allergies    No Known Allergies    Intolerances        SOCIAL HISTORY:    FAMILY HISTORY:  No pertinent family history in first degree relatives      Vital Signs Last 24 Hrs  T(C): 36.8 (01 Jul 2019 14:55), Max: 36.8 (01 Jul 2019 14:55)  T(F): 98.3 (01 Jul 2019 14:55), Max: 98.3 (01 Jul 2019 14:55)  HR: 66 (01 Jul 2019 14:55) (66 - 84)  BP: 124/84 (01 Jul 2019 14:55) (124/84 - 143/90)  BP(mean): --  RR: 18 (01 Jul 2019 14:55) (17 - 18)  SpO2: 100% (01 Jul 2019 14:55) (100% - 100%)    PHYSICAL EXAM:     The patient was alert and oriented X 3, well nourished, and in no  apparent distress.  OP showed no ulcerations  There was no visible lymphadenopathy.  Conjunctiva were non injected  There was no clubbing or edema of extremities.  The scalp, hair, face, eyebrows, lips, OP, neck, chest, back,   extremities X 4, nails were examined.  There was no hyperhidrosis or bromhidrosis.    Of note on skin exam:   the labia minora bilaterally with ulcerations with scalloped borders and overlying yellow exudative plaques     LABS:                        12.7   6.05  )-----------( 302      ( 01 Jul 2019 07:00 )             39.7     07-01    139  |  105  |  5<L>  ----------------------------<  85  3.8   |  24  |  0.66    Ca    9.4      01 Jul 2019 07:00            RADIOLOGY & ADDITIONAL STUDIES:

## 2019-07-01 NOTE — CONSULT NOTE ADULT - ASSESSMENT
#Genital HSV, chronic, in immunocompromised patient.   -Recommend wound care consultation to optimize barrier protection of the affected areas to allow for healing- chronic trauma is impacting healing. Recommend Vaseline or Triple Paste. Will need to clean the area given exudative debris which will limit the penetration of any topical therapies.   -Trial of topical cidofovir BID to the AA, advised patient that vehicle in the hospital is a gel and that this is alcohol based and would result in burning on the affected area. Advised patient that obtaining topical cidofovir as an outpatient can be difficult and if not covered is expensive out of pocket.    -Pending special stains on Bx, and sensitivities- will follow final path report   -Please call with any questions      Patient to follow up at [facility] [address] [phone] when ready for discharge.     Ibeth Monge MD   Dermatology Resident PGY-2   [phone]

## 2019-07-01 NOTE — PROGRESS NOTE ADULT - SUBJECTIVE AND OBJECTIVE BOX
Follow Up:  refractory genital HSV 2,  HIV    Interval History/ROS:  lesions a little better.  no n/v/d.  no fever. Remainder of ROS otherwise negative.    Allergies  No Known Allergies    ANTIMICROBIALS:    atazanavir 300 daily  azithromycin   Tablet 1200 <User Schedule>  emtricitabine 200 mG/tenofovir alafenamide 25 mG (DESCOVY) Tablet 1 daily  ritonavir Tablet 100 daily  trimethoprim  160 mG/sulfamethoxazole 800 mG 1 <User Schedule>  acyclovir IVPB 400 every 8 hours (6/26-)  ceFAZolin   IVPB 1000 every 8 hours (6/26-)    MEDICATIONS  (STANDING):  PRN  ketorolac   Injectable 15 milliGRAM(s) IV Push every 6 hours PRN    Vital Signs Last 24 Hrs  T(F): 98 (07-01-19 @ 06:02), Max: 98 (06-30-19 @ 14:34)  HR: 73 (07-01-19 @ 06:02)  BP: 143/90 (07-01-19 @ 06:02)  RR: 18 (07-01-19 @ 06:02)  SpO2: 100% (07-01-19 @ 06:02) (98% - 100%)  Wt(kg): --SpO2: 99% (27 Jun 2019 07:04) (99% - 100%)    PHYSICAL EXAM:  General:  non-toxic  HEAD/EYES:  white sclera   ENT:   supple   Cardiovascular: S1S2  Respiratory:   clear bilaterally  GI:   soft, non-tender, normal bowel sounds  :  vulvar ulcers with exudate b/l - no malodor  Musculoskeletal:   no synovitis  Neurologic:   non-focal exam   Skin:  no rash, bulla left arm from dressing around PIC  Psychiatric:   appropriate affect  alert & oriented  Lines:   no phlebitis, peripheral iv                            12.7   6.05  )-----------( 302      ( 01 Jul 2019 07:00 )             39.7 07-01    139  |  105  |  5   ----------------------------<  85  3.8   |  24  |  0.66  Ca    9.4      01 Jul 2019 07:00    T Cell Subset (06.26.19 @ 04:56)    CD4 %: 15 %    ABS CD4: 344 cell/uL    MICROBIOLOGY:  outpatient viral culture 6/4/19 sent for sensitivities    outpatient 6/19/19 - HSV-2 detected    Pathology:  Final Diagnosis  Vulva, biopsy  - Ulcerated skin with prominent mixed inflammatory infiltrate and marked reactive changes, see note    Note: Multiple immunohistochemical studies are performed to further characterize the inflammatory infiltrate, to be evaluated by the Hematopathology team in an addendum to follow.    RADIOLOGY:  n/a

## 2019-07-01 NOTE — PROGRESS NOTE ADULT - SUBJECTIVE AND OBJECTIVE BOX
Patient is a 23y old  Female who presents with a chief complaint of genital HSV (01 Jul 2019 10:43)    SUBJECTIVE / OVERNIGHT EVENTS:  No acute overnight events.  Patient complains of discomfort and vaginal itching this morning, but does not want ketorolac. She denies fever, chills, or other systemic symptoms. She believes the exudate has been decreasing in quantity.    MEDICATIONS  (STANDING):  acyclovir IVPB 400 milliGRAM(s) IV Intermittent every 8 hours  atazanavir 300 milliGRAM(s) Oral daily  azithromycin   Tablet 1200 milliGRAM(s) Oral <User Schedule>  ceFAZolin   IVPB 1000 milliGRAM(s) IV Intermittent every 8 hours  ceFAZolin   IVPB      emtricitabine 200 mG/tenofovir alafenamide 25 mG (DESCOVY) Tablet 1 Tablet(s) Oral daily  ritonavir Tablet 100 milliGRAM(s) Oral daily  sodium chloride 0.9%. 1000 milliLiter(s) (100 mL/Hr) IV Continuous <Continuous>  trimethoprim  160 mG/sulfamethoxazole 800 mG 1 Tablet(s) Oral <User Schedule>    MEDICATIONS  (PRN):  ketorolac Injectable 15 milliGRAM(s) IV Push every 6 hours PRN Moderate Pain (4 - 6)    PHYSICAL EXAM:  Vital Signs Last 24 Hrs  T(C): 36.7 (07-01-19 @ 06:02)  T(F): 98 (07-01-19 @ 06:02), Max: 98 (06-30-19 @ 14:34)  HR: 73 (07-01-19 @ 06:02) (73 - 84)  BP: 143/90 (07-01-19 @ 06:02)  RR: 18 (07-01-19 @ 06:02) (16 - 18)  SpO2: 100% (07-01-19 @ 06:02) (98% - 100%)      Constitutional: NAD, awake and alert  EYES: EOMI  ENT:  Normal Hearing   Respiratory: Breath sounds are clear bilaterally, No wheezing, rales or rhonchi  Cardiovascular: S1 and S2, regular rate and rhythm, no Murmurs, gallops or rubs  Gastrointestinal: Bowel Sounds present, soft, nontender, nondistended, no guarding, no rebound  Vascular: 2+ peripheral pulses upper ex  Neurological: No focal deficits  Skin: No rashes  Psych: , AAOx3  HEME: no bruises, no nose bleeds      LABS:                        12.7   6.05  )-----------( 302      ( 01 Jul 2019 07:00 )             39.7     07-01    139  |  105  |  5<L>  ----------------------------<  85  3.8   |  24  |  0.66    Ca    9.4      01 Jul 2019 07:00      RADIOLOGY & ADDITIONAL TESTS:    Consultant(s) Notes Reviewed:    Infectious Disease:    Care Discussed with Consultants/Other Providers: Patient is a 23y old  Female who presents with a chief complaint of genital HSV (01 Jul 2019 10:43)    SUBJECTIVE / OVERNIGHT EVENTS:  No acute overnight events.  Patient complains of discomfort and vaginal itching this morning, but does not want ketorolac. She denies fever, chills, or other systemic symptoms. She believes the exudate has been decreasing in quantity.    MEDICATIONS  (STANDING):  acyclovir IVPB 400 milliGRAM(s) IV Intermittent every 8 hours  atazanavir 300 milliGRAM(s) Oral daily  azithromycin   Tablet 1200 milliGRAM(s) Oral <User Schedule>  ceFAZolin   IVPB 1000 milliGRAM(s) IV Intermittent every 8 hours  ceFAZolin   IVPB      emtricitabine 200 mG/tenofovir alafenamide 25 mG (DESCOVY) Tablet 1 Tablet(s) Oral daily  ritonavir Tablet 100 milliGRAM(s) Oral daily  sodium chloride 0.9%. 1000 milliLiter(s) (100 mL/Hr) IV Continuous <Continuous>  trimethoprim  160 mG/sulfamethoxazole 800 mG 1 Tablet(s) Oral <User Schedule>    MEDICATIONS  (PRN):  ketorolac Injectable 15 milliGRAM(s) IV Push every 6 hours PRN Moderate Pain (4 - 6)    PHYSICAL EXAM:  Vital Signs Last 24 Hrs  T(C): 36.7 (07-01-19 @ 06:02)  T(F): 98 (07-01-19 @ 06:02), Max: 98 (06-30-19 @ 14:34)  HR: 73 (07-01-19 @ 06:02) (73 - 84)  BP: 143/90 (07-01-19 @ 06:02)  RR: 18 (07-01-19 @ 06:02) (16 - 18)  SpO2: 100% (07-01-19 @ 06:02) (98% - 100%)      Constitutional: NAD, awake and alert  EYES: EOMI  ENT:  Normal Hearing   Respiratory: Breath sounds are clear bilaterally, No wheezing, rales or rhonchi  Cardiovascular: S1 and S2, regular rate and rhythm, no Murmurs, gallops or rubs  Gastrointestinal: Bowel Sounds present, soft, nontender, nondistended, no guarding, no rebound  Vascular: 2+ peripheral pulses upper ex  Neurological: No focal deficits  Skin: No rashes  Psych: AAOx3  HEME: no bruises, no nose bleeds      LABS:                        12.7   6.05  )-----------( 302      ( 01 Jul 2019 07:00 )             39.7     07-01    139  |  105  |  5<L>  ----------------------------<  85  3.8   |  24  |  0.66    Ca    9.4      01 Jul 2019 07:00      RADIOLOGY & ADDITIONAL TESTS:    Biopsy Results:  Pathology:  Final Diagnosis  Vulva, biopsy  - Ulcerated skin with prominent mixed inflammatory infiltrate and marked reactive changes, see note    Note: Multiple immunohistochemical studies are performed to further characterize the inflammatory infiltrate, to be evaluated by the Hematopathology team in an addendum to follow.    RADIOLOGY:  n/a    Consultant(s) Notes Reviewed:    Infectious Disease: Believe lesions look ulcerative, consider alternative to HSV as causative agent?  - Continue Ancef  - F/u resistance testing (sent 6/4/19)  - F/u biopsy - special stains requested yesterday  - Consider cidofovir depending on resistance and path  - Continue ARV and prophylaxis    Care Discussed with Consultants/Other Providers:  - Dermatology, will see patient Patient is a 23y old  Female who presents with a chief complaint of genital HSV (01 Jul 2019 10:43)    SUBJECTIVE / OVERNIGHT EVENTS:  No acute overnight events.  Patient complains of discomfort and vaginal itching this morning, but does not want ketorolac. She denies fever, chills, or other systemic symptoms. She believes the exudate has been decreasing in quantity.    MEDICATIONS  (STANDING):  acyclovir IVPB 400 milliGRAM(s) IV Intermittent every 8 hours  atazanavir 300 milliGRAM(s) Oral daily  azithromycin   Tablet 1200 milliGRAM(s) Oral <User Schedule>  ceFAZolin   IVPB 1000 milliGRAM(s) IV Intermittent every 8 hours  ceFAZolin   IVPB      emtricitabine 200 mG/tenofovir alafenamide 25 mG (DESCOVY) Tablet 1 Tablet(s) Oral daily  ritonavir Tablet 100 milliGRAM(s) Oral daily  sodium chloride 0.9%. 1000 milliLiter(s) (100 mL/Hr) IV Continuous <Continuous>  trimethoprim  160 mG/sulfamethoxazole 800 mG 1 Tablet(s) Oral <User Schedule>    MEDICATIONS  (PRN):  ketorolac Injectable 15 milliGRAM(s) IV Push every 6 hours PRN Moderate Pain (4 - 6)    PHYSICAL EXAM:  Vital Signs Last 24 Hrs  T(C): 36.7 (07-01-19 @ 06:02)  T(F): 98 (07-01-19 @ 06:02), Max: 98 (06-30-19 @ 14:34)  HR: 73 (07-01-19 @ 06:02) (73 - 84)  BP: 143/90 (07-01-19 @ 06:02)  RR: 18 (07-01-19 @ 06:02) (16 - 18)  SpO2: 100% (07-01-19 @ 06:02) (98% - 100%)      Constitutional: NAD, awake and alert  EYES: EOMI  ENT:  Normal Hearing   Respiratory: Breath sounds are clear bilaterally, No wheezing, rales or rhonchi  Cardiovascular: S1 and S2, regular rate and rhythm, no Murmurs, gallops or rubs  Gastrointestinal: Bowel Sounds present, soft, nontender, nondistended, no guarding, no rebound  Vascular: 2+ peripheral pulses upper ex  Neurological: No focal deficits  Psych: AAOx3  Skin: Patient declines dermatologic exam, states too many doctors have already evaluated her rash and that it's a "sensitive" area  HEME: no bruises, no nose bleeds      LABS:                        12.7   6.05  )-----------( 302      ( 01 Jul 2019 07:00 )             39.7     07-01    139  |  105  |  5<L>  ----------------------------<  85  3.8   |  24  |  0.66    Ca    9.4      01 Jul 2019 07:00      RADIOLOGY & ADDITIONAL TESTS:    Biopsy Results:  Pathology:  Final Diagnosis  Vulva, biopsy  - Ulcerated skin with prominent mixed inflammatory infiltrate and marked reactive changes, see note    Note: Multiple immunohistochemical studies are performed to further characterize the inflammatory infiltrate, to be evaluated by the Hematopathology team in an addendum to follow.    RADIOLOGY:  n/a    Consultant(s) Notes Reviewed:    Infectious Disease: Believe lesions look ulcerative, consider alternative to HSV as causative agent?  - Continue Ancef  - F/u resistance testing (sent 6/4/19)  - F/u biopsy - special stains requested yesterday  - Consider cidofovir depending on resistance and path  - Continue ARV and prophylaxis    Care Discussed with Consultants/Other Providers:  - Dermatology, will see patient

## 2019-07-02 LAB
MISCELLANEOUS TEST: NORMAL
PROC NAME: NORMAL

## 2019-07-02 PROCEDURE — 99232 SBSQ HOSP IP/OBS MODERATE 35: CPT | Mod: GC

## 2019-07-02 PROCEDURE — 99232 SBSQ HOSP IP/OBS MODERATE 35: CPT

## 2019-07-02 RX ADMIN — Medication 108 MILLIGRAM(S): at 17:19

## 2019-07-02 RX ADMIN — RITONAVIR 100 MILLIGRAM(S): 100 TABLET, FILM COATED ORAL at 12:56

## 2019-07-02 RX ADMIN — Medication 108 MILLIGRAM(S): at 09:37

## 2019-07-02 RX ADMIN — ATAZANAVIR 300 MILLIGRAM(S): 200 CAPSULE ORAL at 12:56

## 2019-07-02 RX ADMIN — Medication 108 MILLIGRAM(S): at 01:25

## 2019-07-02 RX ADMIN — Medication 100 MILLIGRAM(S): at 01:25

## 2019-07-02 RX ADMIN — SODIUM CHLORIDE 100 MILLILITER(S): 9 INJECTION INTRAMUSCULAR; INTRAVENOUS; SUBCUTANEOUS at 01:25

## 2019-07-02 RX ADMIN — Medication 100 MILLIGRAM(S): at 09:37

## 2019-07-02 RX ADMIN — EMTRICITABINE AND TENOFOVIR DISOPROXIL FUMARATE 1 TABLET(S): 200; 300 TABLET, FILM COATED ORAL at 12:56

## 2019-07-02 RX ADMIN — Medication 100 MILLIGRAM(S): at 17:19

## 2019-07-02 NOTE — MEDICAL STUDENT PROGRESS NOTE(EDUCATION) - SUBJECTIVE AND OBJECTIVE BOX
HPI:   Patient is a 23y female with congenital HIV, prior nonadherence, now consistently on Tx with Descovy and boosted Atazanavir, VL < 30, T cell 215 as of last check; T cell sylvie extremely low as of 2 yrs ago.  She was managing genital HSV with daily Valtrex for many months, but this led to failure at least as of this past Feb.  Lesion confirmed as HSV 2 as of March.  She later received 2 courses of IV Foscarnet, last x 6 wks via PICC.  She tolerated this without renal toxicity.  While on Foscarnet, resolution in 2 upper labial lesions, but persistent of 2 smaller lesions- improved, but not resolved.  She also tried topical Aldara, but mostly assoc with irritation.  Off Foscarnet for ~2 wks, increasing pain, ulceration, prompting admission here 6/26.  She was started on IV ACV and notes no change in lesions- still with severe pain.        REVIEW OF SYSTEMS:  All other review of systems negative (Comprehensive ROS)    PAST MEDICAL & SURGICAL HISTORY:  Genital herpes  HIV (human immunodeficiency virus infection)  No significant past surgical history    Allergies  No Known Allergies    Antimicrobials Day # 3   acyclovir IVPB 400 milliGRAM(s) IV Intermittent every 8 hours  atazanavir 300 milliGRAM(s) Oral daily  azithromycin   Tablet 1200 milliGRAM(s) Oral <User Schedule>  ceFAZolin   IVPB 1000 milliGRAM(s) IV Intermittent every 8 hours  ceFAZolin   IVPB      emtricitabine 200 mG/tenofovir alafenamide 25 mG (DESCOVY) Tablet 1 Tablet(s) Oral daily  ritonavir Tablet 100 milliGRAM(s) Oral daily  trimethoprim  160 mG/sulfamethoxazole 800 mG 1 Tablet(s) Oral <User Schedule>    Other Medications:  sodium chloride 0.9%. 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  Single, nursing student     T(F): 98.4 (06-28-19 @ 05:46), Max: 98.4 (06-27-19 @ 21:36)  HR: 64 (06-28-19 @ 05:46)  BP: 138/97 (06-28-19 @ 05:46)  RR: 16 (06-28-19 @ 05:46)  SpO2: 97% (06-28-19 @ 05:46)  Wt(kg): --    PHYSICAL EXAM:  General: alert, no acute distress  Eyes:  anicteric, no conjunctival injection, no discharge  Oropharynx: no lesions or injection 	  Neck: supple, without adenopathy  Lungs: clear to auscultation  Heart: regular rate and rhythm; no murmur, rubs or gallops  Abdomen: soft, nondistended, nontender, without mass or organomegaly  Gyn: not performed, declined by pt  Skin: no rash  Extremities: no clubbing, cyanosis, or edema  Neurologic: alert, oriented, moves all extremities    LAB RESULTS:                        11.9   6.06  )-----------( 277      ( 28 Jun 2019 06:33 )             37.5     06-28    140  |  107  |  6<L>  ----------------------------<  104<H>  3.8   |  23  |  0.79    Ca    9.3      28 Jun 2019 06:33      MICROBIOLOGY:  RECENT CULTURES:  Bx performed last wk inflammatory infiltrate  Specimen sent to test for presence of TK mutation, ACV resistance- pending Patient is a 23y old  Female who presents with a chief complaint of genital HSV (01 Jul 2019 15:52)    SUBJECTIVE / OVERNIGHT EVENTS: none; no pain reported, no change in bowel movements or urinary habits.    MEDICATIONS  (STANDING):  acyclovir IVPB 400 milliGRAM(s) IV Intermittent every 8 hours  atazanavir 300 milliGRAM(s) Oral daily  azithromycin   Tablet 1200 milliGRAM(s) Oral <User Schedule>  ceFAZolin   IVPB 1000 milliGRAM(s) IV Intermittent every 8 hours  emtricitabine 200 mG/tenofovir alafenamide 25 mG (DESCOVY) Tablet 1 Tablet(s) Oral daily  ritonavir Tablet 100 milliGRAM(s) Oral daily  sodium chloride 0.9%. 1000 milliLiter(s) (100 mL/Hr) IV Continuous <Continuous>  trimethoprim  160 mG/sulfamethoxazole 800 mG 1 Tablet(s) Oral <User Schedule>    MEDICATIONS  (PRN):  ketorolac   Injectable 15 milliGRAM(s) IV Push every 6 hours PRN Moderate Pain (4 - 6)    PHYSICAL EXAM:  Vital Signs Last 24 Hrs  T(C): 36.2 (07-02-19 @ 07:14)  T(F): 97.2 (07-02-19 @ 07:14), Max: 98.3 (07-01-19 @ 14:55)  HR: 80 (07-02-19 @ 07:14) (63 - 80)  BP: 133/88 (07-02-19 @ 07:14)  RR: 16 (07-02-19 @ 07:14) (16 - 18)  SpO2: 100% (07-02-19 @ 07:14) (100% - 100%)    Constitutional: NAD, awake and alert  EYES: EOMI  ENT:  Normal Hearing, no tonsillar exudates   Neck: Soft and supple , no thyromegaly   Respiratory: Breath sounds are clear bilaterally, No wheezing, rales or rhonchi  Cardiovascular: S1 and S2, regular rate and rhythm, no Murmurs, gallops or rubs, no JVD,    Gastrointestinal: Bowel Sounds present, soft, nontender, nondistended, no guarding, no rebound  Extremities: No cyanosis or clubbing; warm to touch  Vascular: 2+ peripheral pulses lower ex  Neurological: No focal deficits, CN II-XII intact bilaterally, sensation to light touch intact in all extremities, gait intact. Pupils are equally reactive to light and symmetrical in size.   Musculoskeletal: 5/5 strength b/l upper and lower extremities; no joint swelling.  Skin: No rashes  Psych: no depression or anhedonia, AAOx3  HEME: no bruises, no nose bleeds      LABS: (no new labs drawn this AM)                        12.7   6.05  )-----------( 302      ( 01 Jul 2019 07:00 )             39.7     07-01    139  |  105  |  5<L>  ----------------------------<  85  3.8   |  24  |  0.66    Ca    9.4      01 Jul 2019 07:00

## 2019-07-02 NOTE — PROGRESS NOTE ADULT - SUBJECTIVE AND OBJECTIVE BOX
Follow Up:  refractory genital HSV 2,  HIV    Interval History/ROS:  did not want to show me the lesions today.  saw derm yesterday who is assisting with cidofovir topical compound formulation. no n/v/d.  no fever. Remainder of ROS otherwise negative.    Allergies  No Known Allergies    ANTIMICROBIALS:    atazanavir 300 daily  azithromycin   Tablet 1200 <User Schedule>  emtricitabine 200 mG/tenofovir alafenamide 25 mG (DESCOVY) Tablet 1 daily  ritonavir Tablet 100 daily  trimethoprim  160 mG/sulfamethoxazole 800 mG 1 <User Schedule>  acyclovir IVPB 400 every 8 hours (6/26-)  ceFAZolin   IVPB 1000 every 8 hours (6/26-)    MEDICATIONS  (STANDING):  PRN  ketorolac   Injectable 15 milliGRAM(s) IV Push every 6 hours PRN    Vital Signs Last 24 Hrs  T(F): 97.2 (07-02-19 @ 07:14), Max: 98.3 (07-01-19 @ 14:55)  HR: 80 (07-02-19 @ 07:14)  BP: 133/88 (07-02-19 @ 07:14)  RR: 16 (07-02-19 @ 07:14)  SpO2: 100% (07-02-19 @ 07:14) (100% - 100%)    PHYSICAL EXAM:  General:  non-toxic  HEAD/EYES:  white sclera   ENT:   supple   Cardiovascular: S1S2  Respiratory:   clear bilaterally  GI:   soft, non-tender, normal bowel sounds  :  did not see today  Musculoskeletal:   no synovitis  Neurologic:   non-focal exam   Skin:  RUE bandage c/d/i  Psychiatric:   appropriate affect  Lines:   no phlebitis, peripheral iv                               12.7   6.05  )-----------( 302      ( 01 Jul 2019 07:00 )             39.7 07-01    139  |  105  |  5   ----------------------------<  85  3.8   |  24  |  0.66  Ca    9.4      01 Jul 2019 07:00    T Cell Subset (06.26.19 @ 04:56)    CD4 %: 15 %    ABS CD4: 344 cell/uL    MICROBIOLOGY:  outpatient viral culture 6/4/19 sent for sensitivities   - reportedly resulted but not yet available    outpatient 6/19/19 - HSV-2 detected    Pathology:  Final Diagnosis  Vulva, biopsy  - Ulcerated skin with prominent mixed inflammatory infiltrate and marked reactive changes, see note    Note: Multiple immunohistochemical studies are performed to further characterize the inflammatory infiltrate, to be evaluated by the Hematopathology team in an addendum to follow.    RADIOLOGY:  n/a Script called into pharmacy.   Referral place  Left message for daughter

## 2019-07-02 NOTE — CHART NOTE - NSCHARTNOTEFT_GEN_A_CORE
INTERVAL HPI/OVERNIGHT EVENTS:    MEDICATIONS  (STANDING):  acyclovir IVPB 400 milliGRAM(s) IV Intermittent every 8 hours  atazanavir 300 milliGRAM(s) Oral daily  azithromycin   Tablet 1200 milliGRAM(s) Oral <User Schedule>  ceFAZolin   IVPB 1000 milliGRAM(s) IV Intermittent every 8 hours  ceFAZolin   IVPB      emtricitabine 200 mG/tenofovir alafenamide 25 mG (DESCOVY) Tablet 1 Tablet(s) Oral daily  ritonavir Tablet 100 milliGRAM(s) Oral daily  sodium chloride 0.9%. 1000 milliLiter(s) (100 mL/Hr) IV Continuous <Continuous>  trimethoprim  160 mG/sulfamethoxazole 800 mG 1 Tablet(s) Oral <User Schedule>    MEDICATIONS  (PRN):  ketorolac   Injectable 15 milliGRAM(s) IV Push every 6 hours PRN Moderate Pain (4 - 6)      Allergies    No Known Allergies    Intolerances        REVIEW OF SYSTEMS      General: no fevers/chills, no NS	    Skin: see HPI  	  Ophthalmologic: no eye pain or change in vision    Genitourinary: no dysuria or hematuria    Musculoskeletal: no joint pains or weakness	    Neurological:no weakness or tingling          Vital Signs Last 24 Hrs  T(C): 37.3 (02 Jul 2019 13:47), Max: 37.3 (02 Jul 2019 13:47)  T(F): 99.2 (02 Jul 2019 13:47), Max: 99.2 (02 Jul 2019 13:47)  HR: 71 (02 Jul 2019 13:47) (63 - 80)  BP: 140/106 (02 Jul 2019 13:47) (133/88 - 140/106)  BP(mean): --  RR: 17 (02 Jul 2019 13:47) (16 - 18)  SpO2: 100% (02 Jul 2019 13:47) (100% - 100%)    PHYSICAL EXAM:   The patient was alert and oriented X 3, well nourished, and in no  apparent distress.  There was no visible lymphadenopathy.  Conjunctiva were non injected  There was no clubbing or edema of extremities.  There was no hyperhidrosis or bromhidrosis.    Of note on skin exam:       LABS:                        12.7   6.05  )-----------( 302      ( 01 Jul 2019 07:00 )             39.7     07-01    139  |  105  |  5<L>  ----------------------------<  85  3.8   |  24  |  0.66    Ca    9.4      01 Jul 2019 07:00            RADIOLOGY & ADDITIONAL TESTS:    #Genital HSV, chronic, in immunocompromised patient. Acyclovir Resistant   -Recommend wound care consultation to optimize barrier protection of the affected areas to allow for healing- chronic trauma is impacting healing. Recommend Vaseline or Triple Paste. Will need to clean the area given exudative debris which will limit the penetration of any topical therapies, may need chemical debridement. Will defer to wound care team    -Trial of topical cidofovir BID to the AA. 1%-3% formulation, preferably in cream or ointment vehicle. Gel is acceptable but will cause burning and discomfort, if using gel vehicle please apply with some vaseline to minimize discomfort and promote retention of medication in the area. Apply to the base of the lesions and apply non-stick telfa dressing. Ensure that one application of medication is applied at bedtime so that can allow for more undisturbed time in which the dressing and medication can remain intact.      -Pending special stains on Bx, and sensitivities- will follow final path report   -Please call with any questions      Patient to follow up at [facility] [address] [phone] when ready for discharge.     Ibeth Monge MD   Dermatology Resident PGY-2   [phone] INTERVAL HPI/OVERNIGHT EVENTS:  -patient doing well, still on acyclovir and IVF   -has not yet seen wound care and not started topical cidofovir yet   -no fevers or chills     MEDICATIONS  (STANDING):  acyclovir IVPB 400 milliGRAM(s) IV Intermittent every 8 hours  atazanavir 300 milliGRAM(s) Oral daily  azithromycin   Tablet 1200 milliGRAM(s) Oral <User Schedule>  ceFAZolin   IVPB 1000 milliGRAM(s) IV Intermittent every 8 hours  ceFAZolin   IVPB      emtricitabine 200 mG/tenofovir alafenamide 25 mG (DESCOVY) Tablet 1 Tablet(s) Oral daily  ritonavir Tablet 100 milliGRAM(s) Oral daily  sodium chloride 0.9%. 1000 milliLiter(s) (100 mL/Hr) IV Continuous <Continuous>  trimethoprim  160 mG/sulfamethoxazole 800 mG 1 Tablet(s) Oral <User Schedule>    MEDICATIONS  (PRN):  ketorolac   Injectable 15 milliGRAM(s) IV Push every 6 hours PRN Moderate Pain (4 - 6)      Allergies    No Known Allergies    Intolerances        REVIEW OF SYSTEMS      General: no fevers/chills, no NS	    Skin: see HPI  	  Ophthalmologic: no eye pain or change in vision    Genitourinary: no dysuria or hematuria    Musculoskeletal: no joint pains or weakness	    Neurological:no weakness or tingling          Vital Signs Last 24 Hrs  T(C): 37.3 (02 Jul 2019 13:47), Max: 37.3 (02 Jul 2019 13:47)  T(F): 99.2 (02 Jul 2019 13:47), Max: 99.2 (02 Jul 2019 13:47)  HR: 71 (02 Jul 2019 13:47) (63 - 80)  BP: 140/106 (02 Jul 2019 13:47) (133/88 - 140/106)  BP(mean): --  RR: 17 (02 Jul 2019 13:47) (16 - 18)  SpO2: 100% (02 Jul 2019 13:47) (100% - 100%)    PHYSICAL EXAM:   The patient was alert and oriented X 3, well nourished, and in no  apparent distress.  There was no visible lymphadenopathy.  Conjunctiva were non injected  There was no clubbing or edema of extremities.  There was no hyperhidrosis or bromhidrosis.    Of note on skin exam:   the labia minora with confluent ulcerations with overlying yellow exudative debris     LABS:                        12.7   6.05  )-----------( 302      ( 01 Jul 2019 07:00 )             39.7     07-01    139  |  105  |  5<L>  ----------------------------<  85  3.8   |  24  |  0.66    Ca    9.4      01 Jul 2019 07:00            RADIOLOGY & ADDITIONAL TESTS:    #Genital HSV, chronic, in immunocompromised patient. Acyclovir Resistant   -Recommend wound care consultation to optimize barrier protection of the affected areas to allow for healing- chronic trauma is impacting healing. Recommend Vaseline or Triple Paste. Will need to clean the area given exudative debris over ulcer base which will limit the penetration of any topical therapies, may need chemical debridement. Will defer to wound care team    -Trial of topical cidofovir BID to the AA. 1%-3% formulation, preferably in cream or ointment vehicle. Gel is acceptable but will cause burning and discomfort, if using gel vehicle please apply with some vaseline to minimize discomfort and promote retention of medication in the area. Apply to the base of the lesions and apply non-stick telfa dressing. Ensure that one application of medication is applied at bedtime so that can allow for more undisturbed time in which the dressing and medication can remain intact.      -Please provide patient with enough of the topical cidofovir upon discharge to allow time to be able to obtain this as an outpatient and so that patient may be able to complete a 3-4 week treatment course of it.   -Would have ID weigh in on the possibility of CMV infection- biopsy alone is not enough to rule out disease as it is not very sensitive. May need to consider culture or PCR to assess if this has not already been done.    -Pending special stains on Bx, and sensitivities- will follow final path report   -Please call with any questions      Above discussed with attending Dr. Luc Osorio     Patient to follow up at [facility] [address] [place] [address] [address] [phone] when ready for discharge.      Ibeth Monge MD   Dermatology Resident PGY-2   [phone]

## 2019-07-02 NOTE — PROGRESS NOTE ADULT - SUBJECTIVE AND OBJECTIVE BOX
Patient is a 23y old  Female who presents with a chief complaint of genital HSV (01 Jul 2019 15:52)    SUBJECTIVE / OVERNIGHT EVENTS:    MEDICATIONS  (STANDING):  acyclovir IVPB 400 milliGRAM(s) IV Intermittent every 8 hours  atazanavir 300 milliGRAM(s) Oral daily  azithromycin   Tablet 1200 milliGRAM(s) Oral <User Schedule>  ceFAZolin   IVPB 1000 milliGRAM(s) IV Intermittent every 8 hours  ceFAZolin   IVPB      emtricitabine 200 mG/tenofovir alafenamide 25 mG (DESCOVY) Tablet 1 Tablet(s) Oral daily  ritonavir Tablet 100 milliGRAM(s) Oral daily  sodium chloride 0.9%. 1000 milliLiter(s) (100 mL/Hr) IV Continuous <Continuous>  trimethoprim  160 mG/sulfamethoxazole 800 mG 1 Tablet(s) Oral <User Schedule>    MEDICATIONS  (PRN):  ketorolac   Injectable 15 milliGRAM(s) IV Push every 6 hours PRN Moderate Pain (4 - 6)      CAPILLARY BLOOD GLUCOSE        I&O's Summary      PHYSICAL EXAM:  Vital Signs Last 24 Hrs  T(C): 36.2 (07-02-19 @ 07:14)  T(F): 97.2 (07-02-19 @ 07:14), Max: 98.3 (07-01-19 @ 14:55)  HR: 80 (07-02-19 @ 07:14) (63 - 80)  BP: 133/88 (07-02-19 @ 07:14)  BP(mean): --  RR: 16 (07-02-19 @ 07:14) (16 - 18)  SpO2: 100% (07-02-19 @ 07:14) (100% - 100%)  Wt(kg): --    Constitutional: NAD, awake and alert  EYES: EOMI  ENT:  Normal Hearing, no tonsillar exudates   Neck: Soft and supple , no thyromegaly   Respiratory: Breath sounds are clear bilaterally, No wheezing, rales or rhonchi  Cardiovascular: S1 and S2, regular rate and rhythm, no Murmurs, gallops or rubs, no JVD,    Gastrointestinal: Bowel Sounds present, soft, nontender, nondistended, no guarding, no rebound  Extremities: No cyanosis or clubbing; warm to touch  Vascular: 2+ peripheral pulses lower ex  Neurological: No focal deficits, CN II-XII intact bilaterally, sensation to light touch intact in all extremities, gait intact. Pupils are equally reactive to light and symmetrical in size.   Musculoskeletal: 5/5 strength b/l upper and lower extremities; no joint swelling.  Skin: No rashes  Psych: no depression or anhedonia, AAOx3  HEME: no bruises, no nose bleeds      LABS:                        12.7   6.05  )-----------( 302      ( 01 Jul 2019 07:00 )             39.7     07-01    139  |  105  |  5<L>  ----------------------------<  85  3.8   |  24  |  0.66    Ca    9.4      01 Jul 2019 07:00                RADIOLOGY & ADDITIONAL TESTS:    Imaging Personally Reviewed:    Consultant(s) Notes Reviewed:      Care Discussed with Consultants/Other Providers: Patient is a 23y old  Female who presents with a chief complaint of genital HSV (02 Jul 2019 11:33)      SUBJECTIVE / OVERNIGHT EVENTS:  No acute overnight events. Patient has no complaints of pain. She does continue to feel some mild discomfort and itching around her vagina.   She denies fevers, chills, abdominal pain, nausea, vomiting    MEDICATIONS  (STANDING):  acyclovir IVPB 400 milliGRAM(s) IV Intermittent every 8 hours  atazanavir 300 milliGRAM(s) Oral daily  azithromycin   Tablet 1200 milliGRAM(s) Oral <User Schedule>  ceFAZolin   IVPB 1000 milliGRAM(s) IV Intermittent every 8 hours  ceFAZolin   IVPB      emtricitabine 200 mG/tenofovir alafenamide 25 mG (DESCOVY) Tablet 1 Tablet(s) Oral daily  ritonavir Tablet 100 milliGRAM(s) Oral daily  trimethoprim  160 mG/sulfamethoxazole 800 mG 1 Tablet(s) Oral <User Schedule>    MEDICATIONS  (PRN):  ketorolac   Injectable 15 milliGRAM(s) IV Push every 6 hours PRN Moderate Pain (4 - 6)    PHYSICAL EXAM:  Vital Signs Last 24 Hrs  T(C): 37.3 (07-02-19 @ 13:47)  T(F): 99.2 (07-02-19 @ 13:47), Max: 99.2 (07-02-19 @ 13:47)  HR: 71 (07-02-19 @ 13:47) (63 - 80)  BP: 140/106 (07-02-19 @ 13:47)  RR: 17 (07-02-19 @ 13:47) (16 - 18)  SpO2: 100% (07-02-19 @ 13:47) (100% - 100%)      Constitutional: NAD, awake and alert  EYES: EOMI  ENT:  Normal Hearing  Respiratory: Breath sounds are clear bilaterally, No wheezing, rales or rhonchi  Cardiovascular: S1 and S2, regular rate and rhythm, no Murmurs, gallops or rubs, no JVD,    Gastrointestinal: Bowel Sounds present, soft, nontender, nondistended, no guarding, no rebound  Extremities: No cyanosis or clubbing  Vascular: 2+ peripheral pulses upper ex  : Not examined  Neurological: No focal deficits  Skin: No rashes  Psych: AAOx3  HEME: no bruises, no nose bleeds      LABS:   VAGINAL SWAB SENSITIVITY DATA:  Resistant to Acyclovir    RADIOLOGY & ADDITIONAL TESTS:    Consultant(s) Notes Reviewed:    Infectious Disease:  - continue ancef - will likely stop soon  - for wound care for debridement  - f/u derm for compounding of cidofovir  - f/u resistance testing (sent 6/4/19)  - f/u final biopsy (special stains requested yesterday)  - will consider cidofovir pending resistance testing and final path  - continue ARV and prophylaxis    Dermatology:  #Genital HSV, chronic, in immunocompromised patient.   -Recommend wound care consultation to optimize barrier protection of the affected areas to allow for healing- chronic trauma is impacting healing. Recommend Vaseline or Triple Paste. Will need to clean the area given exudative debris which will limit the penetration of any topical therapies.   -Trial of topical cidofovir BID to the AA, advised patient that vehicle in the hospital is a gel and that this is alcohol based and would result in burning on the affected area. Advised patient that obtaining topical cidofovir as an outpatient can be difficult and if not covered is expensive out of pocket.    -Pending special stains on Bx, and sensitivities- will follow final path report   -Please call with any questions      Patient to follow up at [facility] [address] [place] [address] [address] [phone] when ready for discharge.     Care Discussed with Consultants/Other Providers:  Pharmacy: Will compound topical Cidofovir 1%  Gynecology: Evaluating patient for debridement

## 2019-07-02 NOTE — PROGRESS NOTE ADULT - PROBLEM SELECTOR PLAN 1
Prior diagnosis in the setting of HIV. Current lesions have been present for 4 months despite treatment with multiple antivirals, resistance data shows resistant to Acyclovir. Causes patient pain, controlled well with Ketorolac. Consider superinfection d/t purulent exudate, which could help explain lack of response to antivirals. Biopsy obtained by Gynecology apparently returned as inflammatory changes, special stains sent out. Dermatology recs topical Cidofovir and will follow with her outpatient. Need to debride the exudate prior to application, consulted gynecology for this. Pharmacy compounding topical Cidofovir.   - stop Acyclovir d/t resistance  - c/w Cefazolin 1 gm IV q8  - Apply topical Cidofovir 1% tomorrow after debridement  - Follow with dermatology outpatient 3w after d/c  - afebrile, BP stable, no leukocytosis  - Ketoralac PRN for pain management, well controlled  - f/u final biopsy (special stains requested)  - would prefer not to use cidofovir systemically d/t toxicity

## 2019-07-02 NOTE — MEDICAL STUDENT PROGRESS NOTE(EDUCATION) - NS MD HP STUD ASPLAN ASSES FT
22 yo F w/h/o congenital HIV and HSV2 p/w refractory purulent lesions thought to be caused by a superinfection s/p 7 weeks refractory foscarnet tx

## 2019-07-02 NOTE — MEDICAL STUDENT PROGRESS NOTE(EDUCATION) - NS MD HP STUD ASPLAN PLAN FT
1) Genital lesions    - Sensitivity assay results not received yesterday, f/u today  - Biopsy results to r/o cancer: sent yesterday with special stain; should come back next week  - Derm consult recommendations:  	- Consult wound care to discuss barrier protection    2) HIV - continue ARV regimen and OI ppx 1) Genital lesions    - Sensitivity assay results not received yesterday, f/u today  - Biopsy results to r/o cancer: sent yesterday with special stain; should come back next week  - Derm consult recommendations:  	- Consult wound care to discuss barrier protection (derm suggests vasoline)  	- Consult wound care re: cleaning exudative debris for trial of topical cidofovir bid; review cleaning exudative debris with patient, as she is currently not in favor of this recommendation  - Confirm plan with outpatient ID (Dr. Chaves) for f/u    2) HIV - continue ARV regimen and OI ppx

## 2019-07-03 LAB
ANION GAP SERPL CALC-SCNC: 9 MMO/L — SIGNIFICANT CHANGE UP (ref 7–14)
BUN SERPL-MCNC: 9 MG/DL — SIGNIFICANT CHANGE UP (ref 7–23)
CALCIUM SERPL-MCNC: 9 MG/DL — SIGNIFICANT CHANGE UP (ref 8.4–10.5)
CHLORIDE SERPL-SCNC: 106 MMOL/L — SIGNIFICANT CHANGE UP (ref 98–107)
CO2 SERPL-SCNC: 25 MMOL/L — SIGNIFICANT CHANGE UP (ref 22–31)
CREAT SERPL-MCNC: 0.75 MG/DL — SIGNIFICANT CHANGE UP (ref 0.5–1.3)
GLUCOSE SERPL-MCNC: 88 MG/DL — SIGNIFICANT CHANGE UP (ref 70–99)
GRAM STN WND: SIGNIFICANT CHANGE UP
HCT VFR BLD CALC: 36.9 % — SIGNIFICANT CHANGE UP (ref 34.5–45)
HGB BLD-MCNC: 11.8 G/DL — SIGNIFICANT CHANGE UP (ref 11.5–15.5)
MCHC RBC-ENTMCNC: 28.2 PG — SIGNIFICANT CHANGE UP (ref 27–34)
MCHC RBC-ENTMCNC: 32 % — SIGNIFICANT CHANGE UP (ref 32–36)
MCV RBC AUTO: 88.1 FL — SIGNIFICANT CHANGE UP (ref 80–100)
NRBC # FLD: 0 K/UL — SIGNIFICANT CHANGE UP (ref 0–0)
PLATELET # BLD AUTO: 272 K/UL — SIGNIFICANT CHANGE UP (ref 150–400)
PMV BLD: 10.7 FL — SIGNIFICANT CHANGE UP (ref 7–13)
POTASSIUM SERPL-MCNC: 3.4 MMOL/L — LOW (ref 3.5–5.3)
POTASSIUM SERPL-SCNC: 3.4 MMOL/L — LOW (ref 3.5–5.3)
RBC # BLD: 4.19 M/UL — SIGNIFICANT CHANGE UP (ref 3.8–5.2)
RBC # FLD: 13.4 % — SIGNIFICANT CHANGE UP (ref 10.3–14.5)
SODIUM SERPL-SCNC: 140 MMOL/L — SIGNIFICANT CHANGE UP (ref 135–145)
SPECIMEN SOURCE: SIGNIFICANT CHANGE UP
WBC # BLD: 6.36 K/UL — SIGNIFICANT CHANGE UP (ref 3.8–10.5)
WBC # FLD AUTO: 6.36 K/UL — SIGNIFICANT CHANGE UP (ref 3.8–10.5)

## 2019-07-03 PROCEDURE — 99233 SBSQ HOSP IP/OBS HIGH 50: CPT | Mod: GC

## 2019-07-03 PROCEDURE — 11004 DBRDMT SKIN XTRNL GENT&PER: CPT | Mod: GC

## 2019-07-03 PROCEDURE — 99232 SBSQ HOSP IP/OBS MODERATE 35: CPT

## 2019-07-03 RX ORDER — HYDROMORPHONE HYDROCHLORIDE 2 MG/ML
1 INJECTION INTRAMUSCULAR; INTRAVENOUS; SUBCUTANEOUS ONCE
Refills: 0 | Status: DISCONTINUED | OUTPATIENT
Start: 2019-07-03 | End: 2019-07-03

## 2019-07-03 RX ORDER — MORPHINE SULFATE 50 MG/1
2 CAPSULE, EXTENDED RELEASE ORAL ONCE
Refills: 0 | Status: DISCONTINUED | OUTPATIENT
Start: 2019-07-03 | End: 2019-07-03

## 2019-07-03 RX ORDER — MORPHINE SULFATE 50 MG/1
2 CAPSULE, EXTENDED RELEASE ORAL ONCE
Refills: 0 | Status: COMPLETED | OUTPATIENT
Start: 2019-07-03 | End: 2019-07-10

## 2019-07-03 RX ORDER — LIDOCAINE HCL 20 MG/ML
1 VIAL (ML) INJECTION ONCE
Refills: 0 | Status: DISCONTINUED | OUTPATIENT
Start: 2019-07-03 | End: 2019-07-04

## 2019-07-03 RX ORDER — LIDOCAINE 4 G/100G
1 CREAM TOPICAL ONCE
Refills: 0 | Status: COMPLETED | OUTPATIENT
Start: 2019-07-03 | End: 2019-07-03

## 2019-07-03 RX ORDER — LIDOCAINE 4 G/100G
1 CREAM TOPICAL ONCE
Refills: 0 | Status: DISCONTINUED | OUTPATIENT
Start: 2019-07-03 | End: 2019-07-04

## 2019-07-03 RX ORDER — KETOROLAC TROMETHAMINE 30 MG/ML
15 SYRINGE (ML) INJECTION ONCE
Refills: 0 | Status: DISCONTINUED | OUTPATIENT
Start: 2019-07-03 | End: 2019-07-03

## 2019-07-03 RX ORDER — POTASSIUM CHLORIDE 20 MEQ
20 PACKET (EA) ORAL ONCE
Refills: 0 | Status: COMPLETED | OUTPATIENT
Start: 2019-07-03 | End: 2019-07-03

## 2019-07-03 RX ADMIN — LIDOCAINE 1 APPLICATION(S): 4 CREAM TOPICAL at 21:03

## 2019-07-03 RX ADMIN — HYDROMORPHONE HYDROCHLORIDE 1 MILLIGRAM(S): 2 INJECTION INTRAMUSCULAR; INTRAVENOUS; SUBCUTANEOUS at 13:49

## 2019-07-03 RX ADMIN — EMTRICITABINE AND TENOFOVIR DISOPROXIL FUMARATE 1 TABLET(S): 200; 300 TABLET, FILM COATED ORAL at 11:05

## 2019-07-03 RX ADMIN — ATAZANAVIR 300 MILLIGRAM(S): 200 CAPSULE ORAL at 11:05

## 2019-07-03 RX ADMIN — Medication 15 MILLIGRAM(S): at 02:40

## 2019-07-03 RX ADMIN — MORPHINE SULFATE 2 MILLIGRAM(S): 50 CAPSULE, EXTENDED RELEASE ORAL at 12:40

## 2019-07-03 RX ADMIN — HYDROMORPHONE HYDROCHLORIDE 1 MILLIGRAM(S): 2 INJECTION INTRAMUSCULAR; INTRAVENOUS; SUBCUTANEOUS at 13:34

## 2019-07-03 RX ADMIN — Medication 15 MILLIGRAM(S): at 01:44

## 2019-07-03 RX ADMIN — Medication 15 MILLIGRAM(S): at 15:04

## 2019-07-03 RX ADMIN — Medication 15 MILLIGRAM(S): at 20:37

## 2019-07-03 RX ADMIN — RITONAVIR 100 MILLIGRAM(S): 100 TABLET, FILM COATED ORAL at 11:05

## 2019-07-03 RX ADMIN — Medication 15 MILLIGRAM(S): at 21:07

## 2019-07-03 RX ADMIN — Medication 100 MILLIGRAM(S): at 01:46

## 2019-07-03 RX ADMIN — Medication 100 MILLIGRAM(S): at 11:03

## 2019-07-03 RX ADMIN — Medication 100 MILLIGRAM(S): at 18:59

## 2019-07-03 RX ADMIN — MORPHINE SULFATE 2 MILLIGRAM(S): 50 CAPSULE, EXTENDED RELEASE ORAL at 13:35

## 2019-07-03 NOTE — DIETITIAN INITIAL EVALUATION ADULT. - RD TO REMAIN AVAILABLE
1. Continue Regular diet. 2. Please Encourage po intake, assist with meals and menu selections, provide alternatives PRN. 3. Consumption of High Biological Value Protein sources (i.e. chicken, turkey, beef, fish, eggs, etc.).

## 2019-07-03 NOTE — MEDICAL STUDENT PROGRESS NOTE(EDUCATION) - SUBJECTIVE AND OBJECTIVE BOX
Patient is a 23y old  Female who presents with a chief complaint of genital HSV (02 Jul 2019 11:33)    SUBJECTIVE / OVERNIGHT EVENTS:  No acute overnight events. Patient has no complaints of pain. She does continue to feel some mild discomfort and itching around her vagina, which required a dose of keterolac for relief.  She also notes a rash developing at her previous IV site, which is why a new line was placed on her other arm.  She denies fevers, chills, abdominal pain, nausea, vomiting  Endorses normal bowel movements and decrease in urination (due to fluid restriction on her own volition).    MEDICATIONS  (STANDING):  atazanavir 300 milliGRAM(s) Oral daily  azithromycin   Tablet 1200 milliGRAM(s) Oral <User Schedule>  ceFAZolin   IVPB 1000 milliGRAM(s) IV Intermittent every 8 hours  emtricitabine 200 mG/tenofovir alafenamide 25 mG (DESCOVY) Tablet 1 Tablet(s) Oral daily  ritonavir Tablet 100 milliGRAM(s) Oral daily  trimethoprim  160 mG/sulfamethoxazole 800 mG 1 Tablet(s) Oral <User Schedule>    MEDICATIONS  (PRN):  ketorolac   Injectable 15 milliGRAM(s) IV Push every 6 hours PRN Moderate Pain (4 - 6)    PHYSICAL EXAM:  Vital Signs Last 24 Hrs  T(F): 97.5 (07-03-19 @ 05:44), Max: 99.2 (07-02-19 @ 13:47)  HR: 61 (07-03-19 @ 05:44) (61 - 71)  BP: 130/84 (07-03-19 @ 05:44) (S: 130-140, D: )  RR: 18 (07-03-19 @ 05:44) (17 - 18)  SpO2: 100% (07-03-19 @ 05:44) (100% - 100%)    Constitutional: NAD, awake and alert  EYES: EOMI  ENT:  Normal Hearing  Respiratory: Breath sounds are clear bilaterally, No wheezing, rales or rhonchi  Cardiovascular: S1 and S2, regular rate and rhythm, no Murmurs, gallops or rubs, no JVD,    Gastrointestinal: Bowel Sounds present, soft, nontender, nondistended, no guarding, no rebound  Extremities: No cyanosis or clubbing; non-erythematous small papular rash noted at site of previous IV site.  Vascular: 2+ peripheral pulses upper ex  : Not examined  Neurological: No focal deficits  Skin: No rashes  Psych: AAOx3  HEME: no bruises, no nose bleeds    LABS:   VAGINAL SWAB SENSITIVITY DATA:  Resistant to Acyclovir    (7/3/19)  Na: 140  K: 3.4  Cl: 106  CO2: 25  BUN: 9  Cr: 0.75    WBC: 6.36  Hgb: 11.8  Plt: 272    RADIOLOGY & ADDITIONAL TESTS:    Consultant(s) Notes Reviewed:    Infectious Disease:  - continue ancef - will likely stop soon  - f/u wound care for debridement  - f/u derm for compounding of cidofovir  - f/u final biopsy (special stains requested yesterday)  - will consider cidofovir pending resistance testing and final path  - continue ARV and prophylaxis    Dermatology:  #Genital HSV, chronic, in immunocompromised patient.   -Recommend wound care consultation to optimize barrier protection of the affected areas to allow for healing- chronic trauma is impacting healing. Recommend Vaseline or Triple Paste. Will need to clean the area given exudative debris which will limit the penetration of any topical therapies.   -Trial of topical cidofovir BID to the AA, advised patient that vehicle in the hospital is a gel and that this is alcohol based and would result in burning on the affected area. Advised patient that obtaining topical cidofovir as an outpatient can be difficult and if not covered is expensive out of pocket.    -Pending special stains on Bx, and sensitivities- will follow final path report   -Please call with any questions      Patient to follow up at [facility] [address] [place] [address] [address] [phone] when ready for discharge.     Care Discussed with Consultants/Other Providers:  Pharmacy: Will compound topical Cidofovir 1% in Eucerin (not gel & not alcohol based)  Gynecology: Evaluating patient for debridement  Dermatology: Will administer topical treatment s/p debridement. Patient is a 23y old  Female who presents with a chief complaint of genital HSV      SUBJECTIVE / OVERNIGHT EVENTS:  No acute overnight events. Patient has no complaints of pain. She does continue to feel some mild discomfort and itching around her vagina, which required a dose of keterolac for relief.  She also notes a rash developing at her previous IV site, which is why a new line was placed on her other arm.  She denies fevers, chills, abdominal pain, nausea, vomiting  Endorses normal bowel movements and decrease in urination (due to fluid restriction on her own volition).    MEDICATIONS  (STANDING):  atazanavir 300 milliGRAM(s) Oral daily  azithromycin   Tablet 1200 milliGRAM(s) Oral <User Schedule>  ceFAZolin   IVPB 1000 milliGRAM(s) IV Intermittent every 8 hours  emtricitabine 200 mG/tenofovir alafenamide 25 mG (DESCOVY) Tablet 1 Tablet(s) Oral daily  ritonavir Tablet 100 milliGRAM(s) Oral daily  trimethoprim  160 mG/sulfamethoxazole 800 mG 1 Tablet(s) Oral <User Schedule>    MEDICATIONS  (PRN):  ketorolac   Injectable 15 milliGRAM(s) IV Push every 6 hours PRN Moderate Pain (4 - 6)    PHYSICAL EXAM:  Vital Signs Last 24 Hrs  T(F): 97.5 (07-03-19 @ 05:44), Max: 99.2 (07-02-19 @ 13:47)  HR: 61 (07-03-19 @ 05:44) (61 - 71)  BP: 130/84 (07-03-19 @ 05:44) (S: 130-140, D: )  RR: 18 (07-03-19 @ 05:44) (17 - 18)  SpO2: 100% (07-03-19 @ 05:44) (100% - 100%)    Constitutional: NAD, awake and alert  EYES: EOMI  ENT:  Normal Hearing  Respiratory: Breath sounds are clear bilaterally, No wheezing, rales or rhonchi  Cardiovascular: S1 and S2, regular rate and rhythm, no Murmurs, gallops or rubs, no JVD,    Gastrointestinal: Bowel Sounds present, soft, nontender, nondistended, no guarding, no rebound  Extremities: No cyanosis or clubbing; non-erythematous small papular rash noted at site of previous IV site.  Vascular: 2+ peripheral pulses upper ex  : Not examined  Neurological: No focal deficits  Skin: No rashes  Psych: AAOx3  HEME: no bruises, no nose bleeds    LABS:   VAGINAL SWAB SENSITIVITY DATA:  Resistant to Acyclovir    (7/3/19)  Na: 140  K: 3.4  Cl: 106  CO2: 25  BUN: 9  Cr: 0.75    WBC: 6.36  Hgb: 11.8  Plt: 272    RADIOLOGY & ADDITIONAL TESTS:    Consultant(s) Notes Reviewed:    Infectious Disease:  - continue ancef - will likely stop soon  - f/u wound care for debridement  - f/u derm for compounding of cidofovir  - f/u final biopsy (special stains requested yesterday)  - will consider cidofovir pending resistance testing and final path  - continue ARV and prophylaxis    Dermatology:  #Genital HSV, chronic, in immunocompromised patient.   -Recommend wound care consultation to optimize barrier protection of the affected areas to allow for healing- chronic trauma is impacting healing. Recommend Vaseline or Triple Paste. Will need to clean the area given exudative debris which will limit the penetration of any topical therapies.   -Trial of topical cidofovir BID to the AA, advised patient that vehicle in the hospital is a gel and that this is alcohol based and would result in burning on the affected area. Advised patient that obtaining topical cidofovir as an outpatient can be difficult and if not covered is expensive out of pocket.    -Pending special stains on Bx, and sensitivities- will follow final path report   -Please call with any questions      Patient to follow up at [facility] [address] [place] [address] [address] [phone] when ready for discharge.     Care Discussed with Consultants/Other Providers:  Pharmacy: Will compound topical Cidofovir 1% in Eucerin (not gel & not alcohol based)  Gynecology: Evaluating patient for debridement  Dermatology: Will administer topical treatment s/p debridement. Patient is a 23y old  Female who presents with a chief complaint of genital HSV      SUBJECTIVE / OVERNIGHT EVENTS:  No acute overnight events. Patient has no complaints of pain. She does continue to feel some mild discomfort and itching around her vagina, which required a dose of keterolac for relief.  She also notes a rash developing at her previous IV site, which is why a new line was placed on her other arm.  She denies fevers, chills, abdominal pain, nausea, vomiting  Endorses normal bowel movements and decrease in urination (due to fluid restriction on her own volition).    MEDICATIONS  (STANDING):  atazanavir 300 milliGRAM(s) Oral daily  azithromycin   Tablet 1200 milliGRAM(s) Oral <User Schedule>  ceFAZolin   IVPB 1000 milliGRAM(s) IV Intermittent every 8 hours  emtricitabine 200 mG/tenofovir alafenamide 25 mG (DESCOVY) Tablet 1 Tablet(s) Oral daily  ritonavir Tablet 100 milliGRAM(s) Oral daily  trimethoprim  160 mG/sulfamethoxazole 800 mG 1 Tablet(s) Oral <User Schedule>    MEDICATIONS  (PRN):  ketorolac   Injectable 15 milliGRAM(s) IV Push every 6 hours PRN Moderate Pain (4 - 6)    PHYSICAL EXAM:  Vital Signs Last 24 Hrs  T(F): 97.5 (07-03-19 @ 05:44), Max: 99.2 (07-02-19 @ 13:47)  HR: 61 (07-03-19 @ 05:44) (61 - 71)  BP: 130/84 (07-03-19 @ 05:44) (S: 130-140, D: )  RR: 18 (07-03-19 @ 05:44) (17 - 18)  SpO2: 100% (07-03-19 @ 05:44) (100% - 100%)    Constitutional: NAD, awake and alert  EYES: EOMI  ENT:  Normal Hearing  Respiratory: Breath sounds are clear bilaterally, No wheezing, rales or rhonchi  Cardiovascular: S1 and S2, regular rate and rhythm, no Murmurs, gallops or rubs, no JVD,    Gastrointestinal: Bowel Sounds present, soft, nontender, nondistended, no guarding, no rebound  Extremities: No cyanosis or clubbing; non-erythematous small papular rash noted at site of previous IV site on left hand.  Vascular: 2+ peripheral pulses upper ex  : Not examined  Neurological: No focal deficits  Skin: No rashes  Psych: AAOx3  HEME: no bruises, no nose bleeds    LABS:   VAGINAL SWAB SENSITIVITY DATA:  Resistant to Acyclovir    (7/3/19)  Na: 140  K: 3.4  Cl: 106  CO2: 25  BUN: 9  Cr: 0.75    WBC: 6.36  Hgb: 11.8  Plt: 272    RADIOLOGY & ADDITIONAL TESTS:    Consultant(s) Notes Reviewed:    Infectious Disease:  - continue ancef - will likely stop soon  - f/u wound care for debridement  - f/u derm for compounding of cidofovir  - f/u final biopsy (special stains requested yesterday)  - will consider cidofovir pending resistance testing and final path  - continue ARV and prophylaxis    Dermatology:  #Genital HSV, chronic, in immunocompromised patient.   -Recommend wound care consultation to optimize barrier protection of the affected areas to allow for healing- chronic trauma is impacting healing. Recommend Vaseline or Triple Paste. Will need to clean the area given exudative debris which will limit the penetration of any topical therapies.   -Trial of topical cidofovir BID to the AA, advised patient that vehicle in the hospital is a gel and that this is alcohol based and would result in burning on the affected area. Advised patient that obtaining topical cidofovir as an outpatient can be difficult and if not covered is expensive out of pocket.    -Pending special stains on Bx, and sensitivities- will follow final path report   -Please call with any questions      Patient to follow up at [facility] [address] [place] [address] [address] [phone] when ready for discharge.     Care Discussed with Consultants/Other Providers:  Pharmacy: Will compound topical Cidofovir 1% in Eucerin (not gel & not alcohol based)  Gynecology: Evaluating patient for debridement  Dermatology: Will administer topical treatment s/p debridement.

## 2019-07-03 NOTE — CHART NOTE - NSCHARTNOTEFT_GEN_A_CORE
INTERVAL HPI/OVERNIGHT EVENTS:    -patient is s/p debridement and application of topical cidofovir, in pain from this   -using zinc oxide barrier paste     MEDICATIONS  (STANDING):  acyclovir IVPB 400 milliGRAM(s) IV Intermittent every 8 hours  atazanavir 300 milliGRAM(s) Oral daily  azithromycin   Tablet 1200 milliGRAM(s) Oral <User Schedule>  ceFAZolin   IVPB 1000 milliGRAM(s) IV Intermittent every 8 hours  ceFAZolin   IVPB      Cidofovir 1% cream (Eucerin) 35 Grams 1 Application(s) 1 Application(s) Topical daily  emtricitabine 200 mG/tenofovir alafenamide 25 mG (DESCOVY) Tablet 1 Tablet(s) Oral daily  lidocaine 1% Injectable 1 Vial(s) Local Injection once  lidocaine 2% Gel 1 Application(s) Topical once  ritonavir Tablet 100 milliGRAM(s) Oral daily  trimethoprim  160 mG/sulfamethoxazole 800 mG 1 Tablet(s) Oral <User Schedule>    MEDICATIONS  (PRN):  ketorolac   Injectable 15 milliGRAM(s) IV Push every 6 hours PRN Moderate Pain (4 - 6)      Allergies    No Known Allergies    Intolerances        REVIEW OF SYSTEMS      General: no fevers/chills, no NS	    Skin: see HPI  	  Ophthalmologic: no eye pain or change in vision    Genitourinary: no dysuria or hematuria    Musculoskeletal: no joint pains or weakness	    Neurological:no weakness or tingling          Vital Signs Last 24 Hrs  T(C): 36.8 (03 Jul 2019 12:47), Max: 36.8 (03 Jul 2019 12:47)  T(F): 98.2 (03 Jul 2019 12:47), Max: 98.2 (03 Jul 2019 12:47)  HR: 88 (03 Jul 2019 13:33) (61 - 88)  BP: 144/94 (03 Jul 2019 13:33) (130/84 - 144/95)  BP(mean): --  RR: 16 (03 Jul 2019 12:47) (16 - 18)  SpO2: 98% (03 Jul 2019 12:47) (98% - 100%)    PHYSICAL EXAM:   The patient was alert and oriented X 3, well nourished, and in no  apparent distress.  There was no visible lymphadenopathy.  Conjunctiva were non injected  There was no clubbing or edema of extremities.  There was no hyperhidrosis or bromhidrosis.    Of note on skin exam:   could not examine genital area secondary to pain and recent procedure and dressing placement     LABS:                        11.8   6.36  )-----------( 272      ( 03 Jul 2019 05:37 )             36.9     07-03    140  |  106  |  9   ----------------------------<  88  3.4<L>   |  25  |  0.75    Ca    9.0      03 Jul 2019 05:37    #Genital HSV, chronic, in immunocompromised patient. Acyclovir Resistant. S/p debridement   -Continue with topical cidofovir BID to the AA. 1% formulation. Apply to the base of the lesions and apply non-stick telfa dressing. Ensure that one application of medication is applied at bedtime so that can allow for more undisturbed time in which the dressing and medication can remain intact. Zinc oxide barrier paste to be applied to affected area as well- fragrance free       -Placed call to [facility] in [place] to order 1% Cidofovir topical- they will call patient and arrange home delivery as well as arrange for any needed prior authorization. Patient is amenable to paying out of pocket if insurance unable to cover   -Pending special stains on Bx, and sensitivities- will follow final path report   -Pending results of CMV PCR and CMV culture- these can be followed up as outpatient as well. PCR should result in the next 24 hours or so  -Will arrange for outpatient dermatology follow-up, discussed this with the patient.   -Please call with any questions      Above discussed with attending Dr. Luc Osorio     Patient to follow up at [facility] [address] [place] [address] [address] [phone] when ready for discharge.      Ibeth Monge MD   Dermatology Resident PGY-3  [phone]

## 2019-07-03 NOTE — PROGRESS NOTE ADULT - SUBJECTIVE AND OBJECTIVE BOX
Patient is a 23y old  Female who presents with a chief complaint of genital HSV (03 Jul 2019 11:43)    SUBJECTIVE / OVERNIGHT EVENTS:  No acute overnight events. Patient has no complaints of pain. She does continue to feel some discomfort and itching around her vagina, which required a dose of keterolac for relief.  She also notes a rash developing at her previous IV site. IV was changed last night from left arm to right.  She denies fevers, chills, abdominal pain, nausea, vomiting  Endorses normal bowel movements.    Patient experienced significant pain after debridement. Improved with IV Dilaudid.     MEDICATIONS  (STANDING):  acyclovir IVPB 400 milliGRAM(s) IV Intermittent every 8 hours  atazanavir 300 milliGRAM(s) Oral daily  azithromycin   Tablet 1200 milliGRAM(s) Oral <User Schedule>  ceFAZolin   IVPB 1000 milliGRAM(s) IV Intermittent every 8 hours  ceFAZolin   IVPB      Cidofovir 1% cream (Eucerin) 35 Grams 1 Application(s) 1 Application(s) Topical daily  emtricitabine 200 mG/tenofovir alafenamide 25 mG (DESCOVY) Tablet 1 Tablet(s) Oral daily  lidocaine 1% Injectable 1 Vial(s) Local Injection once  lidocaine 2% Gel 1 Application(s) Topical once  ritonavir Tablet 100 milliGRAM(s) Oral daily  trimethoprim  160 mG/sulfamethoxazole 800 mG 1 Tablet(s) Oral <User Schedule>    MEDICATIONS  (PRN):  ketorolac   Injectable 15 milliGRAM(s) IV Push every 6 hours PRN Moderate Pain (4 - 6)    PHYSICAL EXAM:  Vital Signs Last 24 Hrs  T(C): 36.8 (07-03-19 @ 12:47)  T(F): 98.2 (07-03-19 @ 12:47), Max: 98.2 (07-03-19 @ 12:47)  HR: 88 (07-03-19 @ 13:33) (61 - 88)  BP: 144/94 (07-03-19 @ 13:33)  BP(mean): --  RR: 16 (07-03-19 @ 12:47) (16 - 18)  SpO2: 98% (07-03-19 @ 12:47) (98% - 100%)  Wt(kg): --    Constitutional: NAD, awake and alert, distressed and tearful after procedure  EYES: EOMI  ENT:  Normal Hearing  Respiratory: Breath sounds are clear bilaterally, No wheezing, rales or rhonchi  Cardiovascular: S1 and S2, regular rate and rhythm, no Murmurs, gallops or rubs, no JVD  Gastrointestinal: abdomen soft, nontender, nondistended, no guarding, no rebound  Extremities: No cyanosis or clubbing; warm to touch  Neurological: No focal deficits  Skin: Small, non-erythematous papular rash on left wrist. Non-tender  Psych: AAOx3  HEME: no bruises, no nose bleeds      LABS:                        11.8   6.36  )-----------( 272      ( 03 Jul 2019 05:37 )             36.9     07-03    140  |  106  |  9   ----------------------------<  88  3.4<L>   |  25  |  0.75    Ca    9.0      03 Jul 2019 05:37        RADIOLOGY & ADDITIONAL TESTS:    Consultant(s) Notes Reviewed:    Gynecology  Problem: Genital herpes. Recommendation: Recs:  -Continue ART therapy  -Continue prophylactic medications    Infectious Disease:   Suggest:  - can stop ancef  - for GYN to debride - would premedicate with pain meds  - compounding of cidofovir - to be done today  - f/u final biopsy (special stains requested yesterday)  - continue ARV and prophylaxis  - if discharged today, can f/u derm for topical cidofovir  - outpatient f/u HIV (she sees Dr. Hoover)  - from ID standpoint, okay to d/c home today    Dermatology:  #Genital HSV, chronic, in immunocompromised patient. Acyclovir Resistant. S/p debridement   -Continue with topical cidofovir BID to the AA. 1% formulation. Apply to the base of the lesions and apply non-stick telfa dressing. Ensure that one application of medication is applied at bedtime so that can allow for more undisturbed time in which the dressing and medication can remain intact. Zinc oxide barrier paste to be applied to affected area as well- fragrance free       -Placed call to [facility] in [place] to order 1% Cidofovir topical- they will call patient and arrange home delivery as well as arrange for any needed prior authorization. Patient is amenable to paying out of pocket if insurance unable to cover   -Pending special stains on Bx, and sensitivities- will follow final path report   -Pending results of CMV PCR and CMV culture- these can be followed up as outpatient as well. PCR should result in the next 24 hours or so  -Will arrange for outpatient dermatology follow-up, discussed this with the patient.   -Please call with any questions      Care Discussed with Consultants/Other Providers:  Wound Care, Pharmacy

## 2019-07-03 NOTE — DIETITIAN INITIAL EVALUATION ADULT. - NUTRITION CONSULT
Abdominal pain    Achilles tendinitis, right leg    Constipation    HTN (hypertension)    Hypothyroidism    Lower back pain    Plantar fasciitis, right    Sciatica of left side    Seizures  last one 2004  Single kidney  Donated  Spinal stenosis    Weight loss no

## 2019-07-03 NOTE — PROGRESS NOTE ADULT - SUBJECTIVE AND OBJECTIVE BOX
Follow Up:  refractory genital HSV 2,  HIV    Interval History/ROS:  no fever.  no n/v/d.  tolerating ARV.  genital lesions about the same. Remainder of ROS otherwise negative.    Allergies  No Known Allergies    ANTIMICROBIALS:    atazanavir 300 daily  azithromycin   Tablet 1200 <User Schedule>  emtricitabine 200 mG/tenofovir alafenamide 25 mG (DESCOVY) Tablet 1 daily  ritonavir Tablet 100 daily  trimethoprim  160 mG/sulfamethoxazole 800 mG 1 <User Schedule>  acyclovir IVPB 400 every 8 hours (6/26-)  ceFAZolin   IVPB 1000 every 8 hours (6/26-)    MEDICATIONS  (STANDING):  morphine  - Injectable 2 once    Vital Signs Last 24 Hrs  T(F): 97.5 (07-03-19 @ 05:44), Max: 99.2 (07-02-19 @ 13:47)  HR: 61 (07-03-19 @ 05:44)  BP: 130/84 (07-03-19 @ 05:44)  RR: 18 (07-03-19 @ 05:44)  SpO2: 100% (07-03-19 @ 05:44) (100% - 100%)    PHYSICAL EXAM:  General:  non-toxic  HEAD/EYES:  white sclera   ENT:   supple   Cardiovascular: S1S2  Respiratory:   clear bilaterally  GI:   soft, non-tender, normal bowel sounds  :   exudative genital ulcers bilateral labia  Musculoskeletal:   no synovitis  Neurologic:   non-focal exam   Skin:  RUE bullous lesions opened no cellulitis  Psychiatric:   appropriate affect  Lines:   no phlebitis                                        11.8   6.36  )-----------( 272      ( 03 Jul 2019 05:37 )             36.9 07-03    140  |  106  |  9   ----------------------------<  88  3.4   |  25  |  0.75  Ca    9.0      03 Jul 2019 05:37    T Cell Subset (06.26.19 @ 04:56)    CD4 %: 15 %    ABS CD4: 344 cell/uL    MICROBIOLOGY:  outpatient viral culture 6/4/19 sent for sensitivities - R-acv  outpatient 6/19/19 - HSV-2 detected    Pathology:  Final Diagnosis  Vulva, biopsy  - Ulcerated skin with prominent mixed inflammatory infiltrate and marked reactive changes, see note    Note: Multiple immunohistochemical studies are performed to further characterize the inflammatory infiltrate, to be evaluated by the Hematopathology team in an addendum to follow.    RADIOLOGY:  n/a

## 2019-07-03 NOTE — MEDICAL STUDENT PROGRESS NOTE(EDUCATION) - NS MD HP STUD ASPLAN PLAN FT
1) Genital lesions  - Sensitivity assay showed acyclovir resistance - d/c'd acyclovir tx  - Gyn evaluating pt this AM for debridement; aim for procedure this afternoon in coordination with derm and pharmacy  - Derm wants ID to r/o potential for superimposed CMV infection via PCR vs. culture, as they are unconvinced that bx is sensitive enough.    2) Hypokalemia: administer potassium chloride tablet 20mg PO and continue to monitor    3) HIV: continue ARV and OI ppx regimen

## 2019-07-03 NOTE — DIETITIAN INITIAL EVALUATION ADULT. - ENERGY NEEDS
Height (cm): 160.02 (26 Jun 2019 18:02)  Weight (kg): 66.2 (26 Jun 2019, admit)  BMI (kg/m2): 25.9 (26 Jun 2019)  IBW: 52.2kg +/-10%

## 2019-07-03 NOTE — DIETITIAN INITIAL EVALUATION ADULT. - OTHER INFO
Per chart review patient with medical history of congenital HIV, genital HSV, presenting with increased pain vulvar lesions. Patient reports poor PO intake and appetite from April to May due to hospitalization at that time. Patient endorses 20# (9kg) weight loss over that time period. States that her PO intake and appetite have improved since then. No recent weight loss. NKFA. No GI distress (nausea/vomiting/diarrhea/constipation) noted at this time.

## 2019-07-03 NOTE — CHART NOTE - NSCHARTNOTEFT_GEN_A_CORE
NUTRITION SERVICES     Upon Nutritional Assessment by the Registered Dietitian your patient was determined to meet criteria/ has evidence of the following diagnosis/diagnoses:  [ ] Mild Protein Calorie Malnutrition   [ ] Moderate Protein Calorie Malnutrition   [x] Severe Protein Calorie Malnutrition   [ ] Unspecified Protein Calorie Malnutrition   [ ] Underweight / BMI <19  [ ] Morbid Obesity / BMI >40    Findings as based on:  •  Comprehensive nutritional assessment and consultation    Please refer to Initial Dietitian Evaluation or Nutrition Follow-up via documents section of [employer] EMR for further recommendations.

## 2019-07-03 NOTE — CONSULT NOTE ADULT - ASSESSMENT
Assessment:  SHABNAM HA is a 23y  admitted w/ HSV2 resistant to acyclovir consulted for debridement of HSV Plaques for placement of topical cidofovir.

## 2019-07-03 NOTE — ADVANCED PRACTICE NURSE CONSULT - REASON FOR CONSULT
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

## 2019-07-03 NOTE — CONSULT NOTE ADULT - SUBJECTIVE AND OBJECTIVE BOX
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 Currently has 2 painful lesions in the labia.   Treated with IV foscarnet x 6 weeks April - May 2019 and again at higher dose earlier this month.  PICC was removed .  Viral culture was sent from outpatient office for sensitivities on ; called [facility] representative who contacted reference lab, expect results in few days.  Patient was evaluated by dermatology last week- biopsy taken; results pending.  Compliant with ART. Last viral load <30 CD4 215.    Pt is a 22YO  PMH of congenital HIV admitted w/ HSV. HSV resistant to the acyclovir that the patient was taking. COnsulted for debridement of the plaques that formed over HSV lesions for administration of cidofovir cream directly to the HSV lesions for treatment. The patient said that she is currently asymptomatic. She was admitted on  w/ the HSV. CD4 344. Viral load <30,000. Patient had not been taking prophylactic medications for immunocompromised diseases. Patient was placed on medications with little improvement of HSV. She recently formed plaques over lesions. They are mildly tender; however, patient can tolerate exams. Patient says she gets irregular periods because she uses Depo for birth control. Unknown when her last period was.     ED course: given Ketoralac x1, pain improved (2019 16:38)        Name of GYN Physician: Saw Dr. San once but only for diagnosis of HSV.    POB:  1x D&C for eTOP    Pgyn: Denies fibroids, cysts, endometriosis, Has history of abnormal paps and vaginal biopsy showing HSV2    Home meds:     Hospital Meds:   MEDICATIONS  (STANDING):  acyclovir IVPB 400 milliGRAM(s) IV Intermittent every 8 hours  atazanavir 300 milliGRAM(s) Oral daily  azithromycin   Tablet 1200 milliGRAM(s) Oral <User Schedule>  ceFAZolin   IVPB 1000 milliGRAM(s) IV Intermittent every 8 hours  ceFAZolin   IVPB      emtricitabine 200 mG/tenofovir alafenamide 25 mG (DESCOVY) Tablet 1 Tablet(s) Oral daily  potassium chloride    Tablet ER 20 milliEquivalent(s) Oral once  ritonavir Tablet 100 milliGRAM(s) Oral daily  trimethoprim  160 mG/sulfamethoxazole 800 mG 1 Tablet(s) Oral <User Schedule>    MEDICATIONS  (PRN):  ketorolac   Injectable 15 milliGRAM(s) IV Push every 6 hours PRN Moderate Pain (4 - 6)      Allergies    No Known Allergies    Intolerances        PAST MEDICAL & SURGICAL HISTORY:  Genital herpes  HIV (human immunodeficiency virus infection)  No significant past surgical history      FAMILY HISTORY:  No pertinent family history in first degree relatives      Social History:  Denies smoking use, drug use, alcohol use.   +occasional social alcohol use    Vital Signs Last 24 Hrs  T(C): 36.4 (2019 05:44), Max: 37.3 (2019 13:47)  T(F): 97.5 (2019 05:44), Max: 99.2 (2019 13:47)  HR: 61 (2019 05:44) (61 - 71)  BP: 130/84 (2019 05:44) (130/84 - 140/106)  BP(mean): --  RR: 18 (2019 05:44) (17 - 18)  SpO2: 100% (2019 05:44) (100% - 100%)    Physical Exam:   General: sitting comftorably in bed, NAD   HEENT: neck supple, full ROM  CV: RR S1S2 no m/r/g  Lungs: CTA b/l, good air flow b/l   Back: No CVA tenderness  Abd: Soft, non-tender, non-distended.  Bowel sounds present.    :  No bleeding on pad.    External labia wnl.  Bimanual exam with no cervical motion tenderness, uterus wnl, adnexa non palpable b/l.  Cervix closed vs. Cervix dilated    cm   Speculum Exam: No active bleeding from os.  Physiologic discharge.    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 Currently has 2 painful lesions in the labia.   Treated with IV foscarnet x 6 weeks April - May 2019 and again at higher dose earlier this month.  PICC was removed .  Viral culture was sent from outpatient office for sensitivities on ; called [facility] representative who contacted reference lab, expect results in few days.  Patient was evaluated by dermatology last week- biopsy taken; results pending.  Compliant with ART. Last viral load <30 CD4 215.    Pt is a 24YO  PMH of congenital HIV admitted w/ HSV. HSV resistant to the acyclovir that the patient was taking. COnsulted for debridement of the plaques that formed over HSV lesions for administration of cidofovir cream directly to the HSV lesions for treatment. The patient said that she is currently asymptomatic. She was admitted on  w/ the HSV. CD4 344. Viral load <30,000. Patient had not been taking prophylactic medications for immunocompromised diseases. Patient was placed on medications with little improvement of HSV. She recently formed plaques over lesions. They are mildly tender; however, patient can tolerate exams. Patient says she gets irregular periods because she uses Depo for birth control. Unknown when her last period was.     ED course: given Ketoralac x1, pain improved (2019 16:38)        Name of GYN Physician: Saw Dr. San once but only for diagnosis of HSV.    POB:  1x D&C for eTOP    Pgyn: Denies fibroids, cysts, endometriosis, Has history of abnormal paps and vaginal biopsy showing HSV2    Home meds:     Hospital Meds:   MEDICATIONS  (STANDING):  acyclovir IVPB 400 milliGRAM(s) IV Intermittent every 8 hours  atazanavir 300 milliGRAM(s) Oral daily  azithromycin   Tablet 1200 milliGRAM(s) Oral <User Schedule>  ceFAZolin   IVPB 1000 milliGRAM(s) IV Intermittent every 8 hours  ceFAZolin   IVPB      emtricitabine 200 mG/tenofovir alafenamide 25 mG (DESCOVY) Tablet 1 Tablet(s) Oral daily  potassium chloride    Tablet ER 20 milliEquivalent(s) Oral once  ritonavir Tablet 100 milliGRAM(s) Oral daily  trimethoprim  160 mG/sulfamethoxazole 800 mG 1 Tablet(s) Oral <User Schedule>    MEDICATIONS  (PRN):  ketorolac   Injectable 15 milliGRAM(s) IV Push every 6 hours PRN Moderate Pain (4 - 6)      Allergies    No Known Allergies    Intolerances        PAST MEDICAL & SURGICAL HISTORY:  Genital herpes  HIV (human immunodeficiency virus infection)  No significant past surgical history      FAMILY HISTORY:  No pertinent family history in first degree relatives      Social History:  Denies smoking use, drug use, alcohol use.   +occasional social alcohol use    Vital Signs Last 24 Hrs  T(C): 36.4 (2019 05:44), Max: 37.3 (2019 13:47)  T(F): 97.5 (2019 05:44), Max: 99.2 (2019 13:47)  HR: 61 (2019 05:44) (61 - 71)  BP: 130/84 (2019 05:44) (130/84 - 140/106)  BP(mean): --  RR: 18 (2019 05:44) (17 - 18)  SpO2: 100% (2019 05:44) (100% - 100%)    Physical Exam:   General: sitting comftorably in bed, NAD   Abd: Soft, non-tender, non-distended.  Bowel sounds present.    :  No bleeding on pad. HSV plaque formed on b/l labia and vulva approximately 5cm in size. Indurated. Moderately tender.   Bimanual exam with no cervical motion tenderness, uterus wnl, adnexa non palpable b/l.  Cervix closed   Speculum Exam: No active bleeding from os.  Physiologic discharge.  No HSV lesions within the vaginal canal  Ext: non-tender b/l, no edema     LABS:                              11.8   6.36  )-----------( 272      ( 2019 05:37 )             36.9     07-03    140  |  106  |  9   ----------------------------<  88  3.4<L>   |  25  |  0.75    Ca    9.0      2019 05:37      I&O's Detail          RADIOLOGY & ADDITIONAL STUDIES:

## 2019-07-03 NOTE — PROGRESS NOTE ADULT - PROBLEM SELECTOR PLAN 1
Prior diagnosis in the setting of HIV. Current lesions have been present for 4 months despite treatment with multiple antivirals, resistance data shows resistant to Acyclovir. Causes patient pain, controlled well with Ketorolac. Consider superinfection d/t purulent exudate, which could help explain lack of response to antivirals. Biopsy obtained by Gynecology apparently returned as inflammatory changes, special stains sent out. Dermatology recs topical Cidofovir and will follow with her outpatient.   - stop Acyclovir d/t resistance  - c/w Cefazolin 1 gm IV q8  - Apply topical Cidofovir 1% BID  - Follow with dermatology outpatient 3w after d/c  - afebrile, BP stable, no leukocytosis  - Ketoralac PRN for pain management, well controlled  - f/u final biopsy (special stains requested)  - would prefer not to use cidofovir systemically d/t toxicity  - F/u CMV tissue culture and swab Prior diagnosis in the setting of HIV. Current lesions have been present for 4 months despite treatment with multiple antivirals, resistance data shows resistant to Acyclovir. Causes patient pain, controlled well with Ketorolac. Consider superinfection d/t purulent exudate, which could help explain lack of response to antivirals. Biopsy obtained by Gynecology apparently returned as inflammatory changes, special stains sent out. Dermatology recs topical Cidofovir and will follow with her outpatient.   - stop Acyclovir d/t resistance  - stop Cefazolin   - Apply topical Cidofovir 1% BID  - Follow with dermatology outpatient 3w after d/c  - afebrile, BP stable, no leukocytosis  - Ketoralac PRN for pain management, well controlled  - f/u final biopsy (special stains requested)  - would prefer not to use cidofovir systemically d/t toxicity  - F/u CMV tissue culture and swab

## 2019-07-03 NOTE — CONSULT NOTE ADULT - PROBLEM SELECTOR RECOMMENDATION 9
Recs:  -Continue ART therapy  -Continue prophylactic medications  -Will examine on 7/3 for possible debridement

## 2019-07-03 NOTE — DIETITIAN INITIAL EVALUATION ADULT. - PERTINENT MEDS FT
acyclovir IVPB  atazanavir  azithromycin   Tablet  ceFAZolin   IVPB  ceFAZolin   IVPB  Cidofovir 1% cream (Eucerin) 35 Grams 1 Application(s)  emtricitabine 200 mG/tenofovir alafenamide 25 mG (DESCOVY) Tablet  ketorolac   Injectable PRN  lidocaine 1% Injectable  lidocaine 2% Gel  ritonavir Tablet  trimethoprim  160 mG/sulfamethoxazole 800 mG

## 2019-07-04 ENCOUNTER — TRANSCRIPTION ENCOUNTER (OUTPATIENT)
Age: 24
End: 2019-07-04

## 2019-07-04 VITALS
HEART RATE: 70 BPM | RESPIRATION RATE: 16 BRPM | TEMPERATURE: 98 F | OXYGEN SATURATION: 100 % | SYSTOLIC BLOOD PRESSURE: 132 MMHG | DIASTOLIC BLOOD PRESSURE: 93 MMHG

## 2019-07-04 LAB
ANION GAP SERPL CALC-SCNC: 8 MMO/L — SIGNIFICANT CHANGE UP (ref 7–14)
BUN SERPL-MCNC: 8 MG/DL — SIGNIFICANT CHANGE UP (ref 7–23)
CALCIUM SERPL-MCNC: 9.1 MG/DL — SIGNIFICANT CHANGE UP (ref 8.4–10.5)
CHLORIDE SERPL-SCNC: 106 MMOL/L — SIGNIFICANT CHANGE UP (ref 98–107)
CO2 SERPL-SCNC: 25 MMOL/L — SIGNIFICANT CHANGE UP (ref 22–31)
CREAT SERPL-MCNC: 0.75 MG/DL — SIGNIFICANT CHANGE UP (ref 0.5–1.3)
GLUCOSE SERPL-MCNC: 88 MG/DL — SIGNIFICANT CHANGE UP (ref 70–99)
HCT VFR BLD CALC: 38.1 % — SIGNIFICANT CHANGE UP (ref 34.5–45)
HGB BLD-MCNC: 12.1 G/DL — SIGNIFICANT CHANGE UP (ref 11.5–15.5)
MAGNESIUM SERPL-MCNC: 1.7 MG/DL — SIGNIFICANT CHANGE UP (ref 1.6–2.6)
MCHC RBC-ENTMCNC: 27.5 PG — SIGNIFICANT CHANGE UP (ref 27–34)
MCHC RBC-ENTMCNC: 31.8 % — LOW (ref 32–36)
MCV RBC AUTO: 86.6 FL — SIGNIFICANT CHANGE UP (ref 80–100)
NRBC # FLD: 0 K/UL — SIGNIFICANT CHANGE UP (ref 0–0)
PHOSPHATE SERPL-MCNC: 3.5 MG/DL — SIGNIFICANT CHANGE UP (ref 2.5–4.5)
PLATELET # BLD AUTO: 287 K/UL — SIGNIFICANT CHANGE UP (ref 150–400)
PMV BLD: 10 FL — SIGNIFICANT CHANGE UP (ref 7–13)
POTASSIUM SERPL-MCNC: 3.8 MMOL/L — SIGNIFICANT CHANGE UP (ref 3.5–5.3)
POTASSIUM SERPL-SCNC: 3.8 MMOL/L — SIGNIFICANT CHANGE UP (ref 3.5–5.3)
RBC # BLD: 4.4 M/UL — SIGNIFICANT CHANGE UP (ref 3.8–5.2)
RBC # FLD: 13.2 % — SIGNIFICANT CHANGE UP (ref 10.3–14.5)
SODIUM SERPL-SCNC: 139 MMOL/L — SIGNIFICANT CHANGE UP (ref 135–145)
WBC # BLD: 6.45 K/UL — SIGNIFICANT CHANGE UP (ref 3.8–10.5)
WBC # FLD AUTO: 6.45 K/UL — SIGNIFICANT CHANGE UP (ref 3.8–10.5)

## 2019-07-04 PROCEDURE — 99239 HOSP IP/OBS DSCHRG MGMT >30: CPT | Mod: GC

## 2019-07-04 RX ORDER — AZITHROMYCIN 500 MG/1
2 TABLET, FILM COATED ORAL
Qty: 8 | Refills: 0
Start: 2019-07-04

## 2019-07-04 RX ADMIN — Medication 15 MILLIGRAM(S): at 13:28

## 2019-07-04 RX ADMIN — Medication 100 MILLIGRAM(S): at 01:30

## 2019-07-04 RX ADMIN — Medication 15 MILLIGRAM(S): at 06:00

## 2019-07-04 RX ADMIN — RITONAVIR 100 MILLIGRAM(S): 100 TABLET, FILM COATED ORAL at 13:27

## 2019-07-04 RX ADMIN — Medication 15 MILLIGRAM(S): at 13:58

## 2019-07-04 RX ADMIN — EMTRICITABINE AND TENOFOVIR DISOPROXIL FUMARATE 1 TABLET(S): 200; 300 TABLET, FILM COATED ORAL at 13:26

## 2019-07-04 RX ADMIN — Medication 15 MILLIGRAM(S): at 06:15

## 2019-07-04 RX ADMIN — ATAZANAVIR 300 MILLIGRAM(S): 200 CAPSULE ORAL at 13:26

## 2019-07-04 NOTE — DISCHARGE NOTE NURSING/CASE MANAGEMENT/SOCIAL WORK - NSDCDPATPORTLINK_GEN_ALL_CORE
You can access the [facility], offered by [facility], by registering with the following website: [email or web address]

## 2019-07-04 NOTE — PROGRESS NOTE ADULT - ASSESSMENT
23F with congenital HIV.  Previously non-adherent.  Now back on ARV for over 6 months.  Most recently controlled with viral load detected <30 and CD4 344 on ART.  Here with refractory genital HSV 2 s/p recent course of IV foscarnet x 7 weeks with some improvement (stopped 5/20/19) and restarted for for 2 weeks without much improvement.  Did not respond to Aldara (could not tolerate) or trifluridine topically.  Presents with painful vulvar ulcers.  Biopsy done to rule out malignancy or other etiologies - pending.  Refractory HSV-2 with possible superinfection.  Some response to ancef now.      1) HIV - continue art  Continue OI prophyalxis    2) ? superimposed cellulitis  Continue ancef    3) Vulvar lesion  prior tx for HSV without response    ? of resistance ve secondary process    Follow up with pathology re status of immunohistochemistry  Await resistance testing    Can continue acyclovir    I would not change to cidofovir given its toxicity until resistance testing results are available and pathology confirms that biopsy is cw HSV    Dw patient and she agrees that waiting is best course    No objection to trial of topical txs    Suggest:  - continue ancef  - f/u resistance testing (sent 6/4/19)  - f/u final biopsy (special stains requested yesterday)  - consider 2nd opinion  - would prefer not to use cidofovir  - continue ARV and prophylaxis    Please call the ID service [phone] with questions or concerns over the weekend
23F with congenital HIV.  Previously non-adherent.  Now back on ARV for over 6 months.  Most recently controlled with viral load detected <30 and CD4 344 on ART.  Here with refractory genital HSV 2 s/p recent course of IV foscarnet x 7 weeks with some improvement (stopped 5/20/19) and restarted for for 2 weeks without much improvement.  Did not respond to Aldara (could not tolerate) or trifluridine topically.  Presents with painful vulvar ulcers.  Biopsy done to rule out malignancy or other etiologies - pending.  Refractory HSV-2 with possible superinfection.  Some response to ancef now +/- IV acv    Suggest:  - continue ancef - will likely stop soon  - for wound care for debridement  - f/u derm for compounding of cidofovir  - f/u resistance testing (sent 6/4/19)  - f/u final biopsy (special stains requested yesterday)  - will consider cidofovir pending resistance testing and final path  - continue ARV and prophylaxis
23F with congenital HIV.  Previously non-adherent.  Now back on ARV for over 6 months.  Most recently controlled with viral load detected <30 and CD4 344 on ART.  Here with refractory genital HSV 2 s/p recent course of IV foscarnet x 7 weeks with some improvement (stopped 5/20/19) and restarted for for 2 weeks without much improvement.  Did not respond to Aldara (could not tolerate) or trifluridine topically.  Presents with painful vulvar ulcers.  Biopsy done to rule out malignancy or other etiologies - pending.  Refractory HSV-2 with possible superinfection.  Some response to ancef now.    Suggest:  - continue ancef  - f/u resistance testing (sent 6/4/19)  - f/u final biopsy (special stains requested yesterday)  - consider 2nd opinion  - would prefer not to use cidofovir  - continue ARV and prophylaxis    Please call the ID service [phone] with questions or concerns over the weekend
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
22 yo woman with congenital HIV, now controlled with viral load detected <30 and CD4 215 on ART with refractory genital HSV 2 s/p recent course of IV foscarnet x 7 weeks presents with painful vulvar ulcers.  On exam there are 2 vulvar ulcers with exudate.  ?superinfection.  Concern for resistance; culture sent to reference lab for sensitivities on 6/4/19; [facility] contacted reference lab, expect results in next few days.  Patient was evaluated by dermatologist last week; biopsy done.      Suggest:  Continue Acyclovir 400 mg iv q 8 h                 Continue Cefazolin 1 gm iv q 8 h                 check results of drmatology bx                 continue Rayataz 300 mg po daily, ritonovir 100 mg po daily,  Descovy 1 tab po daily                 continue bactrim DS 1 tab po 3x/ week                 azithro 1200 mg po weekly on sat      Lupe Andrade MD  Pager: [phone]  After 5 PM or weekends please call fellow on call or office [phone]
23F with congenital HIV.  Previously non-adherent.  Now back on ARV for over 6 months.  Most recently controlled with viral load detected <30 and CD4 344 on ART.  Here with refractory genital HSV 2 s/p recent course of IV foscarnet x 7 weeks with some improvement (stopped 5/20/19) and restarted for for 2 weeks without much improvement.  Did not respond to Aldara (could not tolerate) or trifluridine topically.  Presents with painful labial ulcers.  Biopsy done to rule out malignancy or other etiologies - final results pending.  Refractory HSV-2 R-acv.  Some response to ancef now +/- IV acv    Suggest:  - can stop ancef  - for GYN to debride - would premedicate with pain meds  - compounding of cidofovir - to be done today  - f/u final biopsy (special stains requested yesterday)  - continue ARV and prophylaxis  - if discharged today, can f/u derm for topical cidofovir  - outpatient f/u HIV (she sees Dr. Hoover)  - from ID standpoint, okay to d/c home today    Please call the ID service [phone] with questions or concerns over the holiday weekend
23F with congenital HIV.  Previously non-adherent.  Now back on ARV for over 6 months.  Most recently controlled with viral load detected <30 and CD4 344 on ART.  Here with refractory genital HSV 2 s/p recent course of IV foscarnet x 7 weeks with some improvement (stopped 5/20/19) and restarted for for 2 weeks without much improvement.  Did not respond to Aldara (could not tolerate) or trifluridine topically.  Presents with painful vulvar ulcers.  Biopsy done to rule out malignancy or other etiologies - pending.  Refractory HSV-2 with possible superinfection.  Some response to ancef now +/- IV acv    Suggest:  - continue ancef  - f/u resistance testing (sent 6/4/19)  - f/u final biopsy (special stains requested yesterday)  - will consider cidofovir pending resistance testing and final path  - continue ARV and prophylaxis
22 yo F with congenital HIV with viral load <30 and CD4 161 on ART and not compliant with PJP prophylaxis admitted for genital HSV 2 refractory to several antivirals including IV foscarnet x7 weeks and painful ulcers. Patient being followed closely by ID.
22 yo F with congenital HIV with viral load <30 and CD4 161 on ART and not compliant with PJP prophylaxis admitted for genital HSV 2 refractory to several antivirals including IV foscarnet x7 weeks and painful ulcers. Patient being followed closely by ID.
22 yo F with congenital HIV with viral load <30 and CD4 344 on ART and not compliant with PJP prophylaxis admitted for genital HSV 2 refractory to several antivirals including IV foscarnet x7 weeks and painful ulcers. Patient being followed closely by ID, awaiting special stains of biopsy (results expected next week), and resistance testing. Dermatology consulted, appreciate recs.
22 yo F with congenital HIV with viral load <30 and CD4 344 on ART and not compliant with PJP prophylaxis admitted for genital HSV 2 refractory to several antivirals including IV foscarnet x7 weeks and painful ulcers. Resistant to Acyclovir shown by sensitivity data. Patient being followed closely by ID, awaiting special stains of biopsy (results expected next week). Dermatology consulted, recommending topical Cidofovir, will be compounded by pharmacy. Needs to be applied to clean area directly on lesions, consulted gynecology for debridement.
22 yo F with congenital HIV with viral load <30 and CD4 344 on ART and not compliant with PJP prophylaxis admitted for genital HSV 2 refractory to several antivirals including IV foscarnet x7 weeks and painful ulcers. Resistant to Acyclovir shown by sensitivity data. Patient being followed closely by ID, awaiting special stains of biopsy (results expected next week). Gynecology debrided lesions today, took tissue culture and swab of lesions for CMV, and applied topical Cidofovir. Patient tolerated the procedure, but had pain.
22 yo F with congenital HIV with viral load <30 and CD4 161 on ART and not compliant with PJP prophylaxis admitted for genital HSV 2 refractory to several antivirals including IV foscarnet x7 weeks and painful ulcers.
24 yo F with congenital HIV with viral load <30 and CD4 344 on ART and not compliant with PJP prophylaxis admitted for genital HSV 2 refractory to several antivirals including IV foscarnet x7 weeks and painful ulcers. Resistant to Acyclovir shown by sensitivity data. Patient being followed closely by ID, awaiting special stains of biopsy (results expected next week). Gynecology debrided lesions today, took tissue culture and swab of lesions for CMV, and applied topical Cidofovir. Patient tolerated the procedure, but had pain.

## 2019-07-04 NOTE — PROGRESS NOTE ADULT - SUBJECTIVE AND OBJECTIVE BOX
Patient is a 23y old  Female who presents with a chief complaint of genital HSV (04 Jul 2019 06:28)      SUBJECTIVE / OVERNIGHT EVENTS:  Patient feels well today. Does not complain of pain but does endorse swelling of her labia.     MEDICATIONS  (STANDING):  acyclovir IVPB 400 milliGRAM(s) IV Intermittent every 8 hours  atazanavir 300 milliGRAM(s) Oral daily  azithromycin   Tablet 1200 milliGRAM(s) Oral <User Schedule>  ceFAZolin   IVPB 1000 milliGRAM(s) IV Intermittent every 8 hours  ceFAZolin   IVPB      Cidofovir 1% cream (Eucerin) 35 Grams 1 Application(s) 1 Application(s) Topical daily  emtricitabine 200 mG/tenofovir alafenamide 25 mG (DESCOVY) Tablet 1 Tablet(s) Oral daily  lidocaine 1% Injectable 1 Vial(s) Local Injection once  lidocaine 2% Gel 1 Application(s) Topical once  ritonavir Tablet 100 milliGRAM(s) Oral daily  trimethoprim  160 mG/sulfamethoxazole 800 mG 1 Tablet(s) Oral <User Schedule>    MEDICATIONS  (PRN):  ketorolac   Injectable 15 milliGRAM(s) IV Push every 6 hours PRN Moderate Pain (4 - 6)      CAPILLARY BLOOD GLUCOSE        I&O's Summary      PHYSICAL EXAM:  Vital Signs Last 24 Hrs  T(C): 36.6 (07-04-19 @ 06:04)  T(F): 97.9 (07-04-19 @ 06:04), Max: 98.2 (07-03-19 @ 12:47)  HR: 70 (07-04-19 @ 06:04) (70 - 88)  BP: 132/93 (07-04-19 @ 06:04)  BP(mean): --  RR: 16 (07-04-19 @ 06:04) (16 - 18)  SpO2: 100% (07-04-19 @ 06:04) (98% - 100%)  Wt(kg): --    Constitutional: NAD, awake and alert  EYES: EOMI  ENT:  Normal Hearing, no tonsillar exudates   Neck: Soft and supple , no thyromegaly   Respiratory: Breath sounds are clear bilaterally, No wheezing, rales or rhonchi  Cardiovascular: S1 and S2, regular rate and rhythm, no Murmurs, gallops or rubs, no JVD,    Gastrointestinal: Bowel Sounds present, soft, nontender, nondistended, no guarding, no rebound  Extremities: No cyanosis or clubbing; warm to touch  Vascular: 2+ peripheral pulses lower ex  Neurological: No focal deficits, CN II-XII intact bilaterally, sensation to light touch intact in all extremities, gait intact. Pupils are equally reactive to light and symmetrical in size.   Musculoskeletal: 5/5 strength b/l upper and lower extremities; no joint swelling.  Skin: No rashes  Psych: no depression or anhedonia, AAOx3  HEME: no bruises, no nose bleeds      LABS:                        12.1   6.45  )-----------( 287      ( 04 Jul 2019 06:08 )             38.1     07-04    139  |  106  |  8   ----------------------------<  88  3.8   |  25  |  0.75    Ca    9.1      04 Jul 2019 06:08  Phos  3.5     07-04  Mg     1.7     07-04                RADIOLOGY & ADDITIONAL TESTS:    Imaging Personally Reviewed:    Consultant(s) Notes Reviewed:      Care Discussed with Consultants/Other Providers: Patient is a 23y old  Female who presents with a chief complaint of genital HSV (04 Jul 2019 06:28)      SUBJECTIVE / OVERNIGHT EVENTS:  Patient feels well today. Does not complain of pain but does endorse swelling of her labia.     MEDICATIONS  (STANDING):  acyclovir IVPB 400 milliGRAM(s) IV Intermittent every 8 hours  atazanavir 300 milliGRAM(s) Oral daily  azithromycin   Tablet 1200 milliGRAM(s) Oral <User Schedule>  ceFAZolin   IVPB 1000 milliGRAM(s) IV Intermittent every 8 hours  ceFAZolin   IVPB      Cidofovir 1% cream (Eucerin) 35 Grams 1 Application(s) 1 Application(s) Topical daily  emtricitabine 200 mG/tenofovir alafenamide 25 mG (DESCOVY) Tablet 1 Tablet(s) Oral daily  lidocaine 1% Injectable 1 Vial(s) Local Injection once  lidocaine 2% Gel 1 Application(s) Topical once  ritonavir Tablet 100 milliGRAM(s) Oral daily  trimethoprim  160 mG/sulfamethoxazole 800 mG 1 Tablet(s) Oral <User Schedule>    MEDICATIONS  (PRN):  ketorolac   Injectable 15 milliGRAM(s) IV Push every 6 hours PRN Moderate Pain (4 - 6)    PHYSICAL EXAM:  Vital Signs Last 24 Hrs  T(C): 36.6 (07-04-19 @ 06:04)  T(F): 97.9 (07-04-19 @ 06:04), Max: 98.2 (07-03-19 @ 12:47)  HR: 70 (07-04-19 @ 06:04) (70 - 88)  BP: 132/93 (07-04-19 @ 06:04)  BP(mean): --  RR: 16 (07-04-19 @ 06:04) (16 - 18)  SpO2: 100% (07-04-19 @ 06:04) (98% - 100%)  Wt(kg): --    Constitutional: NAD, awake and alert  EYES: EOMI  ENT:  Normal Hearing  Respiratory: Breathing unlabored    Gastrointestinal: soft, nontender, nondistended, no guarding, no rebound  Extremities: No cyanosis or clubbing; warm to touch  Neurological: No focal deficits  Musculoskeletal: 5/5 strength b/l upper and lower extremities; no joint swelling.  Skin: Small papular rash on left hand  Psych: AAOx3  HEME: no bruises, no nose bleeds      LABS:                        12.1   6.45  )-----------( 287      ( 04 Jul 2019 06:08 )             38.1     07-04    139  |  106  |  8   ----------------------------<  88  3.8   |  25  |  0.75    Ca    9.1      04 Jul 2019 06:08  Phos  3.5     07-04  Mg     1.7     07-04 Patient is a 23y old  Female who presents with a chief complaint of genital HSV (04 Jul 2019 06:28)      SUBJECTIVE / OVERNIGHT EVENTS:  Patient feels well today. Does not complain of pain but does endorse swelling of her labia, which she feels is somewhat bothersome. She denies headache, nausea, vomiting, chest pain, shortness of breath, lightheadedness, dizziness, or bleeding.    MEDICATIONS  (STANDING):  acyclovir IVPB 400 milliGRAM(s) IV Intermittent every 8 hours  atazanavir 300 milliGRAM(s) Oral daily  azithromycin   Tablet 1200 milliGRAM(s) Oral <User Schedule>  ceFAZolin   IVPB 1000 milliGRAM(s) IV Intermittent every 8 hours  ceFAZolin   IVPB      Cidofovir 1% cream (Eucerin) 35 Grams 1 Application(s) 1 Application(s) Topical daily  emtricitabine 200 mG/tenofovir alafenamide 25 mG (DESCOVY) Tablet 1 Tablet(s) Oral daily  lidocaine 1% Injectable 1 Vial(s) Local Injection once  lidocaine 2% Gel 1 Application(s) Topical once  ritonavir Tablet 100 milliGRAM(s) Oral daily  trimethoprim  160 mG/sulfamethoxazole 800 mG 1 Tablet(s) Oral <User Schedule>    MEDICATIONS  (PRN):  ketorolac   Injectable 15 milliGRAM(s) IV Push every 6 hours PRN Moderate Pain (4 - 6)    PHYSICAL EXAM:  Vital Signs Last 24 Hrs  T(C): 36.6 (07-04-19 @ 06:04)  T(F): 97.9 (07-04-19 @ 06:04), Max: 98.2 (07-03-19 @ 12:47)  HR: 70 (07-04-19 @ 06:04) (70 - 88)  BP: 132/93 (07-04-19 @ 06:04)  BP(mean): --  RR: 16 (07-04-19 @ 06:04) (16 - 18)  SpO2: 100% (07-04-19 @ 06:04) (98% - 100%)  Wt(kg): --    Constitutional: NAD, awake and alert  EYES: EOMI  ENT:  Normal Hearing  Respiratory: Breathing unlabored    Gastrointestinal: soft, nontender, nondistended, no guarding, no rebound  Extremities: No cyanosis or clubbing; warm to touch  Neurological: No focal deficits  Musculoskeletal: 5/5 strength b/l upper and lower extremities; no joint swelling.  Skin: Small papular rash on left hand without erythema  Psych: AAOx3  HEME: no bruises, no nose bleeds      LABS:                        12.1   6.45  )-----------( 287      ( 04 Jul 2019 06:08 )             38.1     07-04    139  |  106  |  8   ----------------------------<  88  3.8   |  25  |  0.75    Ca    9.1      04 Jul 2019 06:08  Phos  3.5     07-04  Mg     1.7     07-04

## 2019-07-04 NOTE — PROGRESS NOTE ADULT - PROBLEM SELECTOR PLAN 2
- Continue home HIV medications  Rayataz 300 mg po daily, ritonovir 100 mg po daily,  Descovy 1 tab po daily  - Patient does not take bactrim DS 1 tab po 3x/ week, azithro 1200 mg po weekly on sat. Will administer per schedule while in hospital for prophylaxis  - outpt f/u with ID clinic - Continue home HIV medications  Rayataz 300 mg po daily, ritonovir 100 mg po daily,  Descovy 1 tab po daily  - Patient does not take bactrim DS 1 tab po 3x/ week, azithro 1200 mg po weekly on sat. Will prescribe for discharge  - outpt f/u with ID clinic

## 2019-07-04 NOTE — PROGRESS NOTE ADULT - PROBLEM SELECTOR PLAN 3
DVT prophylaxis: pt is ambulatory, no pharm agent required  Diet: Regular  Dispo: Inpatient DVT prophylaxis: pt is ambulatory, no pharm agent required  Diet: Regular  Dispo: Discharge to home

## 2019-07-04 NOTE — PROGRESS NOTE ADULT - PROVIDER SPECIALTY LIST ADULT
Infectious Disease
Internal Medicine
Infectious Disease
Internal Medicine

## 2019-07-04 NOTE — DISCHARGE NOTE PROVIDER - CARE PROVIDERS DIRECT ADDRESSES
[email or web address]

## 2019-07-04 NOTE — PROGRESS NOTE ADULT - PROBLEM SELECTOR PLAN 1
Prior diagnosis in the setting of HIV. Current lesions have been present for 4 months despite treatment with multiple antivirals, resistance data shows resistant to Acyclovir. Causes patient pain, controlled well with Ketorolac. Consider superinfection d/t purulent exudate, which could help explain lack of response to antivirals. Biopsy obtained by Gynecology apparently returned as inflammatory changes, special stains sent out. Dermatology recs topical Cidofovir and will follow with her outpatient.   - stop Acyclovir d/t resistance  - stop Cefazolin   - Apply topical Cidofovir 1% BID  - Follow with dermatology outpatient 3w after d/c  - afebrile, BP stable, no leukocytosis  - Ketoralac PRN for pain management, well controlled  - f/u final biopsy (special stains requested)  - would prefer not to use cidofovir systemically d/t toxicity  - F/u CMV tissue culture and swab Prior diagnosis in the setting of HIV. Current lesions have been present for 4 months despite treatment with multiple antivirals, resistance data shows resistant to Acyclovir. Causes patient pain, controlled well with Ketorolac. Consider superinfection d/t purulent exudate, which could help explain lack of response to antivirals. Biopsy obtained by Gynecology apparently returned as inflammatory changes, special stains sent out. Dermatology recs topical Cidofovir and will follow with her outpatient.   - stop Acyclovir d/t resistance  - stop Cefazolin   - Apply topical Cidofovir 1% BID  - Follow with dermatology outpatient 3w after d/c  - would prefer not to use cidofovir systemically d/t toxicity  - F/u CMV tissue culture and swab

## 2019-07-04 NOTE — PROGRESS NOTE ADULT - PROBLEM SELECTOR PROBLEM 1
Genital herpes

## 2019-07-04 NOTE — DISCHARGE NOTE PROVIDER - NSDCCPCAREPLAN_GEN_ALL_CORE_FT
PRINCIPAL DISCHARGE DIAGNOSIS  Diagnosis: Genital herpes  Assessment and Plan of Treatment: PRINCIPAL DISCHARGE DIAGNOSIS  Diagnosis: Genital herpes  Assessment and Plan of Treatment: You were admitted with genital pain. You were diagnosed with Herpes Simplex Virus - 2. This virus causes sores that can be itchy or painful. Treatment was attempted with medicine called acyclovir, which did not effectively treat the lesions. This was because we received data that told us that the virus is resistant to that particular medicine. Another medicine in the form of a cream was applied afterwards, which was called cidofovir. You should follow up with Dermatology regarding this treatment.

## 2019-07-04 NOTE — DISCHARGE NOTE PROVIDER - CARE PROVIDER_API CALL
Luc Osorio)  Dermatology  [address], [address]  [place] [address]  Phone: [phone]  Fax: [phone]  Follow Up Time:     Ana Chaves)  Infectious Disease; Internal Medicine  [address] [place] [address]  Phone: [phone]  Fax: [phone]  Follow Up Time:

## 2019-07-04 NOTE — DISCHARGE NOTE PROVIDER - HOSPITAL COURSE
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 She was treated in the past with IV foscarnet x 7 weeks who returned to ER on 6/27/19 with increased pain of vulvar lesions. Currently has 2 painful lesions in the labia. She had been taking daily Valtrex for months, which failed as of Feb 2019.    Treated with IV foscarnet x 6 weeks April - May 2019 via PICC and again at higher dose earlier in June.  PICC was removed 6/21.    Compliant with ART. Last viral load <30 CD4 215.        ED course: given Ketoralac x1, pain improved          On admission, she continued to be treated with IV acyclovir, as well as Ancef for suspected superimposed infection due to presence of exudate on the lesions. Pain was adequately managed with ketorolac. A second opinion was sought by a private Infectious Disease physician who recommended topical cidofovir, as the lesions did not appear to be responding to IV acyclovir. Systemic cidofovir was avoided due to toxicity. Results from sensitivity testing obtained outpatient prior to admission were returned on 7/2, and revealed lesions to be resistant to acyclovir, at which point the decision to apply topical cidofovir was made in agreement with Infectious Disease and Dermatology. On 7/3, Gynecology agreed to debride the lesions, and obtain tissue culture and swab of lesion bases to test for CMV, as recommended by Dermatology. After debridement of the area, topical cidofovir 1% cream, compounded by Pharmacy, was applied to the lesions. Patient experienced pain with debridement but tolerated the procedure well. 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 She was treated in the past with IV foscarnet x 7 weeks who returned to ER on 6/27/19 with increased pain of vulvar lesions. Currently has 2 painful lesions in the labia. She had been taking daily Valtrex for months, which failed as of Feb 2019.    Treated with IV foscarnet x 6 weeks April - May 2019 via PICC and again at higher dose earlier in June.  PICC was removed 6/21.    Compliant with ART. Last viral load <30 CD4 215.        ED course: given Ketoralac x1, pain improved          On admission, she continued to be treated with IV acyclovir, as well as Ancef for suspected superimposed infection due to presence of exudate on the lesions. Pain was adequately managed with ketorolac. A second opinion was sought by a private Infectious Disease physician who recommended topical cidofovir, as the lesions did not appear to be responding to IV acyclovir. Systemic cidofovir was avoided due to toxicity. Results from sensitivity testing obtained outpatient prior to admission were returned on 7/2, and revealed lesions to be resistant to acyclovir, at which point the decision to apply topical cidofovir was made in agreement with Infectious Disease and Dermatology. On 7/3, Gynecology agreed to debride the lesions, and obtain tissue culture and swab of lesion bases to test for CMV, as recommended by Dermatology. After debridement of the area, topical cidofovir 1% cream, compounded by Pharmacy, was applied to the lesions. Patient experienced pain with debridement but tolerated the procedure well. She will be discharged on 7/4. 22 yo woman with congenital HIV presents with genital HSV 2 vesicles refractory to several antivirals. She was treated in the past with IV foscarnet x 7 weeks who returned to ER on 6/27/19 with increased pain of vulvar lesions. Currently has 2 painful lesions in the labia. She had been taking daily Valtrex for months, which failed as of Feb 2019.    Treated with IV foscarnet x 6 weeks April - May 2019 via PICC and again at higher dose earlier in June.  PICC was removed 6/21.    Compliant with ART. Last viral load <30 CD4 215.        ED course: given Ketoralac x1, pain improved          On admission, she continued to be treated with IV acyclovir, as well as Ancef for suspected superimposed infection due to presence of exudate on the lesions. Pain was adequately managed with ketorolac. A second opinion was sought by a private Infectious Disease physician who recommended topical cidofovir, as the lesions did not appear to be responding to IV acyclovir. Systemic cidofovir was avoided due to toxicity. Results from sensitivity testing obtained outpatient prior to admission were returned on 7/2, and revealed lesions to be resistant to acyclovir, at which point the decision to apply topical cidofovir was made in agreement with Infectious Disease and Dermatology. On 7/3, Gynecology agreed to debride the lesions, and obtain tissue culture and swab of lesion bases to test for CMV, as recommended by Dermatology. After debridement of the area, topical cidofovir 1% cream, compounded by Pharmacy, was applied to the lesions. Patient experienced pain with debridement but tolerated the procedure well. She will be discharged on 7/4.

## 2019-07-05 LAB — CULTURE - SURGICAL SITE: SIGNIFICANT CHANGE UP

## 2019-07-08 LAB — MISCELLANEOUS - CHEM: SIGNIFICANT CHANGE UP

## 2019-07-30 ENCOUNTER — APPOINTMENT (OUTPATIENT)
Dept: DERMATOLOGY | Facility: CLINIC | Age: 24
End: 2019-07-30
Payer: COMMERCIAL

## 2019-07-30 PROCEDURE — 99214 OFFICE O/P EST MOD 30 MIN: CPT

## 2019-08-27 ENCOUNTER — APPOINTMENT (OUTPATIENT)
Dept: DERMATOLOGY | Facility: CLINIC | Age: 24
End: 2019-08-27
Payer: COMMERCIAL

## 2019-08-27 PROCEDURE — 99213 OFFICE O/P EST LOW 20 MIN: CPT

## 2019-09-24 ENCOUNTER — LABORATORY RESULT (OUTPATIENT)
Age: 24
End: 2019-09-24

## 2019-09-24 ENCOUNTER — APPOINTMENT (OUTPATIENT)
Dept: DERMATOLOGY | Facility: CLINIC | Age: 24
End: 2019-09-24
Payer: COMMERCIAL

## 2019-09-24 DIAGNOSIS — L73.1 PSEUDOFOLLICULITIS BARBAE: ICD-10-CM

## 2019-09-24 DIAGNOSIS — L98.499 NON-PRESSURE CHRONIC ULCER OF SKIN OF OTHER SITES WITH UNSPECIFIED SEVERITY: ICD-10-CM

## 2019-09-24 PROCEDURE — 99213 OFFICE O/P EST LOW 20 MIN: CPT

## 2019-09-24 RX ORDER — HYDROCORTISONE 25 MG/G
2.5 OINTMENT TOPICAL
Qty: 1 | Refills: 0 | Status: DISCONTINUED | COMMUNITY
Start: 2019-09-24 | End: 2019-09-24

## 2019-09-27 PROBLEM — L98.499 ULCER OF SKIN: Status: ACTIVE | Noted: 2019-09-24

## 2019-09-30 ENCOUNTER — APPOINTMENT (OUTPATIENT)
Dept: INFECTIOUS DISEASE | Facility: CLINIC | Age: 24
End: 2019-09-30
Payer: COMMERCIAL

## 2019-09-30 VITALS
HEIGHT: 63 IN | WEIGHT: 145 LBS | RESPIRATION RATE: 18 BRPM | TEMPERATURE: 97.7 F | BODY MASS INDEX: 25.69 KG/M2 | OXYGEN SATURATION: 99 % | HEART RATE: 58 BPM | SYSTOLIC BLOOD PRESSURE: 119 MMHG | DIASTOLIC BLOOD PRESSURE: 72 MMHG

## 2019-09-30 DIAGNOSIS — B00.9 HERPESVIRAL INFECTION, UNSPECIFIED: ICD-10-CM

## 2019-09-30 DIAGNOSIS — B20 HUMAN IMMUNODEFICIENCY VIRUS [HIV] DISEASE: ICD-10-CM

## 2019-09-30 PROCEDURE — 99214 OFFICE O/P EST MOD 30 MIN: CPT

## 2019-09-30 RX ORDER — NAPROXEN SODIUM 550 MG/1
550 TABLET ORAL
Qty: 20 | Refills: 3 | Status: DISCONTINUED | COMMUNITY
Start: 2019-03-27 | End: 2019-09-30

## 2019-09-30 RX ORDER — FAMCICLOVIR 250 MG/1
250 TABLET, FILM COATED ORAL 3 TIMES DAILY
Qty: 30 | Refills: 0 | Status: DISCONTINUED | COMMUNITY
Start: 2019-02-27 | End: 2019-09-30

## 2019-09-30 RX ORDER — ISOTRETINOIN 30 MG/1
CAPSULE, GELATIN COATED ORAL
Refills: 0 | Status: DISCONTINUED | COMMUNITY
End: 2019-09-30

## 2019-09-30 RX ORDER — FLUCONAZOLE 100 MG/1
100 TABLET ORAL DAILY
Qty: 7 | Refills: 2 | Status: DISCONTINUED | COMMUNITY
Start: 2019-06-04 | End: 2019-09-30

## 2019-10-01 LAB
CD3 CELLS # BLD: 1557 /UL
CD3 CELLS NFR BLD: 64 %
CD3+CD4+ CELLS # BLD: 338 /UL
CD3+CD4+ CELLS NFR BLD: 14 %
CD3+CD4+ CELLS/CD3+CD8+ CLL SPEC: 0.31 RATIO
CD3+CD8+ CELLS # SPEC: 1082 /UL
CD3+CD8+ CELLS NFR BLD: 44 %

## 2019-10-07 LAB
HIV1 RNA # SERPL NAA+PROBE: ABNORMAL
HIV1 RNA # SERPL NAA+PROBE: ABNORMAL COPIES/ML
VIRAL LOAD INTERP: NORMAL
VIRAL LOAD LOG: ABNORMAL LG COP/ML

## 2019-10-08 ENCOUNTER — APPOINTMENT (OUTPATIENT)
Dept: INFECTIOUS DISEASE | Facility: CLINIC | Age: 24
End: 2019-10-08

## 2019-10-15 ENCOUNTER — APPOINTMENT (OUTPATIENT)
Dept: DERMATOLOGY | Facility: CLINIC | Age: 24
End: 2019-10-15

## 2019-10-15 ENCOUNTER — RX RENEWAL (OUTPATIENT)
Age: 24
End: 2019-10-15

## 2019-11-26 ENCOUNTER — APPOINTMENT (OUTPATIENT)
Dept: DERMATOLOGY | Facility: CLINIC | Age: 24
End: 2019-11-26
Payer: COMMERCIAL

## 2019-11-26 PROCEDURE — 99214 OFFICE O/P EST MOD 30 MIN: CPT

## 2020-05-05 ENCOUNTER — TRANSCRIPTION ENCOUNTER (OUTPATIENT)
Age: 25
End: 2020-05-05

## 2020-06-22 ENCOUNTER — APPOINTMENT (OUTPATIENT)
Dept: DERMATOLOGY | Facility: CLINIC | Age: 25
End: 2020-06-22

## 2020-07-28 ENCOUNTER — APPOINTMENT (OUTPATIENT)
Dept: DERMATOLOGY | Facility: CLINIC | Age: 25
End: 2020-07-28
Payer: COMMERCIAL

## 2020-07-28 VITALS — HEIGHT: 63 IN | WEIGHT: 145 LBS | TEMPERATURE: 97.8 F | BODY MASS INDEX: 25.69 KG/M2

## 2020-07-28 PROCEDURE — 99213 OFFICE O/P EST LOW 20 MIN: CPT

## 2020-08-27 ENCOUNTER — TRANSCRIPTION ENCOUNTER (OUTPATIENT)
Age: 25
End: 2020-08-27

## 2020-10-29 ENCOUNTER — OUTPATIENT (OUTPATIENT)
Dept: OUTPATIENT SERVICES | Facility: HOSPITAL | Age: 25
LOS: 1 days | Discharge: HOME | End: 2020-10-29

## 2020-10-29 DIAGNOSIS — L89.90 PRESSURE ULCER OF UNSPECIFIED SITE, UNSPECIFIED STAGE: ICD-10-CM

## 2020-11-04 DIAGNOSIS — Z79.899 OTHER LONG TERM (CURRENT) DRUG THERAPY: ICD-10-CM

## 2020-11-04 DIAGNOSIS — B20 HUMAN IMMUNODEFICIENCY VIRUS [HIV] DISEASE: ICD-10-CM

## 2020-11-04 DIAGNOSIS — F17.210 NICOTINE DEPENDENCE, CIGARETTES, UNCOMPLICATED: ICD-10-CM

## 2020-11-04 DIAGNOSIS — C84.00 MYCOSIS FUNGOIDES, UNSPECIFIED SITE: ICD-10-CM

## 2020-11-04 DIAGNOSIS — Z97.3 PRESENCE OF SPECTACLES AND CONTACT LENSES: ICD-10-CM

## 2020-11-10 ENCOUNTER — APPOINTMENT (OUTPATIENT)
Dept: DERMATOLOGY | Facility: CLINIC | Age: 25
End: 2020-11-10
Payer: COMMERCIAL

## 2020-11-10 DIAGNOSIS — B35.6 TINEA CRURIS: ICD-10-CM

## 2020-11-10 PROCEDURE — 99072 ADDL SUPL MATRL&STAF TM PHE: CPT

## 2020-11-10 PROCEDURE — 99213 OFFICE O/P EST LOW 20 MIN: CPT

## 2021-03-19 ENCOUNTER — NON-APPOINTMENT (OUTPATIENT)
Age: 26
End: 2021-03-19

## 2021-09-28 ENCOUNTER — APPOINTMENT (OUTPATIENT)
Dept: DERMATOLOGY | Facility: CLINIC | Age: 26
End: 2021-09-28
Payer: COMMERCIAL

## 2021-09-28 VITALS — HEIGHT: 64 IN | WEIGHT: 145 LBS | BODY MASS INDEX: 24.75 KG/M2

## 2021-09-28 DIAGNOSIS — B00.9 HUMAN IMMUNODEFICIENCY VIRUS [HIV] DISEASE: ICD-10-CM

## 2021-09-28 DIAGNOSIS — B20 HUMAN IMMUNODEFICIENCY VIRUS [HIV] DISEASE: ICD-10-CM

## 2021-09-28 PROCEDURE — 99213 OFFICE O/P EST LOW 20 MIN: CPT

## 2022-04-11 NOTE — PROGRESS NOTE ADULT - ATTENDING COMMENTS
OB Attg  Pt seen and examined by me.  I evaluated lesions and seemingly improving from photos taken a few days ago.  Agree with above assessment and plan
Pt seen by me.  Agree with above assessment and plan
Never

## 2023-01-26 NOTE — DISCHARGE NOTE NURSING/CASE MANAGEMENT/SOCIAL WORK - NSDCPNPNATDISSUGG_GEN_ALL_CORE
Schedule Proced:   Please Schedule Routine (next available or patient preference)  Procedure: Colonoscopy (09794) with SuPrep  Diagnosis: LLQ pain R10.32, IBS-D K58.0  Is patient:             Diabetic? No             On Coumadin, heparin, lovenox? No             On ASA/NSAIDS? Platelet Modifying (examples - Plavix, aspirin, nsaids, Aggrenox)? No             On Phentermine? No             On Viagra, Sildenafil, Verdenafil, Tadalafil? No  Latex allergy: no  Sleep apnea: no  Location: Patient Preference  Special Instructions:   MAC Anesthesia  Physician: Dr. Arango        Routed to Providers Surgery Scheduling Pool.   No

## 2023-04-10 ENCOUNTER — APPOINTMENT (OUTPATIENT)
Dept: INTERNAL MEDICINE | Facility: CLINIC | Age: 28
End: 2023-04-10
Payer: MEDICAID

## 2023-04-10 VITALS
OXYGEN SATURATION: 98 % | HEART RATE: 60 BPM | HEIGHT: 64 IN | BODY MASS INDEX: 26.63 KG/M2 | DIASTOLIC BLOOD PRESSURE: 58 MMHG | SYSTOLIC BLOOD PRESSURE: 110 MMHG | RESPIRATION RATE: 16 BRPM | WEIGHT: 156 LBS

## 2023-04-10 DIAGNOSIS — L60.8 OTHER NAIL DISORDERS: ICD-10-CM

## 2023-04-10 DIAGNOSIS — Z00.00 ENCOUNTER FOR GENERAL ADULT MEDICAL EXAMINATION W/OUT ABNORMAL FINDINGS: ICD-10-CM

## 2023-04-10 DIAGNOSIS — B20 HUMAN IMMUNODEFICIENCY VIRUS [HIV] DISEASE: ICD-10-CM

## 2023-04-10 PROCEDURE — 99203 OFFICE O/P NEW LOW 30 MIN: CPT

## 2023-04-10 RX ORDER — DOXYCYCLINE 100 MG/1
100 CAPSULE ORAL TWICE DAILY
Qty: 28 | Refills: 0 | Status: DISCONTINUED | COMMUNITY
Start: 2019-09-24 | End: 2023-04-10

## 2023-04-10 RX ORDER — IMIQUIMOD 50 MG/G
5 CREAM TOPICAL
Qty: 28 | Refills: 2 | Status: DISCONTINUED | COMMUNITY
Start: 2019-04-18 | End: 2023-04-10

## 2023-04-10 RX ORDER — TRIFLURIDINE 10 MG/ML
1 SOLUTION OPHTHALMIC
Qty: 4 | Refills: 1 | Status: DISCONTINUED | COMMUNITY
Start: 2019-05-13 | End: 2023-04-10

## 2023-04-10 RX ORDER — LIDOCAINE HCL/ALOE/COLLAGEN 2%-1%-1.2%
2 GEL (GRAM) TOPICAL
Qty: 1 | Refills: 3 | Status: DISCONTINUED | COMMUNITY
Start: 2019-08-27 | End: 2023-04-10

## 2023-04-10 RX ORDER — LIDOCAINE 5 G/100G
5 OINTMENT TOPICAL
Qty: 1 | Refills: 1 | Status: DISCONTINUED | COMMUNITY
Start: 2019-07-30 | End: 2023-04-10

## 2023-04-10 RX ORDER — LIDOCAINE 5 G/100G
5 OINTMENT TOPICAL
Qty: 1 | Refills: 0 | Status: DISCONTINUED | COMMUNITY
Start: 2019-03-29 | End: 2023-04-10

## 2023-04-10 RX ORDER — LIDOCAINE 5 G/100G
5 OINTMENT TOPICAL
Qty: 1 | Refills: 1 | Status: DISCONTINUED | COMMUNITY
Start: 2019-08-20 | End: 2023-04-10

## 2023-04-10 RX ORDER — RITONAVIR 100 MG 100 MG/1
100 TABLET ORAL DAILY
Qty: 30 | Refills: 3 | Status: DISCONTINUED | COMMUNITY
Start: 2019-01-03 | End: 2023-04-10

## 2023-04-10 RX ORDER — ATAZANAVIR 300 MG/1
300 CAPSULE ORAL DAILY
Qty: 30 | Refills: 3 | Status: DISCONTINUED | COMMUNITY
Start: 2019-01-03 | End: 2023-04-10

## 2023-04-10 RX ORDER — KETOCONAZOLE 20 MG/G
2 CREAM TOPICAL
Qty: 1 | Refills: 2 | Status: DISCONTINUED | COMMUNITY
Start: 2020-07-28 | End: 2023-04-10

## 2023-04-10 RX ORDER — DARUNAVIR, COBICISTAT, EMTRICITABINE, AND TENOFOVIR ALAFENAMIDE 800; 150; 200; 10 MG/1; MG/1; MG/1; MG/1
800-150-200-10 TABLET, FILM COATED ORAL
Qty: 1 | Refills: 0 | Status: ACTIVE | COMMUNITY
Start: 2023-04-10

## 2023-04-10 RX ORDER — EMTRICITABINE AND TENOFOVIR ALAFENAMIDE 200; 25 MG/1; MG/1
200-25 TABLET ORAL
Qty: 30 | Refills: 3 | Status: DISCONTINUED | COMMUNITY
Start: 2019-01-03 | End: 2023-04-10

## 2023-04-10 NOTE — HISTORY OF PRESENT ILLNESS
[FreeTextEntry1] : 27-year-old female is here for checkup.  This is my first time seeing this patient.\par  [de-identified] : 27-year-old female is here for checkup.  This is my first time seeing this patient.\par \par Patient denies any fevers, chills, nausea, vomiting, diarrhea, constipation, chest pain, shortness of breath, weakness, numbness, tingling at this time.\par \par Patient states that she needs ID referral as well as a podiatry referral.\par She reports a history of congenital HIV.  She has been seen by ID doctor during her whole life.  Her current ID doctor is Dr. Marv Macedo who she has been seen for the past 2 years.  She just needs a referral for ID for insurance purposes.\par Also wants to see the podiatrist that she has some discoloration of the nail of the left foot.\par \par Patient reports that she did not get the flu vaccine this year.\par Patient reports that she has had 0 COVID vaccines in the past.\par

## 2023-04-10 NOTE — HEALTH RISK ASSESSMENT
[Good] : ~his/her~  mood as  good [No] : In the past 12 months have you used drugs other than those required for medical reasons? No [0] : 2) Feeling down, depressed, or hopeless: Not at all (0) [Patient reported PAP Smear was normal] : Patient reported PAP Smear was normal [None] : None [Alone] : lives alone [Employed] : employed [College] : College [Single] : single [Fully functional (bathing, dressing, toileting, transferring, walking, feeding)] : Fully functional (bathing, dressing, toileting, transferring, walking, feeding) [Fully functional (using the telephone, shopping, preparing meals, housekeeping, doing laundry, using] : Fully functional and needs no help or supervision to perform IADLs (using the telephone, shopping, preparing meals, housekeeping, doing laundry, using transportation, managing medications and managing finances) [Never] : Never [de-identified] : Patient reports she is good with diet and exercise. [de-identified] : Patient reports she is good with diet and exercise. [[id number]] : 0 [PapSmearComments] : Reports that she had a normal Pap smear in 2022.  She reports she has a gyn. 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

## 2023-04-10 NOTE — PHYSICAL EXAM
[Normal] : no respiratory distress, lungs were clear to auscultation bilaterally and no accessory muscle use [No Varicosities] : no varicosities [Pedal Pulses Present] : the pedal pulses are present [No Edema] : there was no peripheral edema [No Extremity Clubbing/Cyanosis] : no extremity clubbing/cyanosis [Soft] : abdomen soft [Non Tender] : non-tender [Non-distended] : non-distended [Normal Bowel Sounds] : normal bowel sounds [de-identified] : Left second toe with very small discoloration on the tip of the nail.

## 2023-04-10 NOTE — ASSESSMENT
[FreeTextEntry1] : We will do routine blood work in the form of CBC, CMP, A1c, lipid, TSH, B12 folate at this point.\par I ordered blood work for this patient, however the patient refused to do blood work.  She states she had blood work done recently with her ID doctor.  She says does not want to do any blood work today.  She refused to do blood work today.\par she may follow-up earlier if needed\par Pt was told to call with problems or concerns. The patient was told to seek immediate attention by calling 911 or going to the ER if her condition does not improve or gets acutely worse.\par

## 2023-12-02 NOTE — CONSULT NOTE ADULT - ASSESSMENT
The patient is a 22 yo F with HIV (Maternal transmission) who presents for evaluation of genital HSV lesions now started on foscarnet. Cough, shortness of breath x 1 month onset approximately 1 week after outpatient surgery.  Mildly improved after antibiotics but still with dyspnea on exertion and cough that sounds bronchospastic based on patient's description (associated with wheeze, intermittent).  Nontoxic and vital signs stable aside from mild hypertension.  Very low concern for ACS, CHF, hemothorax/pneumothorax.  Likely viral illness based on description of symptoms but given recent surgery will need D-dimer to evaluate for VTE.  There is no clinical evidence of DVT.  Labs including trop/dimer, ekg, CXR, CT-A chest based on XR/dimer.  Presuming nonactionable workup and clinical course, patient likely can be discharged with prednisone prescription.  --BMM

## 2024-01-30 NOTE — PROGRESS NOTE ADULT - PROBLEM SELECTOR PLAN 1
Left msg for Pt to return call. RE: Pt port removal   Prior diagnosis in the setting of HIV. Current lesions have been present for 4 months despite treatment with multiple antivirals. Causes patient pain, controlled well with Ketorolac. Consider superinfection d/t purulent exudate, which could help explain lack of response to antivirals. Biopsy obtained by Gynecology apparently returned as inflammatory changes, special stains sent out today.   - c/w Acyclovir 400 mg iv q 8 h, Cefazolin 1 gm IV q8, pt treated w/ repeated courses of foscarnet in the past, resistance testing was sent 6/4, results pending  - Consider topical Cidofovir, discuss w ID and Derm  - afebrile, BP stable, no leukocytosis  - Ketoralac PRN for pain management, well controlled  - f/u swab results for sensitivities  - f/u final biopsy (special stains requested today  - would prefer not to use cidofovir systemically d/t toxicity  - Spoke with pharmacy regarding topical cidofovir. Option exists for off-label 1% cidofovir gel. Await Derm recommendations. Prior diagnosis in the setting of HIV. Current lesions have been present for 4 months despite treatment with multiple antivirals. Causes patient pain, controlled well with Ketorolac. Consider superinfection d/t purulent exudate, which could help explain lack of response to antivirals. Biopsy obtained by Gynecology apparently returned as inflammatory changes, special stains sent out today.   - c/w Acyclovir 400 mg iv q 8 h, Cefazolin 1 gm IV q8, pt treated w/ repeated courses of foscarnet in the past, resistance testing was sent 6/4, results pending  - Consider topical Cidofovir, discuss w ID and Derm  - afebrile, BP stable, no leukocytosis  - Ketoralac PRN for pain management, well controlled  - f/u swab results for sensitivities  - f/u final biopsy (special stains requested today  - would prefer not to use cidofovir systemically d/t toxicity  - Spoke with pharmacy regarding topical cidofovir. Option exists for off-label 1% cidofovir gel. Await Derm recommendations.  - Derm recs wound care to see patient